# Patient Record
Sex: MALE | Race: BLACK OR AFRICAN AMERICAN | Employment: FULL TIME | URBAN - METROPOLITAN AREA
[De-identification: names, ages, dates, MRNs, and addresses within clinical notes are randomized per-mention and may not be internally consistent; named-entity substitution may affect disease eponyms.]

---

## 2018-02-23 ENCOUNTER — HOSPITAL ENCOUNTER (EMERGENCY)
Age: 28
Discharge: HOME | End: 2018-02-23
Attending: EMERGENCY MEDICINE
Payer: COMMERCIAL

## 2018-02-23 ENCOUNTER — APPOINTMENT (OUTPATIENT)
Dept: GENERAL RADIOLOGY | Age: 28
End: 2018-02-23
Attending: PHYSICIAN ASSISTANT
Payer: COMMERCIAL

## 2018-02-23 VITALS
OXYGEN SATURATION: 100 % | SYSTOLIC BLOOD PRESSURE: 117 MMHG | RESPIRATION RATE: 18 BRPM | DIASTOLIC BLOOD PRESSURE: 66 MMHG | HEART RATE: 65 BPM | TEMPERATURE: 97.1 F

## 2018-02-23 DIAGNOSIS — S82.54XA CLOSED NONDISPLACED FRACTURE OF MEDIAL MALLEOLUS OF RIGHT TIBIA, INITIAL ENCOUNTER: ICD-10-CM

## 2018-02-23 DIAGNOSIS — S82.454A CLOSED NONDISPLACED COMMINUTED FRACTURE OF SHAFT OF RIGHT FIBULA, INITIAL ENCOUNTER: Primary | ICD-10-CM

## 2018-02-23 PROCEDURE — 73590 X-RAY EXAM OF LOWER LEG: CPT

## 2018-02-23 PROCEDURE — 73610 X-RAY EXAM OF ANKLE: CPT

## 2018-02-23 PROCEDURE — 99283 EMERGENCY DEPT VISIT LOW MDM: CPT

## 2018-02-23 PROCEDURE — 74011250637 HC RX REV CODE- 250/637: Performed by: PHYSICIAN ASSISTANT

## 2018-02-23 PROCEDURE — 75810000053 HC SPLINT APPLICATION

## 2018-02-23 RX ORDER — HYDROCODONE BITARTRATE AND ACETAMINOPHEN 5; 325 MG/1; MG/1
1 TABLET ORAL
Qty: 20 TAB | Refills: 0 | Status: SHIPPED | OUTPATIENT
Start: 2018-02-23 | End: 2018-02-23

## 2018-02-23 RX ORDER — ACETAMINOPHEN AND CODEINE PHOSPHATE 300; 30 MG/1; MG/1
1 TABLET ORAL
Status: COMPLETED | OUTPATIENT
Start: 2018-02-23 | End: 2018-02-23

## 2018-02-23 RX ADMIN — ACETAMINOPHEN AND CODEINE PHOSPHATE 1 TABLET: 300; 30 TABLET ORAL at 15:01
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
## 2018-02-23 NOTE — ED NOTES
I have reviewed discharge instructions with the patient. The patient verbalized understanding. Splint placed to pts right leg by Andres Lilly who also provided pt with crutches with instructions.  Splint reviewed by MARBELLA Khanna prior to pt being discharged

## 2018-02-23 NOTE — DISCHARGE INSTRUCTIONS
Broken Lower Leg: Care Instructions  Your Care Instructions    Treatment for your broken leg will depend on how bad the break is. Your doctor may have put your lower leg in a splint or a cast to allow it to heal or keep it stable until you see another doctor. It may take weeks or months for your leg to heal. You can help it heal with some care at home. You heal best when you take good care of yourself. Eat a variety of healthy foods, and don't smoke. Follow-up care is a key part of your treatment and safety. Be sure to make and go to all appointments, and call your doctor if you are having problems. It's also a good idea to know your test results and keep a list of the medicines you take. How can you care for yourself at home? · Put ice or a cold pack on your lower leg for 10 to 20 minutes at a time. Try to do this every 1 to 2 hours for the next 3 days (when you are awake). Put a thin cloth between the ice and your cast or splint. Keep your cast or splint dry. · Follow the cast care instructions your doctor gives you. If you have a splint, do not take it off unless your doctor tells you to. · Be safe with medicines. Take pain medicines exactly as directed. ¨ If the doctor gave you a prescription medicine for pain, take it as prescribed. ¨ If you are not taking a prescription pain medicine, ask your doctor if you can take an over-the-counter medicine. · Do not put weight on your leg unless your doctor tells you to. Use crutches to walk. · Prop up your leg on pillows when you sit or lie down in the first few days after the injury. Keep your leg higher than the level of your heart. This will help reduce swelling. · Follow instructions for exercises to keep your leg strong. · Wiggle your toes often to reduce swelling and stiffness. When should you call for help? Call 911 anytime you think you may need emergency care.  For example, call if:  ? · You have chest pain, are short of breath, or you cough up blood.   ? · You are very sleepy and you have trouble waking up. ?Call your doctor now or seek immediate medical care if:  ? · You have new or worse nausea or vomiting. ? · You have new or worse pain. ? · Your foot is cool or pale or changes color. ? · You have tingling, weakness, or numbness in your toes. ? · Your cast or splint feels too tight. ? · You have signs of a blood clot in your leg (called a deep vein thrombosis), such as:  ¨ Pain in your calf, back of the knee, thigh, or groin. ¨ Redness or swelling in your leg. ? Watch closely for changes in your health, and be sure to contact your doctor if:  ? · You have a problem with your splint or cast.   ? · You do not get better as expected. Where can you learn more? Go to http://tony-iris.info/. Enter L198 in the search box to learn more about \"Broken Lower Leg: Care Instructions. \"  Current as of: March 21, 2017  Content Version: 11.4  © 6131-1929 [employer]. Care instructions adapted under license by [employer] (which disclaims liability or warranty for this information). If you have questions about a medical condition or this instruction, always ask your healthcare professional. [address] any warranty or liability for your use of this information. InSequent Activation    Thank you for requesting access to In[employer]. Please follow the instructions below to securely access and download your online medical record. InSequent allows you to send messages to your doctor, view your test results, renew your prescriptions, schedule appointments, and more. How Do I Sign Up? 1. In your internet browser, go to [email or web address]  2. Click on the First Time User? Click Here link in the Sign In box. You will be redirect to the New Member Sign Up page. 3. Enter your InSequent Access Code exactly as it appears below.  You will not need to use this code after youve completed the sign-up process. If you do not sign up before the expiration date, you must request a new code. Chatous Access Code: [email or web address]  Expires: 2018  3:13 PM (This is the date your Chatous access code will )    4. Enter the last four digits of your Social Security Number (xxxx) and Date of Birth (mm/dd/yyyy) as indicated and click Submit. You will be taken to the next sign-up page. 5. Create a Chatous ID. This will be your Chatous login ID and cannot be changed, so think of one that is secure and easy to remember. 6. Create a Chatous password. You can change your password at any time. 7. Enter your Password Reset Question and Answer. This can be used at a later time if you forget your password. 8. Enter your e-mail address. You will receive e-mail notification when new information is available in [address]. 9. Click Sign Up. You can now view and download portions of your medical record. 10. Click the Download Summary menu link to download a portable copy of your medical information. Additional Information    If you have questions, please visit the Frequently Asked Questions section of the Chatous website at [email or web address] Remember, Chatous is NOT to be used for urgent needs. For medical emergencies, dial 911.

## 2018-02-23 NOTE — LETTER
[address] [place] [address] [facility] EMERGENCY DEPT 
[address] [facility] [address] 
[phone] Work/School Note Date: 2/23/2018 To Whom It May concern: 
 
Willi Grimes was seen and treated today in the emergency room by the following provider(s): 
Attending Provider: Felicia Michaels MD 
Physician Assistant: Erin Doan. Willi Grimes may return to work on 2/28/18. Sincerely, 
 
 
 
 
Erin Doan

## 2018-02-24 ENCOUNTER — HOSPITAL ENCOUNTER (EMERGENCY)
Age: 28
Discharge: HOME | End: 2018-02-24
Attending: EMERGENCY MEDICINE
Payer: SELF-PAY

## 2018-02-24 VITALS
HEART RATE: 66 BPM | BODY MASS INDEX: 21.14 KG/M2 | SYSTOLIC BLOOD PRESSURE: 134 MMHG | DIASTOLIC BLOOD PRESSURE: 82 MMHG | TEMPERATURE: 98.3 F | OXYGEN SATURATION: 99 % | HEIGHT: 75 IN | RESPIRATION RATE: 16 BRPM | WEIGHT: 170 LBS

## 2018-02-24 DIAGNOSIS — M79.604 RIGHT LEG PAIN: ICD-10-CM

## 2018-02-24 DIAGNOSIS — S82.391D CLOSED FRACTURE OF POSTERIOR MALLEOLUS OF RIGHT TIBIA WITH ROUTINE HEALING, SUBSEQUENT ENCOUNTER: Primary | ICD-10-CM

## 2018-02-24 DIAGNOSIS — S82.454D CLOSED NONDISPLACED COMMINUTED FRACTURE OF SHAFT OF RIGHT FIBULA WITH ROUTINE HEALING, SUBSEQUENT ENCOUNTER: ICD-10-CM

## 2018-02-24 PROCEDURE — 74011250636 HC RX REV CODE- 250/636: Performed by: PHYSICIAN ASSISTANT

## 2018-02-24 PROCEDURE — 96372 THER/PROPH/DIAG INJ SC/IM: CPT

## 2018-02-24 PROCEDURE — 99283 EMERGENCY DEPT VISIT LOW MDM: CPT

## 2018-02-24 PROCEDURE — 74011250637 HC RX REV CODE- 250/637: Performed by: PHYSICIAN ASSISTANT

## 2018-02-24 RX ORDER — HYDROCODONE BITARTRATE AND ACETAMINOPHEN 5; 325 MG/1; MG/1
1 TABLET ORAL
COMMUNITY
End: 2018-02-28

## 2018-02-24 RX ORDER — ONDANSETRON 8 MG/1
8 TABLET, ORALLY DISINTEGRATING ORAL
Status: COMPLETED | OUTPATIENT
Start: 2018-02-24 | End: 2018-02-24

## 2018-02-24 RX ORDER — MORPHINE SULFATE 4 MG/ML
6 INJECTION INTRAVENOUS ONCE
Status: COMPLETED | OUTPATIENT
Start: 2018-02-24 | End: 2018-02-24

## 2018-02-24 RX ADMIN — ONDANSETRON 8 MG: 8 TABLET, ORALLY DISINTEGRATING ORAL at 06:44

## 2018-02-24 RX ADMIN — MORPHINE SULFATE 6 MG: 4 INJECTION INTRAVENOUS at 06:44
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
## 2018-02-24 NOTE — ED NOTES
Pt resting on stretcher with family at bedside. Pt reports 10/10 pain. Medication administered. Marleen bal to monitor pt and await discharge instructions.
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
## 2018-02-24 NOTE — ED NOTES
I have reviewed discharge instructions with patient. The patient verbalized understanding. Patient armband removed and shredded. No acute distress noted at this time, pt alert and oriented. Stable at time of discharge. Vital signs stable. [facility]. Pt instructed to fill prescription given to him at prior visit.

## 2018-02-27 ENCOUNTER — APPOINTMENT (OUTPATIENT)
Dept: GENERAL RADIOLOGY | Age: 28
End: 2018-02-27
Attending: ORTHOPAEDIC SURGERY
Payer: COMMERCIAL

## 2018-02-27 ENCOUNTER — ANESTHESIA (OUTPATIENT)
Dept: SURGERY | Age: 28
End: 2018-02-27
Payer: COMMERCIAL

## 2018-02-27 ENCOUNTER — HOSPITAL ENCOUNTER (OUTPATIENT)
Age: 28
Setting detail: OBSERVATION
Discharge: HOME | End: 2018-02-28
Attending: ORTHOPAEDIC SURGERY | Admitting: ORTHOPAEDIC SURGERY
Payer: COMMERCIAL

## 2018-02-27 ENCOUNTER — ANESTHESIA EVENT (OUTPATIENT)
Dept: SURGERY | Age: 28
End: 2018-02-27
Payer: COMMERCIAL

## 2018-02-27 ENCOUNTER — OFFICE VISIT (OUTPATIENT)
Dept: ORTHOPEDIC SURGERY | Age: 28
End: 2018-02-27

## 2018-02-27 VITALS
OXYGEN SATURATION: 98 % | TEMPERATURE: 98.4 F | HEIGHT: 75 IN | DIASTOLIC BLOOD PRESSURE: 83 MMHG | SYSTOLIC BLOOD PRESSURE: 134 MMHG | HEART RATE: 66 BPM

## 2018-02-27 DIAGNOSIS — S93.439A SYNDESMOTIC DISRUPTION OF ANKLE, INITIAL ENCOUNTER: ICD-10-CM

## 2018-02-27 DIAGNOSIS — S82.891A CLOSED FRACTURE OF RIGHT ANKLE, INITIAL ENCOUNTER: Primary | ICD-10-CM

## 2018-02-27 DIAGNOSIS — Z01.818 PRE-OPERATIVE EXAMINATION: ICD-10-CM

## 2018-02-27 DIAGNOSIS — S82.851A CLOSED TRIMALLEOLAR FRACTURE OF RIGHT ANKLE, INITIAL ENCOUNTER: ICD-10-CM

## 2018-02-27 PROBLEM — S82.861A: Status: ACTIVE | Noted: 2018-02-27

## 2018-02-27 PROBLEM — S82.899A ANKLE FRACTURE: Status: ACTIVE | Noted: 2018-02-27

## 2018-02-27 PROBLEM — S80.829A BLISTER OF LEG: Status: ACTIVE | Noted: 2018-02-27

## 2018-02-27 PROCEDURE — 73590 X-RAY EXAM OF LOWER LEG: CPT

## 2018-02-27 PROCEDURE — 74011250636 HC RX REV CODE- 250/636: Performed by: ANESTHESIOLOGY

## 2018-02-27 PROCEDURE — 77030011640 HC PAD GRND REM COVD -A: Performed by: ORTHOPAEDIC SURGERY

## 2018-02-27 PROCEDURE — 76210000016 HC OR PH I REC 1 TO 1.5 HR: Performed by: ORTHOPAEDIC SURGERY

## 2018-02-27 PROCEDURE — C1713 ANCHOR/SCREW BN/BN,TIS/BN: HCPCS | Performed by: ORTHOPAEDIC SURGERY

## 2018-02-27 PROCEDURE — 99218 HC RM OBSERVATION: CPT

## 2018-02-27 PROCEDURE — 76060000033 HC ANESTHESIA 1 TO 1.5 HR: Performed by: ORTHOPAEDIC SURGERY

## 2018-02-27 PROCEDURE — 74011250636 HC RX REV CODE- 250/636: Performed by: ORTHOPAEDIC SURGERY

## 2018-02-27 PROCEDURE — 77030018836 HC SOL IRR NACL ICUM -A: Performed by: ORTHOPAEDIC SURGERY

## 2018-02-27 PROCEDURE — 74011000250 HC RX REV CODE- 250

## 2018-02-27 PROCEDURE — 77030012510 HC MSK AIRWY LMA TELE -B: Performed by: ANESTHESIOLOGY

## 2018-02-27 PROCEDURE — 77030020782 HC GWN BAIR PAWS FLX 3M -B: Performed by: ORTHOPAEDIC SURGERY

## 2018-02-27 PROCEDURE — 77030016919 HC CLMP EXT FIX1 SYNT -F: Performed by: ORTHOPAEDIC SURGERY

## 2018-02-27 PROCEDURE — 74011250637 HC RX REV CODE- 250/637: Performed by: ORTHOPAEDIC SURGERY

## 2018-02-27 PROCEDURE — 74011000250 HC RX REV CODE- 250: Performed by: ANESTHESIOLOGY

## 2018-02-27 PROCEDURE — 74011250636 HC RX REV CODE- 250/636: Performed by: NURSE ANESTHETIST, CERTIFIED REGISTERED

## 2018-02-27 PROCEDURE — 76010000149 HC OR TIME 1 TO 1.5 HR: Performed by: ORTHOPAEDIC SURGERY

## 2018-02-27 PROCEDURE — 74011250636 HC RX REV CODE- 250/636

## 2018-02-27 DEVICE — SCREW EXT FIX L175MM DIA5MM THRD L60MM S STL SELF DRL SCHNZ: Type: IMPLANTABLE DEVICE | Site: TIBIA | Status: FUNCTIONAL

## 2018-02-27 DEVICE — IMPLANTABLE DEVICE: Type: IMPLANTABLE DEVICE | Site: TIBIA | Status: FUNCTIONAL

## 2018-02-27 DEVICE — CLAMP EXT FIX L PIN 6 POS MAG RESONANCE CONDITIONAL: Type: IMPLANTABLE DEVICE | Site: TIBIA | Status: FUNCTIONAL

## 2018-02-27 RX ORDER — HYDROCODONE BITARTRATE AND ACETAMINOPHEN 5; 325 MG/1; MG/1
1 TABLET ORAL ONCE
Status: DISCONTINUED | OUTPATIENT
Start: 2018-02-27 | End: 2018-02-27 | Stop reason: HOSPADM

## 2018-02-27 RX ORDER — DOCUSATE SODIUM 100 MG/1
100 CAPSULE, LIQUID FILLED ORAL DAILY
Status: DISCONTINUED | OUTPATIENT
Start: 2018-02-28 | End: 2018-02-28 | Stop reason: HOSPADM

## 2018-02-27 RX ORDER — PROMETHAZINE HYDROCHLORIDE 25 MG/1
25 TABLET ORAL
Qty: 30 TAB | Refills: 0 | Status: SHIPPED | OUTPATIENT
Start: 2018-02-27 | End: 2018-03-12 | Stop reason: SDUPTHER

## 2018-02-27 RX ORDER — HYDROMORPHONE HYDROCHLORIDE 1 MG/ML
0.5 INJECTION, SOLUTION INTRAMUSCULAR; INTRAVENOUS; SUBCUTANEOUS
Status: DISCONTINUED | OUTPATIENT
Start: 2018-02-27 | End: 2018-02-27 | Stop reason: HOSPADM

## 2018-02-27 RX ORDER — SODIUM CHLORIDE 0.9 % (FLUSH) 0.9 %
5-10 SYRINGE (ML) INJECTION EVERY 8 HOURS
Status: DISCONTINUED | OUTPATIENT
Start: 2018-02-27 | End: 2018-02-27 | Stop reason: HOSPADM

## 2018-02-27 RX ORDER — ONDANSETRON 2 MG/ML
INJECTION INTRAMUSCULAR; INTRAVENOUS AS NEEDED
Status: DISCONTINUED | OUTPATIENT
Start: 2018-02-27 | End: 2018-02-27 | Stop reason: HOSPADM

## 2018-02-27 RX ORDER — DIPHENHYDRAMINE HYDROCHLORIDE 50 MG/ML
25 INJECTION, SOLUTION INTRAMUSCULAR; INTRAVENOUS
Status: DISCONTINUED | OUTPATIENT
Start: 2018-02-27 | End: 2018-02-27 | Stop reason: HOSPADM

## 2018-02-27 RX ORDER — HYDROCODONE BITARTRATE AND ACETAMINOPHEN 10; 325 MG/1; MG/1
2 TABLET ORAL
Status: DISCONTINUED | OUTPATIENT
Start: 2018-02-27 | End: 2018-02-28 | Stop reason: HOSPADM

## 2018-02-27 RX ORDER — KETOROLAC TROMETHAMINE 30 MG/ML
INJECTION, SOLUTION INTRAMUSCULAR; INTRAVENOUS AS NEEDED
Status: DISCONTINUED | OUTPATIENT
Start: 2018-02-27 | End: 2018-02-27 | Stop reason: HOSPADM

## 2018-02-27 RX ORDER — FAMOTIDINE 20 MG/1
20 TABLET, FILM COATED ORAL 2 TIMES DAILY
Status: DISCONTINUED | OUTPATIENT
Start: 2018-02-27 | End: 2018-02-28 | Stop reason: HOSPADM

## 2018-02-27 RX ORDER — HYDROMORPHONE HYDROCHLORIDE 2 MG/ML
0.5 INJECTION, SOLUTION INTRAMUSCULAR; INTRAVENOUS; SUBCUTANEOUS
Status: DISCONTINUED | OUTPATIENT
Start: 2018-02-27 | End: 2018-02-27

## 2018-02-27 RX ORDER — HYDROCODONE BITARTRATE AND ACETAMINOPHEN 7.5; 325 MG/1; MG/1
TABLET ORAL
Qty: 50 TAB | Refills: 0 | Status: SHIPPED | OUTPATIENT
Start: 2018-02-27 | End: 2018-03-12 | Stop reason: SDUPTHER

## 2018-02-27 RX ORDER — LIDOCAINE HYDROCHLORIDE 20 MG/ML
INJECTION, SOLUTION EPIDURAL; INFILTRATION; INTRACAUDAL; PERINEURAL AS NEEDED
Status: DISCONTINUED | OUTPATIENT
Start: 2018-02-27 | End: 2018-02-27 | Stop reason: HOSPADM

## 2018-02-27 RX ORDER — FENTANYL CITRATE 50 UG/ML
INJECTION, SOLUTION INTRAMUSCULAR; INTRAVENOUS AS NEEDED
Status: DISCONTINUED | OUTPATIENT
Start: 2018-02-27 | End: 2018-02-27 | Stop reason: HOSPADM

## 2018-02-27 RX ORDER — FENTANYL CITRATE 50 UG/ML
50 INJECTION, SOLUTION INTRAMUSCULAR; INTRAVENOUS AS NEEDED
Status: DISCONTINUED | OUTPATIENT
Start: 2018-02-27 | End: 2018-02-27 | Stop reason: HOSPADM

## 2018-02-27 RX ORDER — DEXAMETHASONE SODIUM PHOSPHATE 4 MG/ML
INJECTION, SOLUTION INTRA-ARTICULAR; INTRALESIONAL; INTRAMUSCULAR; INTRAVENOUS; SOFT TISSUE AS NEEDED
Status: DISCONTINUED | OUTPATIENT
Start: 2018-02-27 | End: 2018-02-27 | Stop reason: HOSPADM

## 2018-02-27 RX ORDER — SODIUM CHLORIDE, SODIUM LACTATE, POTASSIUM CHLORIDE, CALCIUM CHLORIDE 600; 310; 30; 20 MG/100ML; MG/100ML; MG/100ML; MG/100ML
75 INJECTION, SOLUTION INTRAVENOUS CONTINUOUS
Status: DISCONTINUED | OUTPATIENT
Start: 2018-02-27 | End: 2018-02-27 | Stop reason: HOSPADM

## 2018-02-27 RX ORDER — CEFAZOLIN SODIUM 2 G/50ML
2 SOLUTION INTRAVENOUS ONCE
Status: COMPLETED | OUTPATIENT
Start: 2018-02-27 | End: 2018-02-27

## 2018-02-27 RX ORDER — PROPOFOL 10 MG/ML
INJECTION, EMULSION INTRAVENOUS AS NEEDED
Status: DISCONTINUED | OUTPATIENT
Start: 2018-02-27 | End: 2018-02-27 | Stop reason: HOSPADM

## 2018-02-27 RX ORDER — ASPIRIN 325 MG
325 TABLET ORAL DAILY
Qty: 30 TAB | Refills: 0 | Status: SHIPPED | OUTPATIENT
Start: 2018-02-27 | End: 2018-03-12 | Stop reason: SDUPTHER

## 2018-02-27 RX ORDER — NALOXONE HYDROCHLORIDE 0.4 MG/ML
0.4 INJECTION, SOLUTION INTRAMUSCULAR; INTRAVENOUS; SUBCUTANEOUS AS NEEDED
Status: DISCONTINUED | OUTPATIENT
Start: 2018-02-27 | End: 2018-02-28 | Stop reason: HOSPADM

## 2018-02-27 RX ORDER — SODIUM CHLORIDE 0.9 % (FLUSH) 0.9 %
5-10 SYRINGE (ML) INJECTION AS NEEDED
Status: DISCONTINUED | OUTPATIENT
Start: 2018-02-27 | End: 2018-02-28 | Stop reason: HOSPADM

## 2018-02-27 RX ORDER — SODIUM CHLORIDE 0.9 % (FLUSH) 0.9 %
5-10 SYRINGE (ML) INJECTION AS NEEDED
Status: DISCONTINUED | OUTPATIENT
Start: 2018-02-27 | End: 2018-02-27 | Stop reason: HOSPADM

## 2018-02-27 RX ORDER — POLYETHYLENE GLYCOL 3350 17 G/17G
17 POWDER, FOR SOLUTION ORAL DAILY
Qty: 10 PACKET | Refills: 1 | Status: SHIPPED | OUTPATIENT
Start: 2018-02-27 | End: 2018-03-12 | Stop reason: SDUPTHER

## 2018-02-27 RX ORDER — SODIUM CHLORIDE, SODIUM LACTATE, POTASSIUM CHLORIDE, CALCIUM CHLORIDE 600; 310; 30; 20 MG/100ML; MG/100ML; MG/100ML; MG/100ML
50 INJECTION, SOLUTION INTRAVENOUS CONTINUOUS
Status: DISCONTINUED | OUTPATIENT
Start: 2018-02-27 | End: 2018-02-27 | Stop reason: HOSPADM

## 2018-02-27 RX ORDER — MIDAZOLAM HYDROCHLORIDE 1 MG/ML
INJECTION, SOLUTION INTRAMUSCULAR; INTRAVENOUS AS NEEDED
Status: DISCONTINUED | OUTPATIENT
Start: 2018-02-27 | End: 2018-02-27 | Stop reason: HOSPADM

## 2018-02-27 RX ORDER — SODIUM CHLORIDE 0.9 % (FLUSH) 0.9 %
5-10 SYRINGE (ML) INJECTION EVERY 8 HOURS
Status: DISCONTINUED | OUTPATIENT
Start: 2018-02-27 | End: 2018-02-28 | Stop reason: HOSPADM

## 2018-02-27 RX ORDER — CEFAZOLIN SODIUM 2 G/50ML
2 SOLUTION INTRAVENOUS EVERY 8 HOURS
Status: DISCONTINUED | OUTPATIENT
Start: 2018-02-28 | End: 2018-02-28 | Stop reason: HOSPADM

## 2018-02-27 RX ADMIN — FAMOTIDINE 20 MG: 10 INJECTION, SOLUTION INTRAVENOUS at 17:59

## 2018-02-27 RX ADMIN — CEFAZOLIN SODIUM 2 G: 2 SOLUTION INTRAVENOUS at 18:19

## 2018-02-27 RX ADMIN — MIDAZOLAM HYDROCHLORIDE 2 MG: 1 INJECTION, SOLUTION INTRAMUSCULAR; INTRAVENOUS at 18:17

## 2018-02-27 RX ADMIN — FENTANYL CITRATE 50 MCG: 50 INJECTION INTRAMUSCULAR; INTRAVENOUS at 20:28

## 2018-02-27 RX ADMIN — DEXAMETHASONE SODIUM PHOSPHATE 4 MG: 4 INJECTION, SOLUTION INTRA-ARTICULAR; INTRALESIONAL; INTRAMUSCULAR; INTRAVENOUS; SOFT TISSUE at 18:33

## 2018-02-27 RX ADMIN — LIDOCAINE HYDROCHLORIDE 100 MG: 20 INJECTION, SOLUTION EPIDURAL; INFILTRATION; INTRACAUDAL; PERINEURAL at 18:24

## 2018-02-27 RX ADMIN — FAMOTIDINE 20 MG: 20 TABLET, FILM COATED ORAL at 21:21

## 2018-02-27 RX ADMIN — PROPOFOL 200 MG: 10 INJECTION, EMULSION INTRAVENOUS at 18:24

## 2018-02-27 RX ADMIN — ONDANSETRON 4 MG: 2 INJECTION INTRAMUSCULAR; INTRAVENOUS at 18:33

## 2018-02-27 RX ADMIN — KETOROLAC TROMETHAMINE 30 MG: 30 INJECTION, SOLUTION INTRAMUSCULAR; INTRAVENOUS at 19:28

## 2018-02-27 RX ADMIN — HYDROCODONE BITARTRATE AND ACETAMINOPHEN 2 TABLET: 10; 325 TABLET ORAL at 21:21

## 2018-02-27 RX ADMIN — FENTANYL CITRATE 100 MCG: 50 INJECTION, SOLUTION INTRAMUSCULAR; INTRAVENOUS at 18:49

## 2018-02-27 RX ADMIN — Medication 10 ML: at 21:21

## 2018-02-27 RX ADMIN — SODIUM CHLORIDE, SODIUM LACTATE, POTASSIUM CHLORIDE, AND CALCIUM CHLORIDE 50 ML/HR: 600; 310; 30; 20 INJECTION, SOLUTION INTRAVENOUS at 17:57

## 2018-02-27 NOTE — PATIENT INSTRUCTIONS
Dr. Shante Ramirez Pre-operative Instructions:      Patient: Heladio Rodriguez   :  1990     I understand I am to stop taking oral birth control pills, hormonal replacement therapy and all Aspirin, Aspirin containing medications, Non-Steroidal Anti-Inflammatory medications (such as Advil, Aleve, Motrin, Ibuprofen) and or Blood thinner medication such as Coumadin, Plavix, Heparin or others 5 days prior to surgery. I understand I am to STOP taking these Medications 5 days prior to surgery:  I am to get instructions from my prescribing physician. 1. __As listed above_______________________  2. _____________________________________  3. _____________________________________  4. _____________________________________    I understand that if I am taking daily medications for high blood pressure, I can take them the morning of surgery with a small sip of water. I will consult my prescribing physician or call JANE with specific questions. I also understand that:     I am to report important observations or changes that may occur prior to surgery. If I have any changes in my physical condition, such as a rash, a fever, sore throat, abscess, ulcers, nausea, vomiting, or diarrhea. I am to call the office and I am to consult my primary care physician to assess and treat the problem.  I am not to eat or drink anything after midnight the night before my surgery.  I am not to drink alcoholic beverages 24 hours prior to surgery.  I am not to do any illegal drugs prior to surgery.  I am not to smoke at least 24 hours prior to surgery.  I am able and will shower or bathe before surgery. I will use the Hibiclens solution on my surgical site only. The hibiclens directions are one packet a day starting two days before surgery.  I will remove any nail polish, make-up or jewelry prior to arriving for my surgery.       If I wear glasses, contact lenses or dentures they must be removed prior to going to the operating room.  All body piercing and artifical eye-lashes must be removed prior to surgery     I will not wear any aerosol sprays, perfumes or skin creams.  I am to make arrangements for a family member or friend to accompany me to surgery and take me home after my surgery as I will not be allowed to leave the hospital alone. A cab or bus will not be acceptable. Please make arrange for someone to stay with you for 24 hours after surgery.  Patient has expressed understanding of the diagnosis, treatment and planned surgery        Surgery: What to Expect at [address] [place] [address]  This care sheet gives you a general idea about how long it will take for you to recover from your surgery. But each person recovers at a different pace. How can you care for yourself at home? Activity  · Allow your body to heal. Don't move quickly or lift anything heavy until you are feeling better. · Rest when you feel tired. · Your doctor may give you specific instructions on when you can do your normal activities again, such as driving and going back to work. · Be active. Walking is a good choice. Diet  · You can eat your normal diet when you feel well. If your stomach is upset, try bland, low-fat foods like plain rice, broiled chicken, toast, and yogurt. · If your bowel movements are not regular right after surgery, try to avoid constipation and straining. Drink plenty of water. Your doctor may suggest fiber, a stool softener, or a mild laxative. Medicines  · Your doctor will tell you if and when you can restart your medicines. He or she will also give you instructions about taking any new medicines. · If you take blood thinners, such as warfarin (Coumadin), clopidogrel (Plavix), or aspirin, be sure to talk to your doctor. He or she will tell you if and when to start taking those medicines again. Make sure that you understand exactly what your doctor wants you to do. · Be safe with medicines. Read and follow all instructions on the label. ¨ If the doctor gave you a prescription medicine for pain, take it as prescribed. ¨ If you are not taking a prescription pain medicine, ask your doctor if you can take an over-the-counter medicine. Incision care  · You will have a dressing over the cut (incision). A dressing helps the incision heal and protects it. Your doctor will tell you how to take care of this. · If you have strips of tape on the cut the doctor made, leave the tape on for a week or until it falls off. · If you had stitches, your doctor will tell you when to come back to have them removed. · If you have skin adhesive on the cut, leave it on until it falls off. Skin adhesive is also called liquid stitches. · Change the bandage every day. · Wash the area daily with warm, soapy water, and pat it dry. Don't use hydrogen peroxide or alcohol. They can slow healing. · You may cover the area with a gauze bandage if it oozes fluid or rubs against clothing. · You may shower 24 to 48 hours after surgery. Pat the incision dry. Don't swim or take a bath for the first 2 weeks, or until your doctor tells you it is okay. Follow-up care is a key part of your treatment and safety. Be sure to make and go to all appointments, and call your doctor if you are having problems. It's also a good idea to know your test results and keep a list of the medicines you take. When should you call for help? Call 911 anytime you think you may need emergency care. For example, call if:  · You passed out (lost consciousness). · You have severe trouble breathing. · You have sudden chest pain and shortness of breath, or you cough up blood. Call your doctor now or seek immediate medical care if:  · You have pain that does not get better after you take pain medicine. · You have loose stitches, or your incision comes open. · You are bleeding through your dressing.  A small amount of blood is normal.  · You have signs of infection, such as:  ¨ Increased pain, swelling, warmth, or redness. ¨ Red streaks leading from the incision. ¨ Pus draining from the incision. ¨ A fever. · You have symptoms of a blood clot in your arm or leg (called a deep vein thrombosis). These may include:  ¨ Pain in your calf, back of the knee, thigh, or groin. ¨ Redness and swelling in the arm, leg, or groin. Watch closely for any changes in your health, and be sure to contact your doctor if:  · You do not have a bowel movement after taking a laxative. Where can you learn more? Go to http://tony-iris.info/  Enter U008 in the search box to learn more about \"Surgery: What to Expect at Home. \"  © 8454-8344 Healthwise, Incorporated. Care instructions adapted under license by [employer] (which disclaims liability or warranty for this information). This care instruction is for use with your licensed healthcare professional. If you have questions about a medical condition or this instruction, always ask your healthcare professional. [place] [address] any warranty or liability for your use of this information. Content Version: 27.7.806941; Current as of: November 20, 2015          Acute Pain After Surgery: Care Instructions  Your Care Instructions      It's common to have some pain after surgery. Pain doesn't mean that something is wrong or that the surgery didn't go well. But when the pain is severe, it's important to work with your doctor to manage it. It's also important to be aware of a few facts about pain and pain medicine. · You are the only person who knows what your pain feels like. So be sure to tell your doctor when you are in pain or when the pain changes. Then he or she will know how to adjust your medicines. · Pain is often easier to control right after it starts. So it may be better to take regular doses of pain medicine and not wait until the pain gets bad. · Medicine can help control pain. But this doesn't mean you'll have no pain. Medicine works to keep the pain at a level you can live with. With time, you will feel better. Follow-up care is a key part of your treatment and safety. Be sure to make and go to all appointments, and call your doctor if you are having problems. It's also a good idea to know your test results and keep a list of the medicines you take. How can you care for yourself at home? · Be safe with medicines. Read and follow all instructions on the label. ¨ If the doctor gave you a prescription medicine for pain, take it as prescribed. ¨ If you are not taking a prescription pain medicine, ask your doctor if you can take an over-the-counter medicine. · If you take an over-the-counter pain medicine, such as acetaminophen (Tylenol), ibuprofen (Advil, Motrin), or naproxen (Aleve), read and follow all instructions on the label. · Do not take two or more pain medicines at the same time unless the doctor told you to. · Do not drink alcohol while you are taking pain medicines. · Try to walk each day if your doctor recommends it. Start by walking a little more than you did the day before. Bit by bit, increase the amount you walk. Walking increases blood flow. It also helps prevent pneumonia and constipation. · To prevent constipation from opioid pain medicines:  ¨ Talk to your doctor about a laxative. ¨ Include fruits, vegetables, beans, and whole grains in your diet each day. These foods are high in fiber. ¨ Drink plenty of fluids, enough so that your urine is light yellow or clear like water. Drink water, fruit juice, or other drinks that do not contain caffeine or alcohol. If you have kidney, heart, or liver disease and have to limit fluids, talk with your doctor before you increase the amount of fluids you drink. ¨ Take a fiber supplement, such as Citrucel or Metamucil, every day if needed. Read and follow all instructions on the label.  If you take pain medicine for more than a few days, talk to your doctor before you take fiber. When should you call for help? Call your doctor now or seek immediate medical care if:  ? · Your pain gets worse. ? · Your pain is not controlled by medicine. ? Watch closely for changes in your health, and be sure to contact your doctor if you have any problems. Where can you learn more? Go to http://tony-iris.info/. Enter [phone] in the search box to learn more about \"Acute Pain After Surgery: Care Instructions. \"  Current as of: March 20, 2017  Content Version: 11.4  © 7402-1868 [employer]. Care instructions adapted under license by [employer] (which disclaims liability or warranty for this information). If you have questions about a medical condition or this instruction, always ask your healthcare professional. [address] any warranty or liability for your use of this information.

## 2018-02-27 NOTE — IP AVS SNAPSHOT
[address] [address] [facility] Patient: Lance Burgos MRN: [id number] :1990 About your hospitalization You were admitted on:  2018 You last received care in the:  [facility]  You were discharged on:  2018 Why you were hospitalized Your primary diagnosis was:  Not on File Your diagnoses also included:  Fracture Of Ankle, Trimalleolar, Closed, Right, Initial Encounter, Displaced [place]uve Fracture Of Right Lower Extremity, Blister Of Leg, Ankle Fracture Follow-up Information Follow up With Details Comments Contact Info None   None ([phone]) Patient stated that they have no PCP MARBELLA Sebastian On 3/6/2018 Appointment at 9:15 am  [place] [address] [address] [facility] [address] [facility] 
[phone] Your Scheduled Appointments 2018  9:15 AM EST  
POST OP with MARBELLA Sebastian  
[facility] ([facility] [address] [facility]  
[phone] Discharge Orders None A check yamilex indicates which time of day the medication should be taken. My Medications CHANGE how you take these medications Instructions Each Dose to Equal  
 Morning Noon Evening Bedtime HYDROcodone-acetaminophen 7.5-325 mg per tablet Commonly known as:  Douglas Grayson What changed:  Another medication with the same name was removed. Continue taking this medication, and follow the directions you see here. Your last dose was: Your next dose is: TAKE ONE TABLET BY MOUTH Q 4 - 6 HRS PRN PAIN **AFTER SURGERY**DO NOT TAKE BEFORE SURGERY  Indications: Pain CONTINUE taking these medications Instructions Each Dose to Equal  
 Morning Noon Evening Bedtime aspirin 325 mg tablet Commonly known as:  ASPIRIN Your last dose was: Your next dose is: Take 1 Tab by mouth daily. 325 mg  
    
   
   
   
  
 polyethylene glycol 17 gram packet Commonly known as:  Rosezena Margaret Your last dose was: Your next dose is: Take 1 Packet by mouth daily. 17 g  
    
   
   
   
  
 promethazine 25 mg tablet Commonly known as:  PHENERGAN Your last dose was: Your next dose is: Take 1 Tab by mouth every six (6) hours as needed for Nausea. 25 mg Discharge Instructions DISCHARGE SUMMARY from Nurse PATIENT INSTRUCTIONS: 
 
 
F-face looks uneven A-arms unable to move or move unevenly S-speech slurred or non-existent T-time-call 911 as soon as signs and symptoms begin-DO NOT go Back to bed or wait to see if you get better-TIME IS BRAIN. Warning Signs of HEART ATTACK Call 911 if you have these symptoms: 
? Chest discomfort. Most heart attacks involve discomfort in the center of the chest that lasts more than a few minutes, or that goes away and comes back. It can feel like uncomfortable pressure, squeezing, fullness, or pain. ? Discomfort in other areas of the upper body. Symptoms can include pain or discomfort in one or both arms, the back, neck, jaw, or stomach. ? Shortness of breath with or without chest discomfort. ? Other signs may include breaking out in a cold sweat, nausea, or lightheadedness. Don't wait more than five minutes to call [address]! Fast action can save your life.  Calling 911 is almost always the fastest way to get lifesaving treatment. Emergency Medical Services staff can begin treatment when they arrive  up to an hour sooner than if someone gets to the hospital by car. The discharge information has been reviewed with the patient. The patient verbalized understanding. Discharge medications reviewed with the patient and appropriate educational materials and side effects teaching were provided. ___________________________________________________________________________________________________________________________________ MyChart Activation Thank you for requesting access to [employer]. Please follow the instructions below to securely access and download your online medical record. [employer] allows you to send messages to your doctor, view your test results, renew your prescriptions, schedule appointments, and more. How Do I Sign Up? 1. In your internet browser, go to [email or web address] 
2. Click on the First Time User? Click Here link in the Sign In box. You will be redirect to the New Member Sign Up page. 3. Enter your [employer] Access Code exactly as it appears below. You will not need to use this code after youve completed the sign-up process. If you do not sign up before the expiration date, you must request a new code. [employer] Access Code: [email or web address] Expires: 2018  3:13 PM (This is the date your [employer] access code will ) 4. Enter the last four digits of your Social Security Number (xxxx) and Date of Birth (mm/dd/yyyy) as indicated and click Submit. You will be taken to the next sign-up page. 5. Create a [employer] ID. This will be your [employer] login ID and cannot be changed, so think of one that is secure and easy to remember. 6. Create a [employer] password. You can change your password at any time. 7. Enter your Password Reset Question and Answer. This can be used at a later time if you forget your password. 8. Enter your e-mail address. You will receive e-mail notification when new information is available in [address] 9. Click Sign Up. You can now view and download portions of your medical record. 10. Click the Download Summary menu link to download a portable copy of your medical information. Additional Information If you have questions, please visit the Frequently Asked Questions section of the [employer] website at [email or web address] Remember, [employer]BE is NOT to be used for urgent needs. For medical emergencies, dial 911. Patient armband removed and shredded HE[employer]BE Announcement We are excited to announce that we are making your provider's discharge notes available to you in [employer]BE. You will see these notes when they are completed and signed by the physician that discharged you from your recent hospital stay. If you have any questions or concerns about any information you see in [employer], please call the Health Information Department where you were seen or reach out to your Primary Care Provider for more information about your plan of care. Introducing [facility]! Jelani Sauer introduces [employer]BE patient portal. Now you can access parts of your medical record, email your doctor's office, and request medication refills online. 1. In your internet browser, go to [email or web address] 2. Click on the First Time User? Click Here link in the Sign In box. You will see the New Member Sign Up page. 3. Enter your HEALBE Access Code exactly as it appears below. You will not need to use this code after youve completed the sign-up process. If you do not sign up before the expiration date, you must request a new code. · [employer]ALBE Access Code: [email or web address] Expires: 5/24/2018  3:13 PM 
 
4. Enter the last four digits of your Social Security Number (xxxx) and Date of Birth (mm/dd/yyyy) as indicated and click Submit.  You will be taken to the next sign-up page. 5. Create a My Single Point ID. This will be your My [employer] login ID and cannot be changed, so think of one that is secure and easy to remember. 6. Create a [employer] password. You can change your password at any time. 7. Enter your Password Reset Question and Answer. This can be used at a later time if you forget your password. 8. Enter your e-mail address. You will receive e-mail notification when new information is available in [address] 9. Click Sign Up. You can now view and download portions of your medical record. 10. Click the Download Summary menu link to download a portable copy of your medical information. If you have questions, please visit the Frequently Asked Questions section of the [employer] website. Remember, My [employer] is NOT to be used for urgent needs. For medical emergencies, dial 911. Now available from your iPhone and Android! Unresulted Labs-Please follow up with your PCP about these lab tests Order Current Status [place] XR TECHNOLOGIST SERVICE In process Providers Seen During Your Hospitalization Provider Specialty Primary office phone Shawna Omega, [address] [facility] Orthopedic Surgery [phone] Your Primary Care Physician (PCP) Primary Care Physician Office Phone Office Fax NONE ** None ** ** None ** You are allergic to the following No active allergies Recent Documentation Height Weight BMI Smoking Status 1.905 m 79.4 kg 21.87 kg/m2 Never Smoker Emergency Contacts Name Discharge Info Relation Home Work Mobile [address] DISCHARGE CAREGIVER [3] Other Relative [6] [phone] Rolf Baldwin DISCHARGE CAREGIVER [3] Other Relative [6] [phone] Pattie Ferris  Other Relative [6] [phone] Magdalene Soriano  Other Relative [6] [phone] Patient Belongings The following personal items are in your possession at time of discharge: Dental Appliances: None         Home Medications: None   Jewelry: None  Clothing: Footwear, Shorts, Shirt, Socks, Undergarments    Other Valuables: Crutches, [employer], Dieudonne Loose Please provide this summary of care documentation to your next provider. Signatures-by signing, you are acknowledging that this After Visit Summary has been reviewed with you and you have received a copy. Patient Signature:  ____________________________________________________________ Date:  ____________________________________________________________  
  
[facility] Provider Signature:  ____________________________________________________________ Date:  ____________________________________________________________
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
## 2018-02-27 NOTE — ANESTHESIA PREPROCEDURE EVALUATION
Anesthetic History   No history of anesthetic complications            Review of Systems / Medical History  Patient summary reviewed and pertinent labs reviewed    Pulmonary  Within defined limits                 Neuro/Psych   Within defined limits           Cardiovascular    Hypertension: poorly controlled              Exercise tolerance: >4 METS     GI/Hepatic/Renal  Within defined limits              Endo/Other  Within defined limits           Other Findings   Comments: Documentation of current medication  Current medications obtained, documented and obtained? YES      Risk Factors for Postoperative nausea/vomiting:       History of postoperative nausea/vomiting? NO       Female? NO       Motion sickness? NO       Intended opioid administration for postoperative analgesia? YES      Smoking Abstinence:  Current Smoker? NO  Elective Surgery? YES  Seen preoperatively by anesthesiologist or proxy prior to day of surgery? YES  Pt abstained from smoking 24 hours prior to anesthesia?  YES    Preventive care/screening for High Blood Pressure:  Aged 18 years and older: YES  Screened for high blood pressure: YES  Patients with high blood pressure referred to primary care provider   for BP management: YES                 Physical Exam    Airway  Mallampati: I  TM Distance: > 6 cm  Neck ROM: normal range of motion   Mouth opening: Normal     Cardiovascular  Regular rate and rhythm,  S1 and S2 normal,  no murmur, click, rub, or gallop             Dental  No notable dental hx       Pulmonary  Breath sounds clear to auscultation               Abdominal  GI exam deferred       Other Findings            Anesthetic Plan    ASA: 2  Anesthesia type: general          Induction: Intravenous  Anesthetic plan and risks discussed with: Patient

## 2018-02-27 NOTE — MR AVS SNAPSHOT
[address] [place] [address], [address] [facility] 
[phone] Patient: Tina Berry MRN: [id number] :1990 Visit Information Date & Time Provider Department Dept. Phone Encounter #  
 2018  1:30 PM Teresa Gallardo, 27 [facility] [id number] 70 Upcoming Health Maintenance Date Due DTaP/Tdap/Td series (1 - Tdap) 10/10/2011 Influenza Age 5 to Adult 2017 Allergies as of 2018  Review Complete On: 2018 By: Teresa Gallardo MD  
 No Known Allergies Current Immunizations  Never Reviewed No immunizations on file. Not reviewed this visit You Were Diagnosed With   
  
 Codes Comments Closed fracture of right ankle, initial encounter    -  Primary ICD-10-CM: B03.594J ICD-9-CM: 824.8 Closed trimalleolar fracture of right ankle, initial encounter     ICD-10-CM: A94.596L ICD-9-CM: 824.6 Syndesmotic disruption of ankle, initial encounter     ICD-10-CM: J26.573X ICD-9-CM: 845.03 Vitals BP Pulse Temp Height(growth percentile) SpO2 Smoking Status 134/83 66 98.4 °F (36.9 °C) (Oral) 6' 3\" (1.905 m) 98% Never Smoker Your Updated Medication List  
  
   
This list is accurate as of 18  4:20 PM.  Always use your most recent med list.  
  
  
  
  
 Briana Punt 5-325 mg per tablet Generic drug:  HYDROcodone-acetaminophen Take 1 Tab by mouth every four (4) hours as needed for Pain. Indications: Pain We Performed the Following AMB POC XRAY, ANKLE; COMPLETE, 3+ VIE [27532 CPT(R)] AMB POC XRAY; TIBIA & FIBULA, TWO VIE [45975 CPT(R)] To-Do List   
 2018 Lab:  CBC WITH AUTOMATED DIFF   
  
 2018 Lab:  DRUG SCREEN UR - W/ CONFIRM   
  
 2018 ECG:  EKG, 12 LEAD, SUBSEQUENT   
  
 2018 Lab:  HCG URINE, QL   
  
 2018 Lab: METABOLIC PANEL, COMPREHENSIVE Around 2018 Lab:  PROTHROMBIN TIME + INR Around 2018 Imaging:  XR CHEST PA LAT Patient Instructions Dr. Migdalia Tapia Pre-operative Instructions: 
 
 
Patient: Lance Burgos :  1990 I understand I am to stop taking oral birth control pills, hormonal replacement therapy and all Aspirin, Aspirin containing medications, Non-Steroidal Anti-Inflammatory medications (such as Advil, Aleve, Motrin, Ibuprofen) and or Blood thinner medication such as Coumadin, Plavix, Heparin or others 5 days prior to surgery. I understand I am to STOP taking these Medications 5 days prior to surgery: 
I am to get instructions from my prescribing physician. 1. __As listed above_______________________ 2. _____________________________________ 3. _____________________________________ 4. _____________________________________ I understand that if I am taking daily medications for high blood pressure, I can take them the morning of surgery with a small sip of water. I will consult my prescribing physician or call JANE with specific questions. I also understand that: ? I am to report important observations or changes that may occur prior to surgery. If I have any changes in my physical condition, such as a rash, a fever, sore throat, abscess, ulcers, nausea, vomiting, or diarrhea. I am to call the office and I am to consult my primary care physician to assess and treat the problem. ? I am not to eat or drink anything after midnight the night before my surgery. ? I am not to drink alcoholic beverages 24 hours prior to surgery. ? I am not to do any illegal drugs prior to surgery. ? I am not to smoke at least 24 hours prior to surgery. ? I am able and will shower or bathe before surgery. I will use the Hibiclens solution on my surgical site only. The hibiclens directions are one packet a day starting two days before surgery. ? I will remove any nail polish, make-up or jewelry prior to arriving for my surgery. ? If I wear glasses, contact lenses or dentures they must be removed prior to going to the operating room. ? All body piercing and artifical eye-lashes must be removed prior to surgery ? I will not wear any aerosol sprays, perfumes or skin creams. ? I am to make arrangements for a family member or friend to accompany me to surgery and take me home after my surgery as I will not be allowed to leave the hospital alone. A cab or bus will not be acceptable. Please make arrange for someone to stay with you for 24 hours after surgery. ? Patient has expressed understanding of the diagnosis, treatment and planned surgery Surgery: What to Expect at [facility] Your Recovery This care sheet gives you a general idea about how long it will take for you to recover from your surgery. But each person recovers at a different pace. How can you care for yourself at home? Activity · Allow your body to heal. Don't move quickly or lift anything heavy until you are feeling better. · Rest when you feel tired. · Your doctor may give you specific instructions on when you can do your normal activities again, such as driving and going back to work. · Be active. Walking is a good choice. Diet · You can eat your normal diet when you feel well. If your stomach is upset, try bland, low-fat foods like plain rice, broiled chicken, toast, and yogurt. · If your bowel movements are not regular right after surgery, try to avoid constipation and straining. Drink plenty of water. Your doctor may suggest fiber, a stool softener, or a mild laxative. Medicines · Your doctor will tell you if and when you can restart your medicines. He or she will also give you instructions about taking any new medicines. · If you take blood thinners, such as warfarin (Coumadin), clopidogrel (Plavix), or aspirin, be sure to talk to your doctor. He or she will tell you if and when to start taking those medicines again. Make sure that you understand exactly what your doctor wants you to do. · Be safe with medicines. Read and follow all instructions on the label. ¨ If the doctor gave you a prescription medicine for pain, take it as prescribed. ¨ If you are not taking a prescription pain medicine, ask your doctor if you can take an over-the-counter medicine. Incision care · You will have a dressing over the cut (incision). A dressing helps the incision heal and protects it. Your doctor will tell you how to take care of this. · If you have strips of tape on the cut the doctor made, leave the tape on for a week or until it falls off. · If you had stitches, your doctor will tell you when to come back to have them removed. · If you have skin adhesive on the cut, leave it on until it falls off. Skin adhesive is also called liquid stitches. · Change the bandage every day. · Wash the area daily with warm, soapy water, and pat it dry. Don't use hydrogen peroxide or alcohol. They can slow healing. · You may cover the area with a gauze bandage if it oozes fluid or rubs against clothing. · You may shower 24 to 48 hours after surgery. Pat the incision dry. Don't swim or take a bath for the first 2 weeks, or until your doctor tells you it is okay. Follow-up care is a key part of your treatment and safety. Be sure to make and go to all appointments, and call your doctor if you are having problems. It's also a good idea to know your test results and keep a list of the medicines you take. When should you call for help? Call 911 anytime you think you may need emergency care. For example, call if: 
· You passed out (lost consciousness). · You have severe trouble breathing. · You have sudden chest pain and shortness of breath, or you cough up blood. Call your doctor now or seek immediate medical care if: 
· You have pain that does not get better after you take pain medicine. · You have loose stitches, or your incision comes open. · You are bleeding through your dressing. A small amount of blood is normal. 
· You have signs of infection, such as: 
¨ Increased pain, swelling, warmth, or redness. ¨ Red streaks leading from the incision. ¨ Pus draining from the incision. ¨ A fever. · You have symptoms of a blood clot in your arm or leg (called a deep vein thrombosis). These may include: 
¨ Pain in your calf, back of the knee, thigh, or groin. ¨ Redness and swelling in the arm, leg, or groin. Watch closely for any changes in your health, and be sure to contact your doctor if: 
· You do not have a bowel movement after taking a laxative. Where can you learn more? Go to http://tony-iris.info/ Enter E862 in the search box to learn more about \"Surgery: What to Expect at Home. \" 
© 8734-1047 Healthwise, [employer]. Care instructions adapted under license by [employer] (which disclaims liability or warranty for this information). This care instruction is for use with your licensed healthcare professional. If you have questions about a medical condition or this instruction, always ask your healthcare professional. [address] any warranty or liability for your use of this information. Content Version: 67.2.244280; Current as of: November 20, 2015 Acute Pain After Surgery: Care Instructions Your Care Instructions It's common to have some pain after surgery. Pain doesn't mean that something is wrong or that the surgery didn't go well. But when the pain is severe, it's important to work with your doctor to manage it. It's also important to be aware of a few facts about pain and pain medicine. · You are the only person who knows what your pain feels like. So be sure to tell your doctor when you are in pain or when the pain changes. Then he or she will know how to adjust your medicines. · Pain is often easier to control right after it starts. So it may be better to take regular doses of pain medicine and not wait until the pain gets bad. · Medicine can help control pain. But this doesn't mean you'll have no pain. Medicine works to keep the pain at a level you can live with. With time, you will feel better. Follow-up care is a key part of your treatment and safety. Be sure to make and go to all appointments, and call your doctor if you are having problems. It's also a good idea to know your test results and keep a list of the medicines you take. How can you care for yourself at home? · Be safe with medicines. Read and follow all instructions on the label. ¨ If the doctor gave you a prescription medicine for pain, take it as prescribed. ¨ If you are not taking a prescription pain medicine, ask your doctor if you can take an over-the-counter medicine. · If you take an over-the-counter pain medicine, such as acetaminophen (Tylenol), ibuprofen (Advil, Motrin), or naproxen (Aleve), read and follow all instructions on the label. · Do not take two or more pain medicines at the same time unless the doctor told you to. · Do not drink alcohol while you are taking pain medicines. · Try to walk each day if your doctor recommends it. Start by walking a little more than you did the day before. Bit by bit, increase the amount you walk. Walking increases blood flow. It also helps prevent pneumonia and constipation. · To prevent constipation from opioid pain medicines: ¨ Talk to your doctor about a laxative. ¨ Include fruits, vegetables, beans, and whole grains in your diet each day. These foods are high in fiber.  
¨ Drink plenty of fluids, enough so that your urine is light yellow or clear like water. Drink water, fruit juice, or other drinks that do not contain caffeine or alcohol. If you have kidney, heart, or liver disease and have to limit fluids, talk with your doctor before you increase the amount of fluids you drink. ¨ Take a fiber supplement, such as Citrucel or Metamucil, every day if needed. Read and follow all instructions on the label. If you take pain medicine for more than a few days, talk to your doctor before you take fiber. When should you call for help? Call your doctor now or seek immediate medical care if: 
? · Your pain gets worse. ? · Your pain is not controlled by medicine. ? Watch closely for changes in your health, and be sure to contact your doctor if you have any problems. Where can you learn more? Go to http://tony-iris.info/. Enter ([phone] 445-536 in the search box to learn more about \"Acute Pain After Surgery: Care Instructions. \" Current as of: March 20, 2017 Content Version: 11.4 © 2954-7578 [employer]. Care instructions adapted under license by [employer] (which disclaims liability or warranty for this information). If you have questions about a medical condition or this instruction, always ask your healthcare professional. [place] [address] any warranty or liability for your use of this information. Introducing [facility]! [facility] introduces [employer] patient portal. Now you can access parts of your medical record, email your doctor's office, and request medication refills online. 1. In your internet browser, go to [email or web address] 2. Click on the First Time User? Click Here link in the Sign In box. You will see the New Member Sign Up page. 3. Enter your [employer] Access Code exactly as it appears below. You will not need to use this code after youve completed the sign-up process.  If you do not sign up before the expiration date, you must request a new code. · [employer] Access Code: [email or web address] Expires: 5/24/2018  3:13 PM 
 
4. Enter the last four digits of your Social Security Number (xxxx) and Date of Birth (mm/dd/yyyy) as indicated and click Submit. You will be taken to the next sign-up page. 5. Create a [employer] ID. This will be your [employer] login ID and cannot be changed, so think of one that is secure and easy to remember. 6. Create a [employer] password. You can change your password at any time. 7. Enter your Password Reset Question and Answer. This can be used at a later time if you forget your password. 8. Enter your e-mail address. You will receive e-mail notification when new information is available in [address]. 9. Click Sign Up. You can now view and download portions of your medical record. 10. Click the Download Summary menu link to download a portable copy of your medical information. If you have questions, please visit the Frequently Asked Questions section of the [employer] website. Remember, [employer] is NOT to be used for urgent needs. For medical emergencies, dial 911. Now available from your iPhone and Android! Please provide this summary of care documentation to your next provider. Your primary care clinician is listed as NONE. If you have any questions after today's visit, please call [phone].

## 2018-02-27 NOTE — LETTER
NOTIFICATION RETURN TO WORK / SCHOOL 
 
2/27/2018 4:18 PM 
 
Mr. Brandy Branch [address] [place] [address] To Whom It May Concern: 
 
rBandy Branch is currently under the care of [address] [place], [place] [address]lydia Sinclaird. Please allow Mr. Marga Sandoval to remain out of work for 4 weeks. He will be reevaluated in two weeks. If there are questions or concerns please have the patient contact our office.  
 
 
 
Sincerely, 
 
 
Tiffany Patel MD
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
## 2018-02-28 ENCOUNTER — APPOINTMENT (OUTPATIENT)
Dept: CT IMAGING | Age: 28
End: 2018-02-28
Attending: ORTHOPAEDIC SURGERY
Payer: COMMERCIAL

## 2018-02-28 ENCOUNTER — HOME HEALTH ADMISSION (OUTPATIENT)
Dept: HOME HEALTH SERVICES | Facility: HOME HEALTH | Age: 28
End: 2018-02-28

## 2018-02-28 VITALS
OXYGEN SATURATION: 100 % | DIASTOLIC BLOOD PRESSURE: 80 MMHG | TEMPERATURE: 98.8 F | SYSTOLIC BLOOD PRESSURE: 138 MMHG | HEIGHT: 75 IN | BODY MASS INDEX: 21.76 KG/M2 | HEART RATE: 54 BPM | RESPIRATION RATE: 18 BRPM | WEIGHT: 175 LBS

## 2018-02-28 PROCEDURE — 73700 CT LOWER EXTREMITY W/O DYE: CPT

## 2018-02-28 PROCEDURE — 74011250636 HC RX REV CODE- 250/636: Performed by: ORTHOPAEDIC SURGERY

## 2018-02-28 PROCEDURE — 74011250637 HC RX REV CODE- 250/637: Performed by: ORTHOPAEDIC SURGERY

## 2018-02-28 PROCEDURE — 97161 PT EVAL LOW COMPLEX 20 MIN: CPT

## 2018-02-28 PROCEDURE — 99218 HC RM OBSERVATION: CPT

## 2018-02-28 PROCEDURE — 77030027138 HC INCENT SPIROMETER -A

## 2018-02-28 RX ADMIN — Medication 10 ML: at 14:00

## 2018-02-28 RX ADMIN — CEFAZOLIN SODIUM 2 G: 2 SOLUTION INTRAVENOUS at 10:09

## 2018-02-28 RX ADMIN — HYDROCODONE BITARTRATE AND ACETAMINOPHEN 2 TABLET: 10; 325 TABLET ORAL at 15:07

## 2018-02-28 RX ADMIN — CEFAZOLIN SODIUM 2 G: 2 SOLUTION INTRAVENOUS at 02:07

## 2018-02-28 RX ADMIN — DOCUSATE SODIUM 100 MG: 100 CAPSULE, LIQUID FILLED ORAL at 08:41

## 2018-02-28 RX ADMIN — Medication 10 ML: at 04:30

## 2018-02-28 RX ADMIN — FAMOTIDINE 20 MG: 20 TABLET, FILM COATED ORAL at 08:41

## 2018-02-28 RX ADMIN — HYDROCODONE BITARTRATE AND ACETAMINOPHEN 2 TABLET: 10; 325 TABLET ORAL at 02:09

## 2018-02-28 NOTE — PROGRESS NOTES
Care Management Interventions  PCP Verified by CM: No  Palliative Care Criteria Met (RRAT>21 & CHF Dx)?: No  Mode of Transport at Discharge: Other (see comment) (Mother POV)  Transition of Care Consult (CM Consult): [address]: Yes  Physical Therapy Consult: Yes  Current Support Network: Lives Alone, Own Home (Parents, friends, grandmother, and Finance)  Confirm Follow Up Transport: Family  Plan discussed with Pt/Family/Caregiver: Yes  Freedom of Choice Offered: Yes  Discharge Location  Discharge Placement: Home with home health     33 yo male with external fixation to right distal tib/fib and ankle. He will need HHC at home and understands that it is his choice, he did choose [facility]. He will be staying at his grandmothers, [facility] has that information. He asked about family leave paperwork and I told him his doctors office needed to fill it out and offered to fax it to them. He said it wasn't here and he would have his grandmother take care it.

## 2018-02-28 NOTE — DISCHARGE INSTRUCTIONS
DISCHARGE SUMMARY from Nurse    PATIENT INSTRUCTIONS:    After general anesthesia or intravenous sedation, for 24 hours or while taking prescription Narcotics:  · Limit your activities  · Do not drive and operate hazardous machinery  · Do not make important personal or business decisions  · Do  not drink alcoholic beverages  · If you have not urinated within 8 hours after discharge, please contact your surgeon on call. Report the following to your surgeon:  · Excessive pain, swelling, redness or odor of or around the surgical area  · Temperature over 100.5  · Nausea and vomiting lasting longer than 4 hours or if unable to take medications  · Any signs of decreased circulation or nerve impairment to extremity: change in color, persistent  numbness, tingling, coldness or increase pain  · Any questions    What to do at Home:  Recommended activity: Activity as tolerated. If you experience any of the following symptoms pain not relieved by pain medication, swelling, redness, drainage please follow up with Dr. Coni Watson. *  Please give a list of your current medications to your Primary Care Provider. *  Please update this list whenever your medications are discontinued, doses are      changed, or new medications (including over-the-counter products) are added. *  Please carry medication information at all times in case of emergency situations. These are general instructions for a healthy lifestyle:    No smoking/ No tobacco products/ Avoid exposure to second hand smoke  Surgeon General's Warning:  Quitting smoking now greatly reduces serious risk to your health.     Obesity, smoking, and sedentary lifestyle greatly increases your risk for illness    A healthy diet, regular physical exercise & weight monitoring are important for maintaining a healthy lifestyle    You may be retaining fluid if you have a history of heart failure or if you experience any of the following symptoms:  Weight gain of 3 pounds or more overnight or 5 pounds in a week, increased swelling in our hands or feet or shortness of breath while lying flat in bed. Please call your doctor as soon as you notice any of these symptoms; do not wait until your next office visit. Recognize signs and symptoms of STROKE:    F-face looks uneven    A-arms unable to move or move unevenly    S-speech slurred or non-existent    T-time-call 911 as soon as signs and symptoms begin-DO NOT go       Back to bed or wait to see if you get better-TIME IS BRAIN. Warning Signs of HEART ATTACK     Call 911 if you have these symptoms:   Chest discomfort. Most heart attacks involve discomfort in the center of the chest that lasts more than a few minutes, or that goes away and comes back. It can feel like uncomfortable pressure, squeezing, fullness, or pain.  Discomfort in other areas of the upper body. Symptoms can include pain or discomfort in one or both arms, the back, neck, jaw, or stomach.  Shortness of breath with or without chest discomfort.  Other signs may include breaking out in a cold sweat, nausea, or lightheadedness. Don't wait more than five minutes to call 911 - MINUTES MATTER! Fast action can save your life. Calling 911 is almost always the fastest way to get lifesaving treatment. Emergency Medical Services staff can begin treatment when they arrive -- up to an hour sooner than if someone gets to the hospital by car. The discharge information has been reviewed with the patient. The patient verbalized understanding. Discharge medications reviewed with the patient and appropriate educational materials and side effects teaching were provided. ___________________________________________________________________________________________________________________________________  MyChart Activation    Thank you for requesting access to [employer]. Please follow the instructions below to securely access and download your online medical record.  [employer] allows you to send messages to your doctor, view your test results, renew your prescriptions, schedule appointments, and more. How Do I Sign Up? 1. In your internet browser, go to [email or web address]  2. Click on the First Time User? Click Here link in the Sign In box. You will be redirect to the New Member Sign Up page. 3. Enter your "[employer]" Access Code exactly as it appears below. You will not need to use this code after youve completed the sign-up process. If you do not sign up before the expiration date, you must request a new code. "[employer] Access Code: [email or web address]  Expires: 2018  3:13 PM (This is the date your "[employer] access code will )    4. Enter the last four digits of your Social Security Number (xxxx) and Date of Birth (mm/dd/yyyy) as indicated and click Submit. You will be taken to the next sign-up page. 5. Create a "[employer]" ID. This will be your "[employer]" login ID and cannot be changed, so think of one that is secure and easy to remember. 6. Create a "[employer]" password. You can change your password at any time. 7. Enter your Password Reset Question and Answer. This can be used at a later time if you forget your password. 8. Enter your e-mail address. You will receive e-mail notification when new information is available in [address] 9. Click Sign Up. You can now view and download portions of your medical record. 10. Click the Download Summary menu link to download a portable copy of your medical information. Additional Information    If you have questions, please visit the Frequently Asked Questions section of the "[employer]" website at [email or web address] Remember, "[employer]" is NOT to be used for urgent needs. For medical emergencies, dial 911.     Patient armband removed and shredded

## 2018-02-28 NOTE — PROGRESS NOTES
made IV; Patient was engaged in conversation with family member; Geneva Escobar introduced himself and spoke to both patient and family member briefly and departed room.

## 2018-02-28 NOTE — PROGRESS NOTES
PT order received and chart was reviewed. Pt is currently off the floor. Will re-attempt evaluation later in the day.   Hazel Reynoso, PT

## 2018-02-28 NOTE — PROGRESS NOTES
TRANSFER - IN REPORT:    Verbal report received from Friday TATIANNA goodrich(name) on [facility]  being received from [employer]) for routine post - op      Report consisted of patients Situation, Background, Assessment and   Recommendations(SBAR). Information from the following report(s) SBAR, Kardex, OR Summary, Intake/Output and MAR was reviewed with the receiving nurse. Opportunity for questions and clarification was provided. Assessment completed upon patients arrival to unit and care assumed.

## 2018-02-28 NOTE — HOME CARE
Received HH referral, Discharge noted for today, [facility] will follow for SN for External Fixation Care /Pin Site Care , pt states he has crutches, pt states he will be staying with his aunt Lindsey Ralph) when he discharges ,address obtained ( [phone]) , spoke to pt's grandmother Marcie Richardson) who states that she is willing to be taught wound care, [facility] will follow. JOANA CEDENO.

## 2018-02-28 NOTE — ROUTINE PROCESS
TRANSFER - OUT REPORT:    Verbal report given to Dimitrios Leal on Guillaume Chema  being transferred to room 506 for routine progression of care       Report consisted of patients Situation, Background, Assessment and   Recommendations(SBAR). Information from the following report(s) SBAR, OR Summary, Intake/Output and MAR was reviewed with the receiving nurse. Opportunity for questions and clarification was provided.       Patient transported with:   O2 @ 3 liters  Registered Nurse  [facility]

## 2018-02-28 NOTE — PROGRESS NOTES
2130  Received pt in stable condition,   General: lying in bed in supine, not apparent distress  Neuro: AOx4, denies numbness, 0 tingling, able to wiggle toes to bilateral extremities  Cardio: pedal pulses palpable  Respiratory: denies shortness of breath  Skin: external fixator to R leg clean, dry and intact  GI: has not voided yet  : denies nausea and vomiting  Mus: NWB to RLE  Bed in low position, oriented to room and use of call bell.

## 2018-02-28 NOTE — BRIEF OP NOTE
BRIEF OPERATIVE NOTE    Date of Procedure: 2/27/2018   Preoperative Diagnosis: RIGHT POSTERIOR TIBIA FX, RIGHT COMMINUTED FIBULA FX, SYNDESMOTIC DISRUPTION        Postoperative Diagnosis: RIGHT POSTERIOR TIBIA FX, RIGHT COMMINUTED FIBULA FX, SYNDESMOTIC DISRUPTION  Procedure(s):  RIGHT TIBIA EXTERNAL FIXATOR/C-ARM WITH DRAPE  CLOSED REDUCTION RIGHT ANKLE FX  DRAINAGE/BETY LARGE MEDIAL FX BLISTER  Surgeon(s) and Role:     * Carmina Mcdonald MD - Primary         Assistant Staff: None      Surgical Staff:  Circ-1: Jf Vo RN  Circ-Relief: Maryan Oconnell RN  Radiology Technician: Alma Alston  Scrub Tech-1: Ector Das   Scrub Tech-Relief: Arti Caulk  Surg Asst-1: Safia Fisher SA   Surg Asst-Relief: Jeanine Moniquew  No case tracking events are documented in the log. Anesthesia: General   Estimated Blood Loss: 15 ml EBL  IV FLUIDS:  700 ml IVF  Tourniquet Time:  None used  Specimens: * No specimens in log *   Findings: RIGHT POSTERIOR TIBIA FX, RIGHT COMMINUTED FIBULA FX, SYNDESMOTIC DISRUPTION   Complications: none  Implants:   Implant Name Type Inv.  Item Serial No.  Lot No. LRB No. Used Action   SCR SLF DRL 5X60 THRD 175MM --  - [id number]  SCR SLF DRL 5X60 THRD 175MM --   SYNTHES [place] N/A Right 2 Implanted   PIN STNMN CNTL THRD [id number] --  - [id number]  PIN STNMN CNTL THRD [id number] --   SYNTHES [place] N/A Right 1 Implanted   CLAMP PIN LG 6 POS MR SAFE --  - [id number]  CLAMP PIN LG 6 POS MR SAFE --   SYNTHES [place] N/A Right 1 Implanted   straight outrigger post 11mm MR safe     N/A Right 2 Implanted   SCR SLF DRL 5X60 THRD 175MM --  - [id number]  SCR SLF DRL 5X60 THRD 175MM --   SYNTHES [place] NA Right 2 Implanted   PIN STNMN CNTL THRD [id number] --  - [id number]   PIN STNMN CNTL THRD [id number] --    SYNTHES [place] NA Right 1 Implanted

## 2018-02-28 NOTE — OP NOTES
Patient: Daniel Mi                MRN:@           N: [id number]   YOB: 1990         AGE: 32 y.o. SEX: male       Date of Procedure: 2/27/2018   Preoperative Diagnosis: RIGHT POSTERIOR TIBIA FX, RIGHT COMMINUTED FIBULA FX, SYNDESMOTIC DISRUPTION        Postoperative Diagnosis: RIGHT POSTERIOR TIBIA FX, RIGHT COMMINUTED FIBULA FX, SYNDESMOTIC DISRUPTION  Procedure(s):  RIGHT TIBIA EXTERNAL FIXATOR/C-ARM WITH DRAPE  CLOSED REDUCTION RIGHT ANKLE FX  DRAINAGE/BETY LARGE MEDIAL FX BLISTER  Surgeon(s) and Role:     * Shonna Reyes MD - Primary         Assistant Staff: None      Surgical Staff:  Circ-1: Bentley Walker RN  Circ-Relief: Ponce Bates RN  Radiology Technician: Kermit Bartlett  Scrub Tech-1: Kathryn Boudreaux   Scrub Tech-Relief: Rheta Sauger  Surg Asst-1: Tanika Naveen SA   Surg Asst-Relief: Mayte Vigil  No case tracking events are documented in the log. Anesthesia: General   Estimated Blood Loss: 15 ml EBL  IV FLUIDS:  700 ml IVF  Tourniquet Time:  None used  Specimens: * No specimens in log *   Findings: RIGHT POSTERIOR TIBIA FX, RIGHT COMMINUTED FIBULA FX, SYNDESMOTIC DISRUPTION   Complications: none  Implants:   Implant Name Type Inv.  Item Serial No.  Lot No. LRB No. Used Action   SCR SLF DRL 5X60 THRD 175MM --  - [id number]  SCR SLF DRL 5X60 THRD 175MM --   SYNTHES [place] N/A Right 2 Implanted   PIN STNMN CNTL THRD [id number] --  - [id number]  PIN STNMN CNTL THRD [id number] --   SYNTHES [place] N/A Right 1 Implanted   CLAMP PIN LG 6 POS MR SAFE --  - [id number]  CLAMP PIN LG 6 POS MR SAFE --   SYNTHES [place] N/A Right 1 Implanted   straight outrigger post 11mm MR safe     N/A Right 2 Implanted   SCR SLF DRL 5X60 THRD 175MM --  - [id number]  SCR SLF DRL 5X60 THRD 175MM --   SYNTHES [place] NA Right 2 Implanted   PIN STNMN CNTL THRD [id number] --  - [id number]   PIN STNMN CNTL THRD [id number] --    SYNTHES [place] NA Right 1 Implanted DESCRIPTION OF PROCEDURE:       After written consents were obtained, Muriel Samaniego  was taken to the operating room today 2/27/2018 . General anesthesia was conducted  After Muriel Samaniego  was carefully positioned on the operating room table in supine position. The entire right lower extremity from the tip of the toes going all the way up to the knee was fully first cleansed with chlorhexidine gluconate scrub, cleansing between her toes, cleansing her hindfoot, and the whole foot all the way up to beyond her knee and up the distal femur. Then, at this time,Li Hahn was formally prepped with chlorhexidine gluconate scrub,chlorhexidine gluconate yellow prep stick x 4. A formal time-out was conducted identifying correct patient, correct limb, correct location for surgery, confirmation that the patient did receive antibiotics, confirmation that my marked signature was visible. As such, everyone agreed to our standard formal time-out. All members of the surgical team agreed to this formal time-out. As such, I elected to proceed. I first made a small incision, about an 8 mm incision, on the medial portion of the calcaneus at the tuberosity of the calcaneus  posterior portion. I made an incision with a 15 blade, dissecting carefully down to bone. I then placed my centrally threaded Synthes  5.0 mm centrally threaded pin from the medial towards the lateral direction. As it approached the lateral direction I made a small incision laterally  and then I passed my pin through the skin. I then, under fluoroscopy,located 2 areas along the proximal tibia well away from my planned  surgical site so that these proposed half pins were well away from my proposed future surgical site. I identified under fluoroscopy the exact  location of these pins.  I made a less than 1 cm incision through skin using the appropriate Synthes guide and then carefully dissected  through skin and subcutaneous directly down to bone and then placed two 5.0 mm Synthes Schanz pin half pins into the tibia position #6 and  position #1. I then constructed a delta frame and tightened everything up proximally, reduced the fracture as best I could, it was near anatomic  alignment in the AP and lateral views. Then I tightened up everything distally. I then carefully lanced the large medial fracture blister (proximal region, so that any drainage was well away from my external fixator pins. Next, sterile dressings were placed after cleansing the 4 skin pin sites. These were cleansed with sterile saline. I placed a Biopatch onto the Schanz pin skin interface, followed by 4x4s, 4-inch Kerlix, and 2 Ace wraps. Buddy Lefort was then extubated and transferred to the recovery room in stable condition. No complications.     Molly Morales MD  2/27/2018  7:37 PM

## 2018-02-28 NOTE — PROGRESS NOTES
Problem: Falls - Risk of  Goal: *Absence of Falls  Document Josephine Fall Risk and appropriate interventions in the flowsheet.    Outcome: Progressing Towards Goal  Fall Risk Interventions:  Mobility Interventions: Patient to call before getting OOB         Medication Interventions: Patient to call before getting OOB, Teach patient to arise slowly    Elimination Interventions: Call light in reach

## 2018-02-28 NOTE — ANESTHESIA POSTPROCEDURE EVALUATION
Post-Anesthesia Evaluation and Assessment    Patient: Michelle Marie MRN: [id number]  SSN: [id number]    YOB: 1990  Age: 32 y.o. Sex: male       Cardiovascular Function/Vital Signs  Visit Vitals    /61    Pulse 60    Temp 36.7 °C (98 °F)    Resp 14    Ht 6' 3\" (1.905 m)    Wt 79.4 kg (175 lb)    SpO2 100%    BMI 21.87 kg/m2       Patient is status post general anesthesia for Procedure(s):  RIGHT TIBIA EXTERNAL FIXATOR/C-ARM WITH DRAPE. Nausea/Vomiting: None    Postoperative hydration reviewed and adequate. Pain:  Pain Scale 1: Visual (02/27/18 1942)  Pain Intensity 1: 0 (02/27/18 1942)   Managed    Neurological Status:   Neuro (WDL): Within Defined Limits (02/27/18 1942)   At baseline    Mental Status and Level of Consciousness: Alert and oriented     Pulmonary Status:   O2 Device: Nasal cannula;Oral airway (02/27/18 1945)   Adequate oxygenation and airway patent    Complications related to anesthesia: None    Post-anesthesia assessment completed.  No concerns    Signed By: Maximilian Ellis CRNA     February 27, 2018

## 2018-02-28 NOTE — DISCHARGE SUMMARY
DISCHARGE SUMMARY         Patient: Agnieszka Pinon               Sex: male          MRN: [id number]         YOB: 1990      Age:  32 y.o.          LOS: 0 days                    ADMIT DATE:     2/27/2018    DISCHARGE DATE:     2/28/2018     ADMISSION DIAGNOSIS:     distal tibia fracture  Ankle fracture    DISCHARGE ORTHOPAEDIC DIAGNOSIS:     distal tibia fracture  Ankle fracture    DISCHARGE CONDITION:     GOOD    Afebrile  Ambulating  Eating, Drinking, Voiding  Stable    DISCHARGE DESTINATION:     Home with home health      HPI:     Patient is a 32 y.o. male that sustained a RIGHT POSTERIOR TIBIA FX, RIGHT COMMINUTED FIBULA FX, SYNDESMOTIC DISRUPTION. Due to the current findings and affected activity of daily living surgical intervention is indicated. The alternatives, risks, complications as well as expected outcome were discussed, the patient understands and wishes to proceed with surgery. HOSPITAL COURSE:     Patient had:     RIGHT TIBIA EXTERNAL FIXATOR/C-ARM WITH DRAPE  CLOSED REDUCTION RIGHT ANKLE FX  DRAINAGE/BETY LARGE MEDIAL FX BLISTER    POST-OP COURSE:     The patient tolerated the procedure well and was followed by internal medicine for help with medical management. Patient was place on antibiotic both pre and post-op for prophylaxis against infection. Vitals signs closely monitored and stable at discharge. The patient remained afebrile. Patient is in no acute distress and denies any N/V/D/C, no CP/SOB, no UR/GI/ symptoms. The wound was CDI. Patient had negative calf tenderness or swelling, no evidence for DVT. Patient had PT/OT consult for evaluation and treatment. There were no transfusions with Blood products during this hospital stay.       Physical Exam on Discharge:     Vital signs at discharge:   Visit Vitals    /71 (BP 1 Location: Right arm, BP Patient Position: At rest)    Pulse (!) 50    Temp 98 °F (36.7 °C)    Resp 16    Ht 6' 3\" (1.905 m)    Wt 175 lb (79.4 kg)    SpO2 100%    BMI 21.87 kg/m2         Intake/Output Summary (Last 24 hours) at 02/28/18 1115  Last data filed at 02/28/18 0214   Gross per 24 hour   Intake              700 ml   Output              600 ml   Net              100 ml         speech normal in context and clarity  memory intact grossly  Alert, Oriented x 3 sitting up in bed, cast, cap refill < 2 seconds. CHEST/ABDOMEN: Observation reveals: No audible wheezing from mouth. No accessory use of chest muscles during breathing. Non tender abdomen    Examination of right lower extremity reveals external fixator in place    Incision/Dressings:   Dressing Clean, dry and  Intact. Extremities:        No significant edema or embolic phenomena to the foot/toes or hands         Sensory grossly intact              Motor intact         NV intact         Lower extremities are warm and appear well perfused          Neg calf tenderness nor evidence DVT        CONSULTS:     None    SIGNIFICANT DIAGNOSTIC STUDIES:       Labs:    Basic Metabolic Profile   No results for input(s): NA, POTASSIUM, CHLORIDE, CO2, BUN in the last 72 hours. No lab exists for component: CREAT, GLUCOSE, CALCIUM, MAGNESIUM, PHOSPHORUS     Hepatic Function   No results for input(s): ALBUMIN in the last 72 hours. No lab exists for component: TOTPR, BILID, BILIT, SGPTALT, SGOTAST, ALKPHOS, AMYLASE, LIPASE      CBC w/Diff    No results for input(s): WBC, RBC, HCT, MCV, MCH, MCHC, RDW, HCTEXT in the last 72 hours. No lab exists for component: HEMOGLOBIN, PLATELET, MPV No results for input(s): BANDS, LYMPHOCYTES, MONOS, EOS, BASOS, PROMYELOCYTE, BLAST, RDW in the last 72 hours. No lab exists for component: RELNEU, RELLYM, RELMONO, RELEOS, RELBAS, SEGS, MYELOCYTE, META      Coagulation   No results for input(s): INR, APTT in the last 72 hours.     No lab exists for component: PT, INREXT          CHRONIC MEDICAL DIAGNOSES:     Problem List as of 2/28/2018  Date Reviewed: 2/27/2018          Codes Class Noted - Resolved    Fracture of ankle, trimalleolar, closed, right, initial encounter ICD-10-CM: I00.273Z  ICD-9-CM: 824.6  2/27/2018 - Present        Displaced Maisonneuve fracture of right lower extremity ICD-10-CM: S82.861A  ICD-9-CM: 823.01  2/27/2018 - Present        Blister of leg ICD-10-CM: O33.622U  ICD-9-CM: 916.2  2/27/2018 - Present        Ankle fracture ICD-10-CM: S82.899A  ICD-9-CM: 824.8  2/27/2018 - Present                DISCHARGE MEDICATIONS:     Current Discharge Medication List      CONTINUE these medications which have NOT CHANGED    Details   promethazine (PHENERGAN) 25 mg tablet Take 1 Tab by mouth every six (6) hours as needed for Nausea. Qty: 30 Tab, Refills: 0      polyethylene glycol (MIRALAX) 17 gram packet Take 1 Packet by mouth daily. Qty: 10 Packet, Refills: 1      aspirin (ASPIRIN) 325 mg tablet Take 1 Tab by mouth daily. Qty: 30 Tab, Refills: 0      HYDROcodone-acetaminophen (NORCO) 7.5-325 mg per tablet TAKE ONE TABLET BY MOUTH Q 4 - 6 HRS PRN PAIN **AFTER SURGERY**DO NOT TAKE BEFORE SURGERY  Indications: Pain  Qty: 50 Tab, Refills: 0    Associated Diagnoses: Closed trimalleolar fracture of right ankle, initial encounter; Syndesmotic disruption of ankle, initial encounter; Closed fracture of right ankle, initial encounter      HYDROcodone-acetaminophen (NORCO) 5-325 mg per tablet Take 1 Tab by mouth every four (4) hours as needed for Pain. Indications: Pain             · It is important that you take the medication exactly as they are prescribed. · Keep your medication in the bottles provided by the pharmacist and keep a list of the medication names, dosages, and times to be taken in your wallet. · Do not take other medications without consulting your doctor.    · Avoid constipation associated with Pain medication use by taking OTC Colace, Metamucil, Miralax or Milk of Magnesia    DISCHARGE PLAN:       The patient will be discharged to Home with home health. NWB right lower extremity. DIET:  Resume previous diet     NUTRITION: OTC Nutritional supplements, multivitamins, calcium with Vitamin  D    ACTIVITY: No lifting, Driving, or Strenuous exercise and No heavy lifting, pushing, pulling. Weight Bearing/Range of Motion Restrictions: NWB right lower extremity    INCISION CARE: . PIN sites to be cleaned and dressings to be changed by home nurse or nursing home staff as OUTLINED BELOW. Keep all pets away from  any wound present in order to prevent infection. PIN CARE SITE CARE  - FREQUENCY: 1 PER DAY    1. Please wash your hands  2. Place sterile gloves on  3. Clean pin sites ( all skin pin sites)  4. Remove dressings  5. Remove Biopatch pads, clean pin sites with sterile saline toperoxide 50:50 mixture, use sterile Q tips to clean the sites  6. USE BIOPATCH ANTIBIOTIC PIN PATCHES (or sterile Telfa cut to fit) PLACED AT ALL PIN SKIN SITES, THEN  PLACE STERILE CUT 4 OR 2 X 2 GAUZE OVER ALL PIN SKIN INTERFACE SITES. 7./ ELEVATE RIGHT LEG ON PILLOWS  Do not lift leg by pulling on external fixator. PAIN CONTROL: Prescriptions written: Norco 7.5.325     PRECAUTIONS:  Weight Bearing/Range of Motion Restrictions: NWB. No lifting, twisting, squatting, deep bending. Elevate the right lower extremity on 1 pillow. Place the pillow horizontal so that no pressure is on the back of your heel. VTE PROPHYLAXIS: with Aspirin 325 mg tablet, take one tablet by mouth daily. ANTIBIOTICS: None at this time      ADDITIONAL INFORMATION:       Please call [phone] ([address]) if any: fever > 101.5 , shakes, chills, nausea, vomiting, falling, intractable pain, or for any questions you have regarding your care/medical condition. If you experience any calf pain or swelling, or are having any shortness of breath, chest pain, or extremity swelling: Please go to closest ER ASAP for assessment to rule out a leg clot.     Please contact your Primary Care Physician for all preoperative medication directions, including the above medications. FOLLOW UP CARE:     You are to call your primary care provider and set up an appointment to see them in 2 weeks. Follow-up in the office with Dr. Yana Roth. Carlie Pereira or Leodan Diaz PA-C in 7 days. Please call [phone] to confirm your appointment. Call with any questions or concerns. [facility] [phone] [facility]  [phone]

## 2018-02-28 NOTE — PROGRESS NOTES
Problem: Mobility Impaired (Adult and Pediatric)  Goal: *Acute Goals and Plan of Care (Insert Text)  Outcome: Resolved/Met Date Met: 02/28/18  physical Therapy EVALUATION & Discharge    Patient: Arturo Hall (42 y.o. male)  Date: 2/28/2018  Primary Diagnosis: distal tibia fracture  Ankle fracture  Procedure(s) (LRB):  RIGHT TIBIA EXTERNAL FIXATOR/C-ARM WITH DRAPE (Right) 1 Day Post-Op   Precautions: NWB Right LE       ASSESSMENT AND RECOMMENDATIONS:  Based on the objective data described below, the patient presents at mod I level with ambulation and transfers using crutches and is able to maintain NWB status well on right LE. Skilled physical therapy is not indicated at this time. Discharge Recommendations: None  Further Equipment Recommendations for Discharge: N/A- pt has crutches      G-CODES:     Mobility  Current  CI= 1-19%   Goal  CI= 1-19%  D/C  CI= 1-19%. The severity rating is based on the Level of Assistance required for Functional Mobility and ADLs. Eval Complexity: History: MEDIUM  Complexity : 1-2 comorbidities / personal factors will impact the outcome/ POC Exam:LOW Complexity : 1-2 Standardized tests and measures addressing body structure, function, activity limitation and / or participation in recreation  Presentation: LOW Complexity : Stable, uncomplicated  Clinical Decision Making:Low Complexity , Overall Complexity:LOW     SUBJECTIVE:   Patient stated I'm doing alright.     OBJECTIVE DATA SUMMARY:     Past Medical History:   Diagnosis Date    Hypertension     Seasonal allergic rhinitis    History reviewed. No pertinent surgical history.   Barriers to Learning/Limitations: None  Compensate with: visual, verbal, tactile, kinesthetic cues/model  Prior Level of Function/Home Situation: independent prior to accident and has been using crutches since then, has ramp to enter home  Home Situation  Home Environment: Private residence  [address] [place] [address] St: One story  Living Alone: Yes  Support Systems: Family member(s), Friends \ neighbors  Patient Expects to be Discharged to[de-identified] Private residence  Current DME Used/Available at Home: None  Critical Behavior:   calm and cooperative   Strength:    Strength: Within functional limits (R ankle not assessed)   Tone & Sensation:   Tone: Normal       Range Of Motion:  AROM: Within functional limits (right ankle not tested)      Functional Mobility:  Bed Mobility:  Supine to Sit: Independent  Sit to Supine: Independent     Transfers:  Sit to Stand: Modified independent  Stand to Sit: Modified independent     Balance:   Sitting: Intact  Standing: Intact; With support  Ambulation/Gait Training:  Distance (ft): 100 Feet (ft)  Assistive Device: Crutches  Ambulation - Level of Assistance: Modified independent  Right Side Weight Bearing: Non-weight bearing    Pain:  Pt reports 1/10 pain or discomfort prior to treatment.    Pt reports 4/10 pain or discomfort post treatment. Activity Tolerance:   good  Please refer to the flowsheet for vital signs taken during this treatment. After treatment:   []         Patient left in no apparent distress sitting up in chair  [x]         Patient left in no apparent distress in bed  [x]         Call bell left within reach  [x]         Nursing notified  []         Caregiver present  []         Bed alarm activated    COMMUNICATION/EDUCATION:   [x]         Fall prevention education was provided and the patient/caregiver indicated understanding. [x]         Patient/family have participated as able in goal setting and plan of care.-eval only  []         Patient/family agree to work toward stated goals and plan of care. []         Patient understands intent and goals of therapy, but is neutral about his/her participation. []         Patient is unable to participate in goal setting and plan of care.   Educated patient on transfer techniques and maintaining NWB status    Thank you for this referral.  Moreno Hallman, PT Time Calculation: 18 mins

## 2018-02-28 NOTE — PROGRESS NOTES
Problem: Falls - Risk of  Goal: *Absence of Falls  Document Josephine Fall Risk and appropriate interventions in the flowsheet.    Outcome: Progressing Towards Goal  Fall Risk Interventions:  Mobility Interventions: Patient to call before getting OOB    Mentation Interventions: Adequate sleep, hydration, pain control, Door open when patient unattended    Medication Interventions: Patient to call before getting OOB, Teach patient to arise slowly    Elimination Interventions: Call light in reach, Patient to call for help with toileting needs    History of Falls Interventions: Door open when patient unattended

## 2018-03-01 ENCOUNTER — TELEPHONE (OUTPATIENT)
Dept: ORTHOPEDIC SURGERY | Age: 28
End: 2018-03-01

## 2018-03-01 NOTE — TELEPHONE ENCOUNTER
If Biopatch not available, then use sterile Telfa cut to fit     PIN CARE SITE CARE  - FREQUENCY: 1 PER DAY     1. Please wash your hands  2. Place sterile gloves on  3. Clean pin sites ( all skin pin sites)  4. Remove dressings  5. Remove Biopatch pads, clean pin sites with sterile saline toperoxide 50:50 mixture, use sterile Q tips to clean the sites  6. USE BIOPATCH ANTIBIOTIC PIN PATCHES (or sterile Telfa cut to fit) PLACED AT ALL PIN SKIN SITES, THEN  PLACE STERILE CUT 4 OR 2 X 2 GAUZE OVER ALL PIN SKIN INTERFACE SITES.   7./ ELEVATE RIGHT LEG ON PILLOWS  Do not lift leg by pulling on external fixator.

## 2018-03-01 NOTE — ROUTINE PROCESS
Patient received discharge paperwork. Patient had opportunity to ask any questions. Patients IVs and armbands removed. Patient waiting on ride.

## 2018-03-01 NOTE — TELEPHONE ENCOUNTER
bsi nurse brandan states pt is self pay and the bio patches are too expensive. brandan is requesting for something more cost effective for the patient.      Enter new order in [facility] or can fax to [phone]    Please call her to discuss/confirm at [phone]

## 2018-03-02 ENCOUNTER — HOSPITAL ENCOUNTER (EMERGENCY)
Age: 28
Discharge: HOME | End: 2018-03-02
Attending: EMERGENCY MEDICINE
Payer: COMMERCIAL

## 2018-03-02 ENCOUNTER — HOME CARE VISIT (OUTPATIENT)
Dept: HOME HEALTH SERVICES | Facility: HOME HEALTH | Age: 28
End: 2018-03-02

## 2018-03-02 VITALS
HEART RATE: 87 BPM | RESPIRATION RATE: 20 BRPM | SYSTOLIC BLOOD PRESSURE: 171 MMHG | OXYGEN SATURATION: 98 % | DIASTOLIC BLOOD PRESSURE: 102 MMHG | TEMPERATURE: 99.1 F

## 2018-03-02 DIAGNOSIS — Z48.00 DRESSING CHANGE: Primary | ICD-10-CM

## 2018-03-02 DIAGNOSIS — S82.891S CLOSED FRACTURE OF MALLEOLUS OF RIGHT ANKLE, SEQUELA: ICD-10-CM

## 2018-03-02 PROCEDURE — 99281 EMR DPT VST MAYX REQ PHY/QHP: CPT

## 2018-03-02 PROCEDURE — 96372 THER/PROPH/DIAG INJ SC/IM: CPT

## 2018-03-02 PROCEDURE — 74011250636 HC RX REV CODE- 250/636: Performed by: PHYSICIAN ASSISTANT

## 2018-03-02 RX ORDER — KETOROLAC TROMETHAMINE 30 MG/ML
60 INJECTION, SOLUTION INTRAMUSCULAR; INTRAVENOUS ONCE
Status: COMPLETED | OUTPATIENT
Start: 2018-03-02 | End: 2018-03-02

## 2018-03-02 RX ADMIN — KETOROLAC TROMETHAMINE 60 MG: 30 INJECTION, SOLUTION INTRAMUSCULAR at 15:31

## 2018-03-02 NOTE — ED PROVIDER NOTES
EMERGENCY DEPARTMENT HISTORY AND PHYSICAL EXAM    1:42 PM      Date: 3/2/2018  Patient Name: Michelle Bassett    History of Presenting Illness     Chief Complaint   Patient presents with    Dressing Change         History Provided By: Patient    Chief Complaint: is here to get dressing changed on R leg post surgery on 27 th Feb, 2018. Apparently \" the home health nurse never showed up\"   Duration:  since 27 th feb 2018  Timing:  N/A  Location: R leg  Quality: Dull  Severity: 3 out of 10  Modifying Factors: none  Associated Symptoms: denies any other associated signs or symptoms      Additional History (Context): Michelle Bassett is a 32 y.o. male with tibia fibular fracture and underwent surgery on 27 th, Feb who presents with c/o as noted above  . PCP: None    Current Outpatient Prescriptions   Medication Sig Dispense Refill    promethazine (PHENERGAN) 25 mg tablet Take 1 Tab by mouth every six (6) hours as needed for Nausea. 30 Tab 0    polyethylene glycol (MIRALAX) 17 gram packet Take 1 Packet by mouth daily. 10 Packet 1    aspirin (ASPIRIN) 325 mg tablet Take 1 Tab by mouth daily. 30 Tab 0    HYDROcodone-acetaminophen (NORCO) 7.5-325 mg per tablet TAKE ONE TABLET BY MOUTH Q 4 - 6 HRS PRN PAIN **AFTER SURGERY**DO NOT TAKE BEFORE SURGERY  Indications: Pain 50 Tab 0       Past History     Past Medical History:  Past Medical History:   Diagnosis Date    Hypertension     Seasonal allergic rhinitis        Past Surgical History:  History reviewed. No pertinent surgical history. Family History:  Family History   Problem Relation Age of Onset    Hypertension Other     Hypertension Maternal Grandmother        Social History:  Social History   Substance Use Topics    Smoking status: Never Smoker    Smokeless tobacco: Never Used    Alcohol use No       Allergies:  No Known Allergies      Review of Systems       Review of Systems   Constitutional: Negative for fever.    Respiratory: Negative for shortness of breath. Cardiovascular: Negative for chest pain, palpitations and leg swelling. All other systems reviewed and are negative. Physical Exam     Visit Vitals    BP (!) 171/102 (BP 1 Location: Left arm, BP Patient Position: Sitting)    Pulse 87    Temp 99.1 °F (37.3 °C)    Resp 20    SpO2 98%         Physical Exam   Constitutional: He is oriented to person, place, and time. He appears well-developed and well-nourished. No distress. HENT:   Head: Normocephalic and atraumatic. Eyes: Conjunctivae and EOM are normal. Pupils are equal, round, and reactive to light. Neck: Normal range of motion. Cardiovascular: Normal rate, regular rhythm and normal heart sounds. Pulmonary/Chest: Effort normal and breath sounds normal. No respiratory distress. He has no wheezes. He has no rales. He exhibits no tenderness. Abdominal: Soft. Bowel sounds are normal. He exhibits no distension. There is no tenderness. There is no rebound and no guarding. Musculoskeletal:        Legs:  Neurological: He is alert and oriented to person, place, and time. Skin: Skin is warm. He is not diaphoretic. Psychiatric: He has a normal mood and affect. His behavior is normal. Judgment and thought content normal.         Diagnostic Study Results     Labs -  No results found for this or any previous visit (from the past 12 hour(s)). Radiologic Studies -   No orders to display         Medical Decision Making   I am the first provider for this patient. I reviewed the vital signs, available nursing notes, past medical history, past surgical history, family history and social history. Vital Signs-Reviewed the patient's vital signs. Provider Notes (Medical Decision Making):    1:43 PM requested to page Dr Donavon Cruz group. Orthopedic  2:23 PM request  to page again.    2:43 PM call back from Dr Adi Carvalho who recommend to change dressing with biopatch and not to change for 48 hours after than and recommend to follow up at there clinic. Diagnosis     Clinical Impression:   1. Dressing change    2. Closed fracture of malleolus of right ankle, sequela        Disposition: HOME    Follow-up Information     Follow up With Details Comments [facility]  To establish care with primary care provider [address] [place] [address]    [facility] EMERGENCY DEPT  As needed [address] [place]  [phone]    Ani Mott MD In 3 days  [address]  [phone]             Patient's Medications   Start Taking    No medications on file   Continue Taking    ASPIRIN (ASPIRIN) 325 MG TABLET    Take 1 Tab by mouth daily. HYDROCODONE-ACETAMINOPHEN (NORCO) 7.5-325 MG PER TABLET    TAKE ONE TABLET BY MOUTH Q 4 - 6 HRS PRN PAIN **AFTER SURGERY**DO NOT TAKE BEFORE SURGERY  Indications: Pain    POLYETHYLENE GLYCOL (MIRALAX) 17 GRAM PACKET    Take 1 Packet by mouth daily. PROMETHAZINE (PHENERGAN) 25 MG TABLET    Take 1 Tab by mouth every six (6) hours as needed for Nausea.    These Medications have changed    No medications on file   Stop Taking    No medications on file

## 2018-03-02 NOTE — ED TRIAGE NOTES
Pt states he has not had his bandage changed since leaving the hospital X 3 days ago.  States has home health but no one has been there

## 2018-03-02 NOTE — HOME CARE
Pt is ACTIVE with [facility], notified Sukhdev Marcano at [facility] central intake that pt discharged home from ER  and [facility], [facility] will follow. JOANA CEDENO.

## 2018-03-03 ENCOUNTER — HOME CARE VISIT (OUTPATIENT)
Dept: SCHEDULING | Facility: HOME HEALTH | Age: 28
End: 2018-03-03

## 2018-03-03 PROCEDURE — G0299 HHS/HOSPICE OF RN EA 15 MIN: HCPCS

## 2018-03-03 PROCEDURE — 400013 HH SOC

## 2018-03-04 ENCOUNTER — HOME CARE VISIT (OUTPATIENT)
Dept: SCHEDULING | Facility: HOME HEALTH | Age: 28
End: 2018-03-04

## 2018-03-04 VITALS
BODY MASS INDEX: 21.76 KG/M2 | TEMPERATURE: 98 F | SYSTOLIC BLOOD PRESSURE: 116 MMHG | OXYGEN SATURATION: 98 % | HEIGHT: 75 IN | WEIGHT: 175 LBS | RESPIRATION RATE: 16 BRPM | HEART RATE: 78 BPM | DIASTOLIC BLOOD PRESSURE: 76 MMHG

## 2018-03-04 PROCEDURE — G0299 HHS/HOSPICE OF RN EA 15 MIN: HCPCS

## 2018-03-05 ENCOUNTER — HOME CARE VISIT (OUTPATIENT)
Dept: HOME HEALTH SERVICES | Facility: HOME HEALTH | Age: 28
End: 2018-03-05

## 2018-03-05 ENCOUNTER — HOME CARE VISIT (OUTPATIENT)
Dept: SCHEDULING | Facility: HOME HEALTH | Age: 28
End: 2018-03-05

## 2018-03-05 VITALS
DIASTOLIC BLOOD PRESSURE: 70 MMHG | OXYGEN SATURATION: 98 % | HEART RATE: 74 BPM | SYSTOLIC BLOOD PRESSURE: 120 MMHG | RESPIRATION RATE: 18 BRPM | TEMPERATURE: 98.3 F

## 2018-03-05 VITALS
RESPIRATION RATE: 20 BRPM | SYSTOLIC BLOOD PRESSURE: 148 MMHG | DIASTOLIC BLOOD PRESSURE: 76 MMHG | HEART RATE: 75 BPM | TEMPERATURE: 97 F | OXYGEN SATURATION: 98 %

## 2018-03-05 PROCEDURE — A6216 NON-STERILE GAUZE<=16 SQ IN: HCPCS

## 2018-03-05 PROCEDURE — A6403 STERILE GAUZE>16 <= 48 SQ IN: HCPCS

## 2018-03-05 PROCEDURE — A6260 WOUND CLEANSER ANY TYPE/SIZE: HCPCS

## 2018-03-05 PROCEDURE — A6446 CONFORM BAND S W>=3" <5"/YD: HCPCS

## 2018-03-05 PROCEDURE — G0299 HHS/HOSPICE OF RN EA 15 MIN: HCPCS

## 2018-03-05 PROCEDURE — A4927 NON-STERILE GLOVES: HCPCS

## 2018-03-05 PROCEDURE — A6449 LT COMPRES BAND >=3" <5"/YD: HCPCS

## 2018-03-05 PROCEDURE — A4649 SURGICAL SUPPLIES: HCPCS

## 2018-03-05 PROCEDURE — A9270 NON-COVERED ITEM OR SERVICE: HCPCS

## 2018-03-05 PROCEDURE — A4550 SURGICAL TRAYS: HCPCS

## 2018-03-06 ENCOUNTER — OFFICE VISIT (OUTPATIENT)
Dept: ORTHOPEDIC SURGERY | Age: 28
End: 2018-03-06

## 2018-03-06 VITALS
HEIGHT: 75 IN | TEMPERATURE: 97.2 F | HEART RATE: 78 BPM | OXYGEN SATURATION: 100 % | DIASTOLIC BLOOD PRESSURE: 82 MMHG | SYSTOLIC BLOOD PRESSURE: 161 MMHG

## 2018-03-06 DIAGNOSIS — S82.851D CLOSED TRIMALLEOLAR FRACTURE OF RIGHT ANKLE WITH ROUTINE HEALING, SUBSEQUENT ENCOUNTER: Primary | ICD-10-CM

## 2018-03-06 DIAGNOSIS — S93.439A SYNDESMOTIC DISRUPTION OF ANKLE, INITIAL ENCOUNTER: ICD-10-CM

## 2018-03-06 DIAGNOSIS — S82.851A CLOSED TRIMALLEOLAR FRACTURE OF RIGHT ANKLE, INITIAL ENCOUNTER: ICD-10-CM

## 2018-03-06 DIAGNOSIS — S82.891A CLOSED FRACTURE OF RIGHT ANKLE, INITIAL ENCOUNTER: ICD-10-CM

## 2018-03-06 RX ORDER — HYDROCODONE BITARTRATE AND ACETAMINOPHEN 7.5; 325 MG/1; MG/1
TABLET ORAL
Qty: 50 TAB | Refills: 0 | OUTPATIENT
Start: 2018-03-06

## 2018-03-06 NOTE — PROGRESS NOTES
Daniel Mi  1990     HISTORY OF PRESENT ILLNESS  Daniel Mi is a 32 y.o. male who presents today for evaluation s/p application of ex fix on 2/27/18 right ankle. Patient is doing very well. He states no pain 0/10. Notes some drainage still. Home health has been coming the last few days for dressing changes. He is taking a 325mg aspirin for DVT prophylaxis. He states he has been non weight bearing. Patient denies any fever, chills, chest pain, shortness of breath or calf pain. There are no new illness or injuries to report since last seen in the office. No changes in medications, allergies, social or family history. PHYSICAL EXAM:   Visit Vitals    /82    Pulse 78    Temp 97.2 °F (36.2 °C)    Ht 6' 3\" (1.905 m)    SpO2 100%      The patient is a well-developed, well-nourished male in no acute distress. The patient is alert and oriented times three. The patient appears to be well groomed. Mood and affect are normal.  ORTHOPEDIC EXAM of right ankle: Inspection: decrease in swelling, fracture blisters with scan bloody drainage. No erythema or pus. Pin sites clean  ttp no calf ttp  Stability: Stable  Strength: n/a    IMAGING: 3 view xray images of right tibia/fibula read and reviewed by myself reveal ex fix in place. No change in fracture site   IMPRESSION:      ICD-10-CM ICD-9-CM    1. Closed trimalleolar fracture of right ankle with routine healing, subsequent encounter S82.851D V54.19 AMB POC XRAY; TIBIA & FIBULA, TWO VIE        PLAN:   1. Patient is doing well post operatively  2. Cont with non weight bearing   3. Cont with 1 325mg aspirin a day for DVT prophylaxis  4.  Pin sites cleaned and dressings applied per Dr. Jana Goodpasture protocol    RTC Monday with Kehinde Blancas PA-C    Patient seen and evaluated by Dr. Ilsa Holter today who agrees with treatment plan   Follow-up Disposition: Not on [address], PA-C  [facility] [phone] [facility]

## 2018-03-06 NOTE — TELEPHONE ENCOUNTER
PLEASE REVIEW ! IT APPEARS THE REQUEST HAS BEEN MADE TOO SOON. [facility] REPORTS THE LAST FILL DATE FOR NORCO AS 03/05/2018 FOR A 9 D/S.     Last Visit: 03/06/2018 with MARBELLA Carey Rued  Date of Surgery: 28/52/9389 application of delta frame external fixator to right lower extremity      Next Appointment: 03/12/2018 with MARBELLA Allen   Previous Refill Encounters: 02/27/2018 per MARBELLA Allen #[phone]     Requested Prescriptions     Pending Prescriptions Disp Refills    HYDROcodone-acetaminophen (NORCO) 7.5-325 mg per tablet 50 Tab 0     Sig: TAKE ONE TABLET BY MOUTH Q 4 - 6 HRS PRN PAIN FOR POST OP PAIN  Indications: Pain

## 2018-03-07 ENCOUNTER — HOME CARE VISIT (OUTPATIENT)
Dept: SCHEDULING | Facility: HOME HEALTH | Age: 28
End: 2018-03-07

## 2018-03-07 VITALS
HEART RATE: 68 BPM | DIASTOLIC BLOOD PRESSURE: 80 MMHG | SYSTOLIC BLOOD PRESSURE: 146 MMHG | TEMPERATURE: 96 F | OXYGEN SATURATION: 97 % | RESPIRATION RATE: 20 BRPM

## 2018-03-07 PROCEDURE — G0299 HHS/HOSPICE OF RN EA 15 MIN: HCPCS

## 2018-03-08 ENCOUNTER — HOME CARE VISIT (OUTPATIENT)
Dept: HOME HEALTH SERVICES | Facility: HOME HEALTH | Age: 28
End: 2018-03-08

## 2018-03-08 VITALS — HEART RATE: 72 BPM | SYSTOLIC BLOOD PRESSURE: 130 MMHG | DIASTOLIC BLOOD PRESSURE: 82 MMHG | OXYGEN SATURATION: 98 %

## 2018-03-08 PROCEDURE — G0299 HHS/HOSPICE OF RN EA 15 MIN: HCPCS

## 2018-03-09 ENCOUNTER — HOME CARE VISIT (OUTPATIENT)
Dept: SCHEDULING | Facility: HOME HEALTH | Age: 28
End: 2018-03-09

## 2018-03-09 PROCEDURE — G0299 HHS/HOSPICE OF RN EA 15 MIN: HCPCS

## 2018-03-10 ENCOUNTER — HOME CARE VISIT (OUTPATIENT)
Dept: SCHEDULING | Facility: HOME HEALTH | Age: 28
End: 2018-03-10

## 2018-03-10 VITALS
DIASTOLIC BLOOD PRESSURE: 78 MMHG | OXYGEN SATURATION: 99 % | SYSTOLIC BLOOD PRESSURE: 110 MMHG | HEART RATE: 78 BPM | RESPIRATION RATE: 18 BRPM | TEMPERATURE: 98.6 F

## 2018-03-10 PROCEDURE — G0299 HHS/HOSPICE OF RN EA 15 MIN: HCPCS

## 2018-03-11 ENCOUNTER — HOME CARE VISIT (OUTPATIENT)
Dept: SCHEDULING | Facility: HOME HEALTH | Age: 28
End: 2018-03-11

## 2018-03-11 PROCEDURE — G0299 HHS/HOSPICE OF RN EA 15 MIN: HCPCS

## 2018-03-12 ENCOUNTER — OFFICE VISIT (OUTPATIENT)
Dept: ORTHOPEDIC SURGERY | Age: 28
End: 2018-03-12

## 2018-03-12 ENCOUNTER — HOSPITAL ENCOUNTER (OUTPATIENT)
Dept: GENERAL RADIOLOGY | Age: 28
Discharge: HOME | End: 2018-03-12
Payer: COMMERCIAL

## 2018-03-12 ENCOUNTER — HOSPITAL ENCOUNTER (OUTPATIENT)
Dept: PREADMISSION TESTING | Age: 28
Discharge: HOME | End: 2018-03-12
Payer: COMMERCIAL

## 2018-03-12 ENCOUNTER — HOME CARE VISIT (OUTPATIENT)
Dept: HOME HEALTH SERVICES | Facility: HOME HEALTH | Age: 28
End: 2018-03-12

## 2018-03-12 VITALS
OXYGEN SATURATION: 98 % | SYSTOLIC BLOOD PRESSURE: 146 MMHG | DIASTOLIC BLOOD PRESSURE: 80 MMHG | TEMPERATURE: 97 F | RESPIRATION RATE: 20 BRPM | HEART RATE: 78 BPM

## 2018-03-12 VITALS
OXYGEN SATURATION: 97 % | SYSTOLIC BLOOD PRESSURE: 139 MMHG | HEIGHT: 75 IN | TEMPERATURE: 97.7 F | HEART RATE: 65 BPM | DIASTOLIC BLOOD PRESSURE: 86 MMHG

## 2018-03-12 DIAGNOSIS — S82.891A CLOSED FRACTURE OF RIGHT ANKLE, INITIAL ENCOUNTER: ICD-10-CM

## 2018-03-12 DIAGNOSIS — S80.829A BLISTER OF LEG: ICD-10-CM

## 2018-03-12 DIAGNOSIS — S82.851A CLOSED TRIMALLEOLAR FRACTURE OF RIGHT ANKLE, INITIAL ENCOUNTER: ICD-10-CM

## 2018-03-12 DIAGNOSIS — Z01.818 PRE-OPERATIVE EXAMINATION: ICD-10-CM

## 2018-03-12 DIAGNOSIS — S82.851D CLOSED TRIMALLEOLAR FRACTURE OF RIGHT ANKLE WITH ROUTINE HEALING, SUBSEQUENT ENCOUNTER: ICD-10-CM

## 2018-03-12 DIAGNOSIS — S93.439A SYNDESMOTIC DISRUPTION OF ANKLE, INITIAL ENCOUNTER: ICD-10-CM

## 2018-03-12 DIAGNOSIS — M25.571 RIGHT ANKLE PAIN, UNSPECIFIED CHRONICITY: Primary | ICD-10-CM

## 2018-03-12 LAB
ALBUMIN SERPL-MCNC: 4.1 G/DL (ref 3.4–5)
ALBUMIN/GLOB SERPL: 1.1 {RATIO} (ref 0.8–1.7)
ALP SERPL-CCNC: 80 U/L (ref 45–117)
ALT SERPL-CCNC: 21 U/L (ref 16–61)
AMPHET UR QL SCN: NEGATIVE
ANION GAP SERPL CALC-SCNC: 6 MMOL/L (ref 3–18)
AST SERPL-CCNC: 17 U/L (ref 15–37)
ATRIAL RATE: 62 BPM
BARBITURATES UR QL SCN: NEGATIVE
BASOPHILS # BLD: 0 K/UL (ref 0–0.06)
BASOPHILS NFR BLD: 0 % (ref 0–2)
BENZODIAZ UR QL: NEGATIVE
BILIRUB SERPL-MCNC: 0.4 MG/DL (ref 0.2–1)
BUN SERPL-MCNC: 11 MG/DL (ref 7–18)
BUN/CREAT SERPL: 10 (ref 12–20)
CALCIUM SERPL-MCNC: 9.8 MG/DL (ref 8.5–10.1)
CALCULATED P AXIS, ECG09: 66 DEGREES
CALCULATED R AXIS, ECG10: 25 DEGREES
CALCULATED T AXIS, ECG11: 45 DEGREES
CANNABINOIDS UR QL SCN: NEGATIVE
CHLORIDE SERPL-SCNC: 103 MMOL/L (ref 100–108)
CO2 SERPL-SCNC: 31 MMOL/L (ref 21–32)
COCAINE UR QL SCN: NEGATIVE
CREAT SERPL-MCNC: 1.07 MG/DL (ref 0.6–1.3)
DIAGNOSIS, 93000: NORMAL
DIFFERENTIAL METHOD BLD: NORMAL
EOSINOPHIL # BLD: 0.1 K/UL (ref 0–0.4)
EOSINOPHIL NFR BLD: 1 % (ref 0–5)
ERYTHROCYTE [DISTWIDTH] IN BLOOD BY AUTOMATED COUNT: 13.5 % (ref 11.6–14.5)
GLOBULIN SER CALC-MCNC: 3.9 G/DL (ref 2–4)
GLUCOSE SERPL-MCNC: 83 MG/DL (ref 74–99)
HCT VFR BLD AUTO: 40.9 % (ref 36–48)
HDSCOM,HDSCOM: NORMAL
HGB BLD-MCNC: 13.3 G/DL (ref 13–16)
LYMPHOCYTES # BLD: 1.9 K/UL (ref 0.9–3.6)
LYMPHOCYTES NFR BLD: 28 % (ref 21–52)
MCH RBC QN AUTO: 27.5 PG (ref 24–34)
MCHC RBC AUTO-ENTMCNC: 32.5 G/DL (ref 31–37)
MCV RBC AUTO: 84.7 FL (ref 74–97)
METHADONE UR QL: NEGATIVE
MONOCYTES # BLD: 0.4 K/UL (ref 0.05–1.2)
MONOCYTES NFR BLD: 6 % (ref 3–10)
NEUTS SEG # BLD: 4.5 K/UL (ref 1.8–8)
NEUTS SEG NFR BLD: 65 % (ref 40–73)
OPIATES UR QL: NEGATIVE
P-R INTERVAL, ECG05: 160 MS
PCP UR QL: NEGATIVE
PLATELET # BLD AUTO: 353 K/UL (ref 135–420)
PMV BLD AUTO: 9.6 FL (ref 9.2–11.8)
POTASSIUM SERPL-SCNC: 4.6 MMOL/L (ref 3.5–5.5)
PROT SERPL-MCNC: 8 G/DL (ref 6.4–8.2)
Q-T INTERVAL, ECG07: 404 MS
QRS DURATION, ECG06: 92 MS
QTC CALCULATION (BEZET), ECG08: 410 MS
RBC # BLD AUTO: 4.83 M/UL (ref 4.7–5.5)
SODIUM SERPL-SCNC: 140 MMOL/L (ref 136–145)
VENTRICULAR RATE, ECG03: 62 BPM
WBC # BLD AUTO: 6.9 K/UL (ref 4.6–13.2)

## 2018-03-12 PROCEDURE — 85025 COMPLETE CBC W/AUTO DIFF WBC: CPT | Performed by: ORTHOPAEDIC SURGERY

## 2018-03-12 PROCEDURE — 71046 X-RAY EXAM CHEST 2 VIEWS: CPT

## 2018-03-12 PROCEDURE — 80053 COMPREHEN METABOLIC PANEL: CPT | Performed by: ORTHOPAEDIC SURGERY

## 2018-03-12 PROCEDURE — 93005 ELECTROCARDIOGRAM TRACING: CPT

## 2018-03-12 PROCEDURE — 36415 COLL VENOUS BLD VENIPUNCTURE: CPT | Performed by: ORTHOPAEDIC SURGERY

## 2018-03-12 PROCEDURE — 80307 DRUG TEST PRSMV CHEM ANLYZR: CPT | Performed by: ORTHOPAEDIC SURGERY

## 2018-03-12 RX ORDER — PROMETHAZINE HYDROCHLORIDE 25 MG/1
25 TABLET ORAL
Qty: 30 TAB | Refills: 0 | Status: SHIPPED | OUTPATIENT
Start: 2018-03-12

## 2018-03-12 RX ORDER — POLYETHYLENE GLYCOL 3350 17 G/17G
17 POWDER, FOR SOLUTION ORAL DAILY
Qty: 10 PACKET | Refills: 1 | Status: SHIPPED | OUTPATIENT
Start: 2018-03-12

## 2018-03-12 RX ORDER — ASPIRIN 325 MG
325 TABLET ORAL DAILY
Qty: 30 TAB | Refills: 0 | Status: SHIPPED | OUTPATIENT
Start: 2018-03-12

## 2018-03-12 RX ORDER — HYDROCODONE BITARTRATE AND ACETAMINOPHEN 7.5; 325 MG/1; MG/1
TABLET ORAL
Qty: 50 TAB | Refills: 0 | Status: SHIPPED | OUTPATIENT
Start: 2018-03-12 | End: 2018-04-02 | Stop reason: CLARIF

## 2018-03-12 NOTE — PATIENT INSTRUCTIONS
Dr. Renée Paul Pre-operative Instructions:      Patient: Ange Aguilar   :  1990     I understand I am to stop taking oral birth control pills, hormonal replacement therapy and all Aspirin, Aspirin containing medications, Non-Steroidal Anti-Inflammatory medications (such as Advil, Aleve, Motrin, Ibuprofen) and or Blood thinner medication such as Coumadin, Plavix, Heparin or others 5 days prior to surgery. I understand I am to STOP taking these Medications 5 days prior to surgery:  I am to get instructions from my prescribing physician. 1. __As listed above_______________________  2. _____________________________________  3. _____________________________________  4. _____________________________________    I understand that if I am taking daily medications for high blood pressure, I can take them the morning of surgery with a small sip of water. I will consult my prescribing physician or call JANE with specific questions. I also understand that:     I am to report important observations or changes that may occur prior to surgery. If I have any changes in my physical condition, such as a rash, a fever, sore throat, abscess, ulcers, nausea, vomiting, or diarrhea. I am to call the office and I am to consult my primary care physician to assess and treat the problem.  I am not to eat or drink anything after midnight the night before my surgery.  I am not to drink alcoholic beverages 24 hours prior to surgery.  I am not to do any illegal drugs prior to surgery.  I am not to smoke at least 24 hours prior to surgery.  I am able and will shower or bathe before surgery. I will use the Hibiclens solution on my surgical site only. The hibiclens directions are one packet a day starting two days before surgery.  I will remove any nail polish, make-up or jewelry prior to arriving for my surgery.       If I wear glasses, contact lenses or dentures they must be removed prior to going to the operating room.  All body piercing and artifical eye-lashes must be removed prior to surgery     I will not wear any aerosol sprays, perfumes or skin creams.  I am to make arrangements for a family member or friend to accompany me to surgery and take me home after my surgery as I will not be allowed to leave the hospital alone. A cab or bus will not be acceptable. Please make arrange for someone to stay with you for 24 hours after surgery.  Patient has expressed understanding of the diagnosis, treatment and planned surgery        Surgery: What to Expect at [address] [place] [address]  This care sheet gives you a general idea about how long it will take for you to recover from your surgery. But each person recovers at a different pace. How can you care for yourself at home? Activity  · Allow your body to heal. Don't move quickly or lift anything heavy until you are feeling better. · Rest when you feel tired. · Your doctor may give you specific instructions on when you can do your normal activities again, such as driving and going back to work. · Be active. Walking is a good choice. Diet  · You can eat your normal diet when you feel well. If your stomach is upset, try bland, low-fat foods like plain rice, broiled chicken, toast, and yogurt. · If your bowel movements are not regular right after surgery, try to avoid constipation and straining. Drink plenty of water. Your doctor may suggest fiber, a stool softener, or a mild laxative. Medicines  · Your doctor will tell you if and when you can restart your medicines. He or she will also give you instructions about taking any new medicines. · If you take blood thinners, such as warfarin (Coumadin), clopidogrel (Plavix), or aspirin, be sure to talk to your doctor. He or she will tell you if and when to start taking those medicines again. Make sure that you understand exactly what your doctor wants you to do. · Be safe with medicines. Read and follow all instructions on the label. ¨ If the doctor gave you a prescription medicine for pain, take it as prescribed. ¨ If you are not taking a prescription pain medicine, ask your doctor if you can take an over-the-counter medicine. Incision care  · You will have a dressing over the cut (incision). A dressing helps the incision heal and protects it. Your doctor will tell you how to take care of this. · If you have strips of tape on the cut the doctor made, leave the tape on for a week or until it falls off. · If you had stitches, your doctor will tell you when to come back to have them removed. · If you have skin adhesive on the cut, leave it on until it falls off. Skin adhesive is also called liquid stitches. · Change the bandage every day. · Wash the area daily with warm, soapy water, and pat it dry. Don't use hydrogen peroxide or alcohol. They can slow healing. · You may cover the area with a gauze bandage if it oozes fluid or rubs against clothing. · You may shower 24 to 48 hours after surgery. Pat the incision dry. Don't swim or take a bath for the first 2 weeks, or until your doctor tells you it is okay. Follow-up care is a key part of your treatment and safety. Be sure to make and go to all appointments, and call your doctor if you are having problems. It's also a good idea to know your test results and keep a list of the medicines you take. When should you call for help? Call 911 anytime you think you may need emergency care. For example, call if:  · You passed out (lost consciousness). · You have severe trouble breathing. · You have sudden chest pain and shortness of breath, or you cough up blood. Call your doctor now or seek immediate medical care if:  · You have pain that does not get better after you take pain medicine. · You have loose stitches, or your incision comes open. · You are bleeding through your dressing.  A small amount of blood is normal.  · You have signs of infection, such as:  ¨ Increased pain, swelling, warmth, or redness. ¨ Red streaks leading from the incision. ¨ Pus draining from the incision. ¨ A fever. · You have symptoms of a blood clot in your arm or leg (called a deep vein thrombosis). These may include:  ¨ Pain in your calf, back of the knee, thigh, or groin. ¨ Redness and swelling in the arm, leg, or groin. Watch closely for any changes in your health, and be sure to contact your doctor if:  · You do not have a bowel movement after taking a laxative. Where can you learn more? Go to http://tony-iris.info/  Enter V401 in the search box to learn more about \"Surgery: What to Expect at Home. \"  © 8811-8291 [employer]. Care instructions adapted under license by [employer] (which disclaims liability or warranty for this information). This care instruction is for use with your licensed healthcare professional. If you have questions about a medical condition or this instruction, always ask your healthcare professional. [place] [address] any warranty or liability for your use of this information. Content Version: 78.0.380296; Current as of: November 20, 2015          Acute Pain After Surgery: Care Instructions  Your Care Instructions      It's common to have some pain after surgery. Pain doesn't mean that something is wrong or that the surgery didn't go well. But when the pain is severe, it's important to work with your doctor to manage it. It's also important to be aware of a few facts about pain and pain medicine. · You are the only person who knows what your pain feels like. So be sure to tell your doctor when you are in pain or when the pain changes. Then he or she will know how to adjust your medicines. · Pain is often easier to control right after it starts. So it may be better to take regular doses of pain medicine and not wait until the pain gets bad. · Medicine can help control pain. But this doesn't mean you'll have no pain. Medicine works to keep the pain at a level you can live with. With time, you will feel better. Follow-up care is a key part of your treatment and safety. Be sure to make and go to all appointments, and call your doctor if you are having problems. It's also a good idea to know your test results and keep a list of the medicines you take. How can you care for yourself at home? · Be safe with medicines. Read and follow all instructions on the label. ¨ If the doctor gave you a prescription medicine for pain, take it as prescribed. ¨ If you are not taking a prescription pain medicine, ask your doctor if you can take an over-the-counter medicine. · If you take an over-the-counter pain medicine, such as acetaminophen (Tylenol), ibuprofen (Advil, Motrin), or naproxen (Aleve), read and follow all instructions on the label. · Do not take two or more pain medicines at the same time unless the doctor told you to. · Do not drink alcohol while you are taking pain medicines. · Try to walk each day if your doctor recommends it. Start by walking a little more than you did the day before. Bit by bit, increase the amount you walk. Walking increases blood flow. It also helps prevent pneumonia and constipation. · To prevent constipation from opioid pain medicines:  ¨ Talk to your doctor about a laxative. ¨ Include fruits, vegetables, beans, and whole grains in your diet each day. These foods are high in fiber. ¨ Drink plenty of fluids, enough so that your urine is light yellow or clear like water. Drink water, fruit juice, or other drinks that do not contain caffeine or alcohol. If you have kidney, heart, or liver disease and have to limit fluids, talk with your doctor before you increase the amount of fluids you drink. ¨ Take a fiber supplement, such as Citrucel or Metamucil, every day if needed. Read and follow all instructions on the label.  If you take pain medicine for more than a few days, talk to your doctor before you take fiber. When should you call for help? Call your doctor now or seek immediate medical care if:  ? · Your pain gets worse. ? · Your pain is not controlled by medicine. ? Watch closely for changes in your health, and be sure to contact your doctor if you have any problems. Where can you learn more? Go to http://tony-iris.info/. Enter [phone] in the search box to learn more about \"Acute Pain After Surgery: Care Instructions. \"  Current as of: March 20, 2017  Content Version: 11.4  © 3434-2286 [employer]. Care instructions adapted under license by [employer] (which disclaims liability or warranty for this information). If you have questions about a medical condition or this instruction, always ask your healthcare professional. [address] any warranty or liability for your use of this information.
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
## 2018-03-12 NOTE — MR AVS SNAPSHOT
[address], [address] 200 [facility] 
[phone] Patient: Guillaume Bear MRN: [id number] :1990 Visit Information Date & Time Provider Department Dept. Phone Encounter #  
 3/12/2018  9:15 AM Nader Kaminski [address] [facility]  Follow-up Instructions Return in about 1 week (around 3/19/2018) for post surgical evaluation. Your Appointments 3/21/2018  1:15 PM  
POST OP with Nader Kaminski PA-C  
[address] ([facility]) Appt Note: Removal Ext fix Right ankle & ORIF rt ankle [address] [facility], [facility] 100 200 [facility]  
[phone] [address] [place] [address] Upcoming Health Maintenance Date Due DTaP/Tdap/Td series (1 - Tdap) 10/10/2011 Influenza Age 5 to Adult 2017 Allergies as of 3/12/2018  Review Complete On: 3/12/2018 By: Nader Kaminski PA-C No Known Allergies Current Immunizations  Never Reviewed No immunizations on file. Not reviewed this visit You Were Diagnosed With   
  
 Codes Comments Right ankle pain, unspecified chronicity    -  Primary ICD-10-CM: M25.571 ICD-9-CM: 719.47 Closed trimalleolar fracture of right ankle with routine healing, subsequent encounter     ICD-10-CM: S82.851D ICD-9-CM: V54.19 Pre-operative examination     ICD-10-CM: J86.286 ICD-9-CM: V72.84 Blister of leg     ICD-10-CM: C14.312F 
ICD-9-CM: 916.2 Vitals BP Pulse Temp Height(growth percentile) SpO2 Smoking Status 139/86 65 97.7 °F (36.5 °C) 6' 3\" (1.905 m) 97% Never Smoker Your Updated Medication List  
  
   
This list is accurate as of 3/12/18  9:29 AM.  Always use your most recent med list.  
  
  
  
  
 aspirin 325 mg tablet Commonly known as:  ASPIRIN Take 1 Tab by mouth daily. HYDROcodone-acetaminophen 7.5-325 mg per tablet Commonly known as:  NORCO  
TAKE ONE TABLET BY MOUTH Q 4 - 6 HRS PRN PAIN **AFTER SURGERY**DO NOT TAKE BEFORE SURGERY  Indications: Pain  
  
 ibuprofen 200 mg tablet Commonly known as:  MOTRIN Take 200 mg by mouth every six (6) hours as needed for Pain.  
  
 polyethylene glycol 17 gram packet Commonly known as:  Manuelita Rosa Take 1 Packet by mouth daily. promethazine 25 mg tablet Commonly known as:  PHENERGAN Take 1 Tab by mouth every six (6) hours as needed for Nausea. We Performed the Following AMB POC XRAY, ANKLE; COMPLETE, 3+ VIE [42435 CPT(R)] AMB POC XRAY; TIBIA & FIBULA, TWO VIE [73669 CPT(R)] Follow-up Instructions Return in about 1 week (around 3/19/2018) for post surgical evaluation. To-Do List   
 2018 To Be Determined Appointment with Fatimah Horton RN at [address] [place] [address] [facility]  
  
 2018 To Be Determined Appointment with Fatimah Horton RN at [address] [place] [address] [facility]  
  
 2018 To Be Determined Appointment with Fatimah Horton RN at [address] [place] [address] Patient Instructions Dr. Corinne Alarcon Pre-operative Instructions: 
 
 
Patient: Tina Berry :  1990 I understand I am to stop taking oral birth control pills, hormonal replacement therapy and all Aspirin, Aspirin containing medications, Non-Steroidal Anti-Inflammatory medications (such as Advil, Aleve, Motrin, Ibuprofen) and or Blood thinner medication such as Coumadin, Plavix, Heparin or others 5 days prior to surgery. I understand I am to STOP taking these Medications 5 days prior to surgery: 
I am to get instructions from my prescribing physician. 1. __As listed above_______________________ 2. _____________________________________ 3. _____________________________________ 4. _____________________________________ I understand that if I am taking daily medications for high blood pressure, I can take them the morning of surgery with a small sip of water. I will consult my prescribing physician or call JANE with specific questions. I also understand that: ? I am to report important observations or changes that may occur prior to surgery. If I have any changes in my physical condition, such as a rash, a fever, sore throat, abscess, ulcers, nausea, vomiting, or diarrhea. I am to call the office and I am to consult my primary care physician to assess and treat the problem. ? I am not to eat or drink anything after midnight the night before my surgery. ? I am not to drink alcoholic beverages 24 hours prior to surgery. ? I am not to do any illegal drugs prior to surgery. ? I am not to smoke at least 24 hours prior to surgery. ? I am able and will shower or bathe before surgery. I will use the Hibiclens solution on my surgical site only. The hibiclens directions are one packet a day starting two days before surgery. ? I will remove any nail polish, make-up or jewelry prior to arriving for my surgery. ? If I wear glasses, contact lenses or dentures they must be removed prior to going to the operating room. ? All body piercing and artifical eye-lashes must be removed prior to surgery ? I will not wear any aerosol sprays, perfumes or skin creams. ? I am to make arrangements for a family member or friend to accompany me to surgery and take me home after my surgery as I will not be allowed to leave the hospital alone. A cab or bus will not be acceptable. Please make arrange for someone to stay with you for 24 hours after surgery. ? Patient has expressed understanding of the diagnosis, treatment and planned surgery Surgery: What to Expect at [facility] Your Recovery This care sheet gives you a general idea about how long it will take for you to recover from your surgery. But each person recovers at a different pace. How can you care for yourself at home? Activity · Allow your body to heal. Don't move quickly or lift anything heavy until you are feeling better. · Rest when you feel tired. · Your doctor may give you specific instructions on when you can do your normal activities again, such as driving and going back to work. · Be active. Walking is a good choice. Diet · You can eat your normal diet when you feel well. If your stomach is upset, try bland, low-fat foods like plain rice, broiled chicken, toast, and yogurt. · If your bowel movements are not regular right after surgery, try to avoid constipation and straining. Drink plenty of water. Your doctor may suggest fiber, a stool softener, or a mild laxative. Medicines · Your doctor will tell you if and when you can restart your medicines. He or she will also give you instructions about taking any new medicines. · If you take blood thinners, such as warfarin (Coumadin), clopidogrel (Plavix), or aspirin, be sure to talk to your doctor. He or she will tell you if and when to start taking those medicines again. Make sure that you understand exactly what your doctor wants you to do. · Be safe with medicines. Read and follow all instructions on the label. ¨ If the doctor gave you a prescription medicine for pain, take it as prescribed. ¨ If you are not taking a prescription pain medicine, ask your doctor if you can take an over-the-counter medicine. Incision care · You will have a dressing over the cut (incision). A dressing helps the incision heal and protects it. Your doctor will tell you how to take care of this. · If you have strips of tape on the cut the doctor made, leave the tape on for a week or until it falls off. · If you had stitches, your doctor will tell you when to come back to have them removed. · If you have skin adhesive on the cut, leave it on until it falls off. Skin adhesive is also called liquid stitches. · Change the bandage every day. · Wash the area daily with warm, soapy water, and pat it dry. Don't use hydrogen peroxide or alcohol. They can slow healing. · You may cover the area with a gauze bandage if it oozes fluid or rubs against clothing. · You may shower 24 to 48 hours after surgery. Pat the incision dry. Don't swim or take a bath for the first 2 weeks, or until your doctor tells you it is okay. Follow-up care is a key part of your treatment and safety. Be sure to make and go to all appointments, and call your doctor if you are having problems. It's also a good idea to know your test results and keep a list of the medicines you take. When should you call for help? Call 911 anytime you think you may need emergency care. For example, call if: 
· You passed out (lost consciousness). · You have severe trouble breathing. · You have sudden chest pain and shortness of breath, or you cough up blood. Call your doctor now or seek immediate medical care if: 
· You have pain that does not get better after you take pain medicine. · You have loose stitches, or your incision comes open. · You are bleeding through your dressing. A small amount of blood is normal. 
· You have signs of infection, such as: 
¨ Increased pain, swelling, warmth, or redness. ¨ Red streaks leading from the incision. ¨ Pus draining from the incision. ¨ A fever. · You have symptoms of a blood clot in your arm or leg (called a deep vein thrombosis). These may include: 
¨ Pain in your calf, back of the knee, thigh, or groin. ¨ Redness and swelling in the arm, leg, or groin. Watch closely for any changes in your health, and be sure to contact your doctor if: 
· You do not have a bowel movement after taking a laxative. Where can you learn more? Go to http://tony-iris.info/ Enter U206 in the search box to learn more about \"Surgery: What to Expect at Home. \" 
 © 5506-7903 [employer]. Care instructions adapted under license by [employer] (which disclaims liability or warranty for this information). This care instruction is for use with your licensed healthcare professional. If you have questions about a medical condition or this instruction, always ask your healthcare professional. [address] any warranty or liability for your use of this information. Content Version: 51.0.469341; Current as of: November 20, 2015 Acute Pain After Surgery: Care Instructions Your Care Instructions It's common to have some pain after surgery. Pain doesn't mean that something is wrong or that the surgery didn't go well. But when the pain is severe, it's important to work with your doctor to manage it. It's also important to be aware of a few facts about pain and pain medicine. · You are the only person who knows what your pain feels like. So be sure to tell your doctor when you are in pain or when the pain changes. Then he or she will know how to adjust your medicines. · Pain is often easier to control right after it starts. So it may be better to take regular doses of pain medicine and not wait until the pain gets bad. · Medicine can help control pain. But this doesn't mean you'll have no pain. Medicine works to keep the pain at a level you can live with. With time, you will feel better. Follow-up care is a key part of your treatment and safety. Be sure to make and go to all appointments, and call your doctor if you are having problems. It's also a good idea to know your test results and keep a list of the medicines you take. How can you care for yourself at home? · Be safe with medicines. Read and follow all instructions on the label. ¨ If the doctor gave you a prescription medicine for pain, take it as prescribed.  
¨ If you are not taking a prescription pain medicine, ask your doctor if you can take an over-the-counter medicine. · If you take an over-the-counter pain medicine, such as acetaminophen (Tylenol), ibuprofen (Advil, Motrin), or naproxen (Aleve), read and follow all instructions on the label. · Do not take two or more pain medicines at the same time unless the doctor told you to. · Do not drink alcohol while you are taking pain medicines. · Try to walk each day if your doctor recommends it. Start by walking a little more than you did the day before. Bit by bit, increase the amount you walk. Walking increases blood flow. It also helps prevent pneumonia and constipation. · To prevent constipation from opioid pain medicines: ¨ Talk to your doctor about a laxative. ¨ Include fruits, vegetables, beans, and whole grains in your diet each day. These foods are high in fiber. ¨ Drink plenty of fluids, enough so that your urine is light yellow or clear like water. Drink water, fruit juice, or other drinks that do not contain caffeine or alcohol. If you have kidney, heart, or liver disease and have to limit fluids, talk with your doctor before you increase the amount of fluids you drink. ¨ Take a fiber supplement, such as Citrucel or Metamucil, every day if needed. Read and follow all instructions on the label. If you take pain medicine for more than a few days, talk to your doctor before you take fiber. When should you call for help? Call your doctor now or seek immediate medical care if: 
? · Your pain gets worse. ? · Your pain is not controlled by medicine. ? Watch closely for changes in your health, and be sure to contact your doctor if you have any problems. Where can you learn more? Go to http://tony-iris.info/. Enter ([phone] [phone] in the search box to learn more about \"Acute Pain After Surgery: Care Instructions. \" Current as of: March 20, 2017 Content Version: 11.4 © 3856-0985 [employer].  Care instructions adapted under license by [address] (which disclaims liability or warranty for this information). If you have questions about a medical condition or this instruction, always ask your healthcare professional. [address] any warranty or liability for your use of this information. Introducing \[facility] [place] [facility]\"" & HEALTH SERVICES! [facility] introduces [employer] patient portal. Now you can access parts of your medical record, email your doctor's office, and request medication refills online. 1. In your internet browser, go to https://[employer]. [employer] 2. Click on the First Time User? Click Here link in the Sign In box. You will see the New Member Sign Up page. 3. Enter your [employer] Access Code exactly as it appears below. You will not need to use this code after youve completed the sign-up process. If you do not sign up before the expiration date, you must request a new code. · [employer] Access Code: [email or web address] Expires: 5/24/2018  4:13 PM 
 
4. Enter the last four digits of your Social Security Number (xxxx) and Date of Birth (mm/dd/yyyy) as indicated and click Submit. You will be taken to the next sign-up page. 5. Create a [employer] ID. This will be your [employer] login ID and cannot be changed, so think of one that is secure and easy to remember. 6. Create a [employer] password. You can change your password at any time. 7. Enter your Password Reset Question and Answer. This can be used at a later time if you forget your password. 8. Enter your e-mail address. You will receive e-mail notification when new information is available in [address]. 9. Click Sign Up. You can now view and download portions of your medical record. 10. Click the Download Summary menu link to download a portable copy of your medical information. If you have questions, please visit the Frequently Asked Questions section of the [employer] website.  Remember, [employer] is NOT to be used for urgent needs. For medical emergencies, dial 911. Now available from your iPhone and Android! Please provide this summary of care documentation to your next provider. Your primary care clinician is listed as NONE. If you have any questions after today's visit, please call [phone].
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
## 2018-03-12 NOTE — PROCEDURES
X-rays of the right ankle and tib/fib reveal external fixation of the tibia and calcaneus. Medial space widening as well as fibula and posterior tibia fractures noted.

## 2018-03-13 ENCOUNTER — ANESTHESIA EVENT (OUTPATIENT)
Dept: SURGERY | Age: 28
End: 2018-03-13
Payer: COMMERCIAL

## 2018-03-13 ENCOUNTER — HOME CARE VISIT (OUTPATIENT)
Dept: SCHEDULING | Facility: HOME HEALTH | Age: 28
End: 2018-03-13

## 2018-03-13 ENCOUNTER — TELEPHONE (OUTPATIENT)
Dept: ORTHOPEDIC SURGERY | Age: 28
End: 2018-03-13

## 2018-03-13 VITALS
RESPIRATION RATE: 18 BRPM | HEART RATE: 76 BPM | TEMPERATURE: 98.2 F | SYSTOLIC BLOOD PRESSURE: 120 MMHG | OXYGEN SATURATION: 95 % | DIASTOLIC BLOOD PRESSURE: 70 MMHG

## 2018-03-13 VITALS
HEART RATE: 78 BPM | RESPIRATION RATE: 20 BRPM | DIASTOLIC BLOOD PRESSURE: 84 MMHG | SYSTOLIC BLOOD PRESSURE: 126 MMHG | OXYGEN SATURATION: 98 %

## 2018-03-13 PROCEDURE — G0299 HHS/HOSPICE OF RN EA 15 MIN: HCPCS

## 2018-03-13 NOTE — LETTER
NOTIFICATION RETURN TO WORK / SCHOOL 
 
3/13/2018 11:56 AM 
 
Mr. Tina Berry [address] [place] [id number] To Whom It May Concern: 
 
Tina Berry is currently under the care of [address] [place], [place] [address]. He will be out of work X 3-4 months after surgery. If there are questions or concerns please have the patient contact our office.  
 
 
 
Sincerely, 
 
 
Teresa Gallardo MD

## 2018-03-13 NOTE — TELEPHONE ENCOUNTER
Patient seen yesterday by Everette Patricia. Patient needs note stating how long patient will be out from 02/23/2018 till she is released to return to work. Patient needs the letter today to take to her job tomorrow. Her job told her she has 48 hrs from today to send in note. Please call patient [phone] to  at [facility] office.

## 2018-03-14 ENCOUNTER — ANESTHESIA (OUTPATIENT)
Dept: SURGERY | Age: 28
End: 2018-03-14
Payer: COMMERCIAL

## 2018-03-14 ENCOUNTER — HOME CARE VISIT (OUTPATIENT)
Dept: HOME HEALTH SERVICES | Facility: HOME HEALTH | Age: 28
End: 2018-03-14

## 2018-03-14 ENCOUNTER — HOSPITAL ENCOUNTER (OUTPATIENT)
Age: 28
Setting detail: OBSERVATION
Discharge: HOME | End: 2018-03-15
Attending: ORTHOPAEDIC SURGERY | Admitting: ORTHOPAEDIC SURGERY
Payer: COMMERCIAL

## 2018-03-14 ENCOUNTER — APPOINTMENT (OUTPATIENT)
Dept: GENERAL RADIOLOGY | Age: 28
End: 2018-03-14
Attending: ORTHOPAEDIC SURGERY
Payer: COMMERCIAL

## 2018-03-14 DIAGNOSIS — S82.851A FRACTURE OF ANKLE, TRIMALLEOLAR, CLOSED, RIGHT, INITIAL ENCOUNTER: Primary | ICD-10-CM

## 2018-03-14 PROBLEM — S82.863A MAISONNEUVE FRACTURE: Status: ACTIVE | Noted: 2018-03-14

## 2018-03-14 PROBLEM — S82.209A FRACTURE TIBIA/FIBULA: Status: ACTIVE | Noted: 2018-03-14

## 2018-03-14 PROBLEM — S82.409A FRACTURE TIBIA/FIBULA: Status: ACTIVE | Noted: 2018-03-14

## 2018-03-14 PROCEDURE — 76010000134 HC OR TIME 3.5 TO 4 HR: Performed by: ORTHOPAEDIC SURGERY

## 2018-03-14 PROCEDURE — C1713 ANCHOR/SCREW BN/BN,TIS/BN: HCPCS | Performed by: ORTHOPAEDIC SURGERY

## 2018-03-14 PROCEDURE — 77030020782 HC GWN BAIR PAWS FLX 3M -B: Performed by: ORTHOPAEDIC SURGERY

## 2018-03-14 PROCEDURE — 74011250636 HC RX REV CODE- 250/636

## 2018-03-14 PROCEDURE — 77030032490 HC SLV COMPR SCD KNE COVD -B: Performed by: ORTHOPAEDIC SURGERY

## 2018-03-14 PROCEDURE — 74011250636 HC RX REV CODE- 250/636: Performed by: ORTHOPAEDIC SURGERY

## 2018-03-14 PROCEDURE — 77030000032 HC CUF TRNQT ZIMM -B: Performed by: ORTHOPAEDIC SURGERY

## 2018-03-14 PROCEDURE — 99218 HC RM OBSERVATION: CPT

## 2018-03-14 PROCEDURE — 77030000031 HC BIT DRL QC SYNT -C: Performed by: ORTHOPAEDIC SURGERY

## 2018-03-14 PROCEDURE — 77030003862 HC BIT DRL SYNT -B: Performed by: ORTHOPAEDIC SURGERY

## 2018-03-14 PROCEDURE — 77030018836 HC SOL IRR NACL ICUM -A: Performed by: ORTHOPAEDIC SURGERY

## 2018-03-14 PROCEDURE — 76060000038 HC ANESTHESIA 3.5 TO 4 HR: Performed by: ORTHOPAEDIC SURGERY

## 2018-03-14 PROCEDURE — 77030008462 HC STPLR SKN PROX J&J -A: Performed by: ORTHOPAEDIC SURGERY

## 2018-03-14 PROCEDURE — 76210000017 HC OR PH I REC 1.5 TO 2 HR: Performed by: ORTHOPAEDIC SURGERY

## 2018-03-14 PROCEDURE — 77030008847 HC WRE K SYNT -A: Performed by: ORTHOPAEDIC SURGERY

## 2018-03-14 PROCEDURE — 77030002933 HC SUT MCRYL J&J -A: Performed by: ORTHOPAEDIC SURGERY

## 2018-03-14 PROCEDURE — 74011250637 HC RX REV CODE- 250/637: Performed by: NURSE ANESTHETIST, CERTIFIED REGISTERED

## 2018-03-14 PROCEDURE — 74011250637 HC RX REV CODE- 250/637: Performed by: ORTHOPAEDIC SURGERY

## 2018-03-14 PROCEDURE — 77030028224 HC PDNG CST BSNM -A: Performed by: ORTHOPAEDIC SURGERY

## 2018-03-14 PROCEDURE — 74011250636 HC RX REV CODE- 250/636: Performed by: NURSE ANESTHETIST, CERTIFIED REGISTERED

## 2018-03-14 PROCEDURE — 77030002916 HC SUT ETHLN J&J -A: Performed by: ORTHOPAEDIC SURGERY

## 2018-03-14 PROCEDURE — 77030019605: Performed by: ORTHOPAEDIC SURGERY

## 2018-03-14 PROCEDURE — 77030009834 HC MSK O2 TRACH VYRM -A

## 2018-03-14 PROCEDURE — 77030031139 HC SUT VCRL2 J&J -A: Performed by: ORTHOPAEDIC SURGERY

## 2018-03-14 PROCEDURE — 73610 X-RAY EXAM OF ANKLE: CPT

## 2018-03-14 PROCEDURE — 77030027138 HC INCENT SPIROMETER -A

## 2018-03-14 PROCEDURE — 77030013079 HC BLNKT BAIR HGGR 3M -A: Performed by: ANESTHESIOLOGY

## 2018-03-14 PROCEDURE — 77030011640 HC PAD GRND REM COVD -A: Performed by: ORTHOPAEDIC SURGERY

## 2018-03-14 PROCEDURE — 74011000250 HC RX REV CODE- 250

## 2018-03-14 DEVICE — SCREW BNE L48MM DIA2.7MM ANK S STL ST VAR ANG LOK FULL THRD: Type: IMPLANTABLE DEVICE | Site: ANKLE | Status: FUNCTIONAL

## 2018-03-14 DEVICE — SCREW BNE L34MM DIA2.7MM CORT S STL ST T8 STARDRV RECESS: Type: IMPLANTABLE DEVICE | Site: ANKLE | Status: FUNCTIONAL

## 2018-03-14 DEVICE — SCREW BNE L48MM DIA2.7MM MTPHSEAL S STL ST FULL THRD T8: Type: IMPLANTABLE DEVICE | Site: ANKLE | Status: FUNCTIONAL

## 2018-03-14 DEVICE — SCREW BNE L42MM DIA2.7MM CORT S STL ST T8 STARDRV RECESS: Type: IMPLANTABLE DEVICE | Site: ANKLE | Status: FUNCTIONAL

## 2018-03-14 DEVICE — SCREW BNE L55MM DIA3.5MM CORT S STL ST NONCANNULATED LOK: Type: IMPLANTABLE DEVICE | Site: ANKLE | Status: FUNCTIONAL

## 2018-03-14 DEVICE — PLATE BNE L72MM 4 H ST DST POSTEROLATERAL TIB S STL T SHP: Type: IMPLANTABLE DEVICE | Site: ANKLE | Status: FUNCTIONAL

## 2018-03-14 DEVICE — SCREW BNE L45MM DIA3.5MM CORT S STL ST NONCANNULATED LOK: Type: IMPLANTABLE DEVICE | Site: ANKLE | Status: FUNCTIONAL

## 2018-03-14 DEVICE — SCREW BNE L46MM DIA2.7MM ANK S STL ST VAR ANG LOK FULL THRD: Type: IMPLANTABLE DEVICE | Site: ANKLE | Status: FUNCTIONAL

## 2018-03-14 RX ORDER — SODIUM CHLORIDE 0.9 % (FLUSH) 0.9 %
5-10 SYRINGE (ML) INJECTION AS NEEDED
Status: DISCONTINUED | OUTPATIENT
Start: 2018-03-14 | End: 2018-03-14 | Stop reason: HOSPADM

## 2018-03-14 RX ORDER — CEFAZOLIN SODIUM 2 G/50ML
2 SOLUTION INTRAVENOUS EVERY 8 HOURS
Status: DISCONTINUED | OUTPATIENT
Start: 2018-03-14 | End: 2018-03-15 | Stop reason: HOSPADM

## 2018-03-14 RX ORDER — SODIUM CHLORIDE, SODIUM LACTATE, POTASSIUM CHLORIDE, CALCIUM CHLORIDE 600; 310; 30; 20 MG/100ML; MG/100ML; MG/100ML; MG/100ML
100 INJECTION, SOLUTION INTRAVENOUS CONTINUOUS
Status: DISCONTINUED | OUTPATIENT
Start: 2018-03-14 | End: 2018-03-14 | Stop reason: HOSPADM

## 2018-03-14 RX ORDER — CEFAZOLIN SODIUM IN 0.9 % NACL 2 G/100 ML
PLASTIC BAG, INJECTION (ML) INTRAVENOUS AS NEEDED
Status: DISCONTINUED | OUTPATIENT
Start: 2018-03-14 | End: 2018-03-14 | Stop reason: HOSPADM

## 2018-03-14 RX ORDER — LIDOCAINE HYDROCHLORIDE 10 MG/ML
0.1 INJECTION, SOLUTION EPIDURAL; INFILTRATION; INTRACAUDAL; PERINEURAL AS NEEDED
Status: DISCONTINUED | OUTPATIENT
Start: 2018-03-14 | End: 2018-03-14 | Stop reason: HOSPADM

## 2018-03-14 RX ORDER — DEXTROSE 50 % IN WATER (D50W) INTRAVENOUS SYRINGE
25-50 AS NEEDED
Status: DISCONTINUED | OUTPATIENT
Start: 2018-03-14 | End: 2018-03-14 | Stop reason: HOSPADM

## 2018-03-14 RX ORDER — SODIUM CHLORIDE 0.9 % (FLUSH) 0.9 %
5-10 SYRINGE (ML) INJECTION AS NEEDED
Status: DISCONTINUED | OUTPATIENT
Start: 2018-03-14 | End: 2018-03-15 | Stop reason: HOSPADM

## 2018-03-14 RX ORDER — MAGNESIUM SULFATE 100 %
4 CRYSTALS MISCELLANEOUS AS NEEDED
Status: DISCONTINUED | OUTPATIENT
Start: 2018-03-14 | End: 2018-03-14 | Stop reason: HOSPADM

## 2018-03-14 RX ORDER — FENTANYL CITRATE 50 UG/ML
50 INJECTION, SOLUTION INTRAMUSCULAR; INTRAVENOUS AS NEEDED
Status: DISCONTINUED | OUTPATIENT
Start: 2018-03-14 | End: 2018-03-14 | Stop reason: HOSPADM

## 2018-03-14 RX ORDER — FAMOTIDINE 20 MG/1
20 TABLET, FILM COATED ORAL ONCE
Status: COMPLETED | OUTPATIENT
Start: 2018-03-14 | End: 2018-03-14

## 2018-03-14 RX ORDER — ONDANSETRON 2 MG/ML
INJECTION INTRAMUSCULAR; INTRAVENOUS AS NEEDED
Status: DISCONTINUED | OUTPATIENT
Start: 2018-03-14 | End: 2018-03-14 | Stop reason: HOSPADM

## 2018-03-14 RX ORDER — HYDROCODONE BITARTRATE AND ACETAMINOPHEN 7.5; 325 MG/1; MG/1
2 TABLET ORAL
Status: DISCONTINUED | OUTPATIENT
Start: 2018-03-14 | End: 2018-03-15 | Stop reason: HOSPADM

## 2018-03-14 RX ORDER — CEFAZOLIN SODIUM 2 G/50ML
2 SOLUTION INTRAVENOUS ONCE
Status: DISCONTINUED | OUTPATIENT
Start: 2018-03-14 | End: 2018-03-14 | Stop reason: HOSPADM

## 2018-03-14 RX ORDER — SODIUM CHLORIDE, SODIUM LACTATE, POTASSIUM CHLORIDE, CALCIUM CHLORIDE 600; 310; 30; 20 MG/100ML; MG/100ML; MG/100ML; MG/100ML
INJECTION, SOLUTION INTRAVENOUS
Status: DISCONTINUED | OUTPATIENT
Start: 2018-03-14 | End: 2018-03-14 | Stop reason: HOSPADM

## 2018-03-14 RX ORDER — SODIUM CHLORIDE, SODIUM LACTATE, POTASSIUM CHLORIDE, CALCIUM CHLORIDE 600; 310; 30; 20 MG/100ML; MG/100ML; MG/100ML; MG/100ML
25 INJECTION, SOLUTION INTRAVENOUS CONTINUOUS
Status: DISCONTINUED | OUTPATIENT
Start: 2018-03-14 | End: 2018-03-14 | Stop reason: HOSPADM

## 2018-03-14 RX ORDER — ROCURONIUM BROMIDE 10 MG/ML
INJECTION, SOLUTION INTRAVENOUS AS NEEDED
Status: DISCONTINUED | OUTPATIENT
Start: 2018-03-14 | End: 2018-03-14 | Stop reason: HOSPADM

## 2018-03-14 RX ORDER — MORPHINE SULFATE 2 MG/ML
4 INJECTION, SOLUTION INTRAMUSCULAR; INTRAVENOUS
Status: DISCONTINUED | OUTPATIENT
Start: 2018-03-14 | End: 2018-03-14 | Stop reason: HOSPADM

## 2018-03-14 RX ORDER — MIDAZOLAM HYDROCHLORIDE 1 MG/ML
INJECTION, SOLUTION INTRAMUSCULAR; INTRAVENOUS AS NEEDED
Status: DISCONTINUED | OUTPATIENT
Start: 2018-03-14 | End: 2018-03-14 | Stop reason: HOSPADM

## 2018-03-14 RX ORDER — FENTANYL CITRATE 50 UG/ML
INJECTION, SOLUTION INTRAMUSCULAR; INTRAVENOUS AS NEEDED
Status: DISCONTINUED | OUTPATIENT
Start: 2018-03-14 | End: 2018-03-14 | Stop reason: HOSPADM

## 2018-03-14 RX ORDER — NEOSTIGMINE METHYLSULFATE 5 MG/5 ML
SYRINGE (ML) INTRAVENOUS AS NEEDED
Status: DISCONTINUED | OUTPATIENT
Start: 2018-03-14 | End: 2018-03-14 | Stop reason: HOSPADM

## 2018-03-14 RX ORDER — PROPOFOL 10 MG/ML
INJECTION, EMULSION INTRAVENOUS AS NEEDED
Status: DISCONTINUED | OUTPATIENT
Start: 2018-03-14 | End: 2018-03-14 | Stop reason: HOSPADM

## 2018-03-14 RX ORDER — SODIUM CHLORIDE 0.9 % (FLUSH) 0.9 %
5-10 SYRINGE (ML) INJECTION EVERY 8 HOURS
Status: DISCONTINUED | OUTPATIENT
Start: 2018-03-14 | End: 2018-03-15 | Stop reason: HOSPADM

## 2018-03-14 RX ORDER — HYDROMORPHONE HYDROCHLORIDE 2 MG/ML
INJECTION, SOLUTION INTRAMUSCULAR; INTRAVENOUS; SUBCUTANEOUS AS NEEDED
Status: DISCONTINUED | OUTPATIENT
Start: 2018-03-14 | End: 2018-03-14 | Stop reason: HOSPADM

## 2018-03-14 RX ORDER — NALBUPHINE HYDROCHLORIDE 10 MG/ML
5 INJECTION, SOLUTION INTRAMUSCULAR; INTRAVENOUS; SUBCUTANEOUS
Status: DISCONTINUED | OUTPATIENT
Start: 2018-03-14 | End: 2018-03-14 | Stop reason: HOSPADM

## 2018-03-14 RX ORDER — SODIUM CHLORIDE, SODIUM LACTATE, POTASSIUM CHLORIDE, CALCIUM CHLORIDE 600; 310; 30; 20 MG/100ML; MG/100ML; MG/100ML; MG/100ML
125 INJECTION, SOLUTION INTRAVENOUS CONTINUOUS
Status: DISCONTINUED | OUTPATIENT
Start: 2018-03-14 | End: 2018-03-14 | Stop reason: HOSPADM

## 2018-03-14 RX ORDER — VANCOMYCIN HYDROCHLORIDE 1 G/20ML
INJECTION, POWDER, LYOPHILIZED, FOR SOLUTION INTRAVENOUS AS NEEDED
Status: DISCONTINUED | OUTPATIENT
Start: 2018-03-14 | End: 2018-03-14 | Stop reason: HOSPADM

## 2018-03-14 RX ORDER — SODIUM CHLORIDE 0.9 % (FLUSH) 0.9 %
5-10 SYRINGE (ML) INJECTION EVERY 8 HOURS
Status: DISCONTINUED | OUTPATIENT
Start: 2018-03-14 | End: 2018-03-14 | Stop reason: HOSPADM

## 2018-03-14 RX ORDER — NALOXONE HYDROCHLORIDE 0.4 MG/ML
0.4 INJECTION, SOLUTION INTRAMUSCULAR; INTRAVENOUS; SUBCUTANEOUS AS NEEDED
Status: DISCONTINUED | OUTPATIENT
Start: 2018-03-14 | End: 2018-03-15 | Stop reason: HOSPADM

## 2018-03-14 RX ORDER — LIDOCAINE HYDROCHLORIDE 20 MG/ML
INJECTION, SOLUTION EPIDURAL; INFILTRATION; INTRACAUDAL; PERINEURAL AS NEEDED
Status: DISCONTINUED | OUTPATIENT
Start: 2018-03-14 | End: 2018-03-14 | Stop reason: HOSPADM

## 2018-03-14 RX ORDER — GLYCOPYRROLATE 0.2 MG/ML
INJECTION INTRAMUSCULAR; INTRAVENOUS AS NEEDED
Status: DISCONTINUED | OUTPATIENT
Start: 2018-03-14 | End: 2018-03-14 | Stop reason: HOSPADM

## 2018-03-14 RX ORDER — ASPIRIN 325 MG
325 TABLET ORAL DAILY
Status: DISCONTINUED | OUTPATIENT
Start: 2018-03-15 | End: 2018-03-15 | Stop reason: HOSPADM

## 2018-03-14 RX ADMIN — Medication 3.97 MG: at 17:11

## 2018-03-14 RX ADMIN — SODIUM CHLORIDE, SODIUM LACTATE, POTASSIUM CHLORIDE, CALCIUM CHLORIDE: 600; 310; 30; 20 INJECTION, SOLUTION INTRAVENOUS at 13:22

## 2018-03-14 RX ADMIN — Medication 2 G: at 13:29

## 2018-03-14 RX ADMIN — HYDROMORPHONE HYDROCHLORIDE 1 MG: 2 INJECTION, SOLUTION INTRAMUSCULAR; INTRAVENOUS; SUBCUTANEOUS at 14:42

## 2018-03-14 RX ADMIN — LIDOCAINE HYDROCHLORIDE 100 MG: 20 INJECTION, SOLUTION EPIDURAL; INFILTRATION; INTRACAUDAL; PERINEURAL at 13:29

## 2018-03-14 RX ADMIN — PROPOFOL 200 MG: 10 INJECTION, EMULSION INTRAVENOUS at 13:29

## 2018-03-14 RX ADMIN — HYDROMORPHONE HYDROCHLORIDE 1 MG: 2 INJECTION, SOLUTION INTRAMUSCULAR; INTRAVENOUS; SUBCUTANEOUS at 15:25

## 2018-03-14 RX ADMIN — MIDAZOLAM HYDROCHLORIDE 2 MG: 1 INJECTION, SOLUTION INTRAMUSCULAR; INTRAVENOUS at 13:22

## 2018-03-14 RX ADMIN — FAMOTIDINE 20 MG: 20 TABLET, FILM COATED ORAL at 12:55

## 2018-03-14 RX ADMIN — CEFAZOLIN SODIUM 2 G: 2 SOLUTION INTRAVENOUS at 21:24

## 2018-03-14 RX ADMIN — SODIUM CHLORIDE, SODIUM LACTATE, POTASSIUM CHLORIDE, AND CALCIUM CHLORIDE 25 ML/HR: 600; 310; 30; 20 INJECTION, SOLUTION INTRAVENOUS at 12:55

## 2018-03-14 RX ADMIN — SODIUM CHLORIDE, SODIUM LACTATE, POTASSIUM CHLORIDE, CALCIUM CHLORIDE: 600; 310; 30; 20 INJECTION, SOLUTION INTRAVENOUS at 15:15

## 2018-03-14 RX ADMIN — FENTANYL CITRATE 150 MCG: 50 INJECTION, SOLUTION INTRAMUSCULAR; INTRAVENOUS at 13:27

## 2018-03-14 RX ADMIN — GLYCOPYRROLATE 0.4 MG: 0.2 INJECTION INTRAMUSCULAR; INTRAVENOUS at 17:11

## 2018-03-14 RX ADMIN — ROCURONIUM BROMIDE 50 MG: 10 INJECTION, SOLUTION INTRAVENOUS at 13:29

## 2018-03-14 RX ADMIN — HYDROCODONE BITARTRATE AND ACETAMINOPHEN 2 TABLET: 7.5; 325 TABLET ORAL at 21:24

## 2018-03-14 RX ADMIN — Medication 10 ML: at 21:24

## 2018-03-14 RX ADMIN — ONDANSETRON 4 MG: 2 INJECTION INTRAMUSCULAR; INTRAVENOUS at 17:14

## 2018-03-14 NOTE — INTERVAL H&P NOTE
H&P Update:  Suzie [place] was seen and examined. History and physical has been reviewed. The patient has been examined.  There have been no significant clinical changes since the completion of the originally dated History and Physical.    Signed By: Juanis Kebede MD     March 14, 2018 1:15 PM

## 2018-03-14 NOTE — IP AVS SNAPSHOT
Nico Melton 
 
 
 [address] Patient: Anika Silverio MRN: [id number] :1990 About your hospitalization You were admitted on:  2018 You last received care in the:  [facility]  You were discharged on:  March 15, 2018 Why you were hospitalized Your primary diagnosis was:  Not on File Your diagnoses also included:  Fracture Tibia/Fibula, Maisonneuve Fracture Follow-up Information Follow up With Details Comments Contact Info None   None ([phone]) Patient stated that they have no PCP Derian Blanc PA-C On 3/21/2018 Appointment at 1:15 pm [address] [facility] [address] [facility] 
[phone] Your Scheduled Appointments 2018 To Be Determined ROUTINE with Bre Dunbar RN  
[facility] CARE SCHEDULING/INTAKE ([facility]) [address] [place] [address] CARE SCHEDULING/INTAKE ( HOME HEALTH/ HOSPICE) 2018  1:15 PM EDT  
POST OP with Derian Blanc PA-C  
[address] ([facility]) [facility] [address], [address] [facility]  
[phone] Discharge Orders None A check yamilex indicates which time of day the medication should be taken. My Medications CHANGE how you take these medications Instructions Each Dose to Equal  
 Morning Noon Evening Bedtime * HYDROcodone-acetaminophen 7.5-325 mg per tablet Commonly known as:  Clemetine Maria De Jesus What changed:  Another medication with the same name was added. Make sure you understand how and when to take each. Your last dose was: Your next dose is: TAKE ONE TABLET BY MOUTH Q 4 - 6 HRS PRN PAIN **AFTER SURGERY**DO NOT TAKE BEFORE SURGERY  Indications: Pain * HYDROcodone-acetaminophen 7.5-325 mg per tablet Commonly known as:  Wm Carney What changed: You were already taking a medication with the same name, and this prescription was added. Make sure you understand how and when to take each. Your last dose was: Your next dose is: TAKE ONE TO TWO TABLETS BY MOUTH Q 4 - 6 HRS PRN PAIN **AFTER SURGERY**DO NOT TAKE BEFORE SURGERY  Indications: Pain * Notice: This list has 2 medication(s) that are the same as other medications prescribed for you. Read the directions carefully, and ask your doctor or other care provider to review them with you. CONTINUE taking these medications Instructions Each Dose to Equal  
 Morning Noon Evening Bedtime  
 aspirin 325 mg tablet Commonly known as:  ASPIRIN Your last dose was: Your next dose is: Take 1 Tab by mouth daily. 325 mg  
    
   
   
   
  
 polyethylene glycol 17 gram packet Commonly known as:  Madelyn Ronny Your last dose was: Your next dose is: Take 1 Packet by mouth daily. 17 g  
    
   
   
   
  
 promethazine 25 mg tablet Commonly known as:  PHENERGAN Your last dose was: Your next dose is: Take 1 Tab by mouth every six (6) hours as needed for Nausea. 25 mg  
    
   
   
   
  
  
STOP taking these medications   
 ibuprofen 200 mg tablet Commonly known as:  MOTRIN Where to Get Your Medications Information on where to get these meds will be given to you by the nurse or doctor. ! Ask your nurse or doctor about these medications HYDROcodone-acetaminophen 7.5-325 mg per tablet Discharge Instructions DISCHARGE MEDICATIONS:  
  
  
     
Current Discharge Medication List  
   
     
CONTINUE these medications which have NOT CHANGED  
  Details HYDROcodone-acetaminophen (NORCO) 7.5-325 mg per tablet TAKE ONE TABLET BY MOUTH Q 4 - 6 HRS PRN PAIN **AFTER SURGERY**DO NOT TAKE BEFORE SURGERY  Indications: Pain 
Qty: 50 Tab, Refills: 0  
  Associated Diagnoses: Closed trimalleolar fracture of right ankle, initial encounter; Syndesmotic disruption of ankle, initial encounter; Closed fracture of right ankle, initial encounter  
   
polyethylene glycol (MIRALAX) 17 gram packet Take 1 Packet by mouth daily. Qty: 10 Packet, Refills: 1  
   
promethazine (PHENERGAN) 25 mg tablet Take 1 Tab by mouth every six (6) hours as needed for Nausea. Qty: 30 Tab, Refills: 0  
   
aspirin (ASPIRIN) 325 mg tablet Take 1 Tab by mouth daily. Qty: 30 Tab, Refills: 0  
   
   
    
STOP taking these medications  
   
  ibuprofen (MOTRIN) 200 mg tablet Comments:  
Reason for Stopping:   
     
   
  
  
· It is important that you take the medication exactly as they are prescribed. · Keep your medication in the bottles provided by the pharmacist and keep a list of the medication names, dosages, and times to be taken in your wallet. · Do not take other medications without consulting your doctor. · Avoid constipation associated with Pain medication use by taking OTC Colace, Metamucil, Miralax or Milk of Magnesia 
  
DISCHARGE PLAN:  
  
  
The patient will be discharged to Home. NWB right lower extremity. 
  
DIET:  Resume previous diet  
  
NUTRITION: OTC Nutritional supplements, multivitamins, calcium with Vitamin  D 
  
ACTIVITY: No lifting, Driving, or Strenuous exercise and No heavy lifting, pushing, pulling. Weight Bearing/Range of Motion Restrictions: NWB right lower extremity 
  
INCISION CARE: Keep wound clean and dry, reinforce dressing PRN and Ice to area for comfort. Keep the current dressings on and in place. There is no need to change these current dressings. Dressings to please be changed by home nurse or nursing home staff as instructed.   Keep all pets away from  any wound present in order to prevent infection. 
  
 PAIN CONTROL: Prescriptions written: Norco 7.5/325  
  
PRECAUTIONS:  Weight Bearing/Range of Motion Restrictions: NWB. No lifting, twisting, squatting, deep bending. Elevate the right lower extremity on 1 pillow. Place the pillow horizontal so that no pressure is on the back of your heel.  
  
VTE PROPHYLAXIS: with Aspirin 325 mg tab.  
  
ANTIBIOTICS: None at this time 
  
ADDITIONAL INFORMATION:  
  
  
Please call [phone] ([facility] ANSWERING SERVICE) if any: fever > 101.5 , shakes, chills, nausea, vomiting, falling, intractable pain, or for any questions you have regarding your care/medical condition.  
  
If you experience any calf pain or swelling, or are having any shortness of breath, chest pain, or extremity swelling: Please go to closest ER ASAP for assessment to rule out a leg clot. 
  
Please contact your Primary Care Physician for all preoperative medication directions, including the above medications. 
  
  
FOLLOW UP CARE:  
  
You are to call your primary care provider and set up an appointment to see them in 2 weeks.  
  
Follow-up in the office with Dr. Kee Neely. Killian Ferreira or Elise Herrera PA-C in 7 days. Please call [phone] to confirm your appointment. 
  
  
Call with any questions or concerns.  
  
  
[facility]  [email or web address] Announcement We are excited to announce that we are making your provider's discharge notes available to you in [email or web address]. You will see these notes when they are completed and signed by the physician that discharged you from your recent hospital stay. If you have any questions or concerns about any information you see in [email or web address], please call the Health Information Department where you were seen or reach out to your Primary Care Provider for more information about your plan of care. Unresulted Labs-Please follow up with your PCP about these lab tests Order Current Status [place] [facility] TECHNOLOGIST SERVICE In process Providers Seen During Your Hospitalization Provider Specialty Primary office phone Alverda Lanes, [facility] [phone] Immunizations Administered for This Admission Name Date Influenza Vaccine (Quad) PF  Deferred () Your Primary Care Physician (PCP) Primary Care Physician Office Phone Office Fax NONE ** None ** ** None ** You are allergic to the following No active allergies Recent Documentation Height Weight BMI Smoking Status 1.905 m 79.4 kg 21.87 kg/m2 Never Smoker Emergency Contacts Name Discharge Info Relation Home Work Mobile [address] [facility] DISCHARGE CAREGIVER [3] Other Relative [6] [phone] Rolf Baldwin DISCHARGE CAREGIVER [3] Other Relative [6] [phone] Mifflin,Magdalene DISCHARGE CAREGIVER [3] Other Relative [6] [phone] Patient Belongings The following personal items are in your possession at time of discharge: 
  Dental Appliances: None  Visual Aid: None      Home Medications: None   Jewelry: None  Clothing: Shorts, Shirt, Undergarments, Socks, Footwear, Jacket/Coat (one sock and one shoe )    Other Valuables:  (all items to mother ) Please provide this summary of care documentation to your next provider. Signatures-by signing, you are acknowledging that this After Visit Summary has been reviewed with you and you have received a copy. Patient Signature:  ____________________________________________________________ Date:  ____________________________________________________________  
  
Scott Thompson Provider Signature:  ____________________________________________________________ Date:  ____________________________________________________________

## 2018-03-14 NOTE — PERIOP NOTES
Assumed care of patient after report from [address] Patient having pain rated 5. States this is tolerable. Doesn't really want any pain med at present. Has history of HTN. BP elevated. Respirations shallow and patient has to be reminded to take deep breaths.

## 2018-03-14 NOTE — IP AVS SNAPSHOT
[address] Patient: Davis Nuñez MRN: [id number] :1990 A check yamilex indicates which time of day the medication should be taken. My Medications CHANGE how you take these medications Instructions Each Dose to Equal  
 Morning Noon Evening Bedtime * HYDROcodone-acetaminophen 7.5-325 mg per tablet Commonly known as:  [place] What changed:  Another medication with the same name was added. Make sure you understand how and when to take each. Your last dose was: Your next dose is: TAKE ONE TABLET BY MOUTH Q 4 - 6 HRS PRN PAIN **AFTER SURGERY**DO NOT TAKE BEFORE SURGERY  Indications: Pain  
     
   
   
   
  
 * HYDROcodone-acetaminophen 7.5-325 mg per tablet Commonly known as:  [place] Bo[place] What changed: You were already taking a medication with the same name, and this prescription was added. Make sure you understand how and when to take each. Your last dose was: Your next dose is: TAKE ONE TO TWO TABLETS BY MOUTH Q 4 - 6 HRS PRN PAIN **AFTER SURGERY**DO NOT TAKE BEFORE SURGERY  Indications: Pain * Notice: This list has 2 medication(s) that are the same as other medications prescribed for you. Read the directions carefully, and ask your doctor or other care provider to review them with you. CONTINUE taking these medications Instructions Each Dose to Equal  
 Morning Noon Evening Bedtime  
 aspirin 325 mg tablet Commonly known as:  ASPIRIN Your last dose was: Your next dose is: Take 1 Tab by mouth daily. 325 mg  
    
   
   
   
  
 polyethylene glycol 17 gram packet Commonly known as:  Eddie De Pazows Your last dose was: Your next dose is: Take 1 Packet by mouth daily. 17 g  
    
   
   
   
  
 promethazine 25 mg tablet Commonly known as:  PHENERGAN Your last dose was: Your next dose is: Take 1 Tab by mouth every six (6) hours as needed for Nausea. 25 mg  
    
   
   
   
  
  
STOP taking these medications   
 ibuprofen 200 mg tablet Commonly known as:  MOTRIN Where to Get Your Medications Information on where to get these meds will be given to you by the nurse or doctor. ! Ask your nurse or doctor about these medications HYDROcodone-acetaminophen 7.5-325 mg per tablet

## 2018-03-14 NOTE — ANESTHESIA PREPROCEDURE EVALUATION
Anesthetic History   No history of anesthetic complications            Review of Systems / Medical History  Patient summary reviewed and pertinent labs reviewed    Pulmonary  Within defined limits                 Neuro/Psych   Within defined limits           Cardiovascular    Hypertension                   GI/Hepatic/Renal  Within defined limits              Endo/Other  Within defined limits           Other Findings   Comments: Documentation of current medication  Current medications obtained, documented and obtained? YES      Risk Factors for Postoperative nausea/vomiting:       History of postoperative nausea/vomiting? NO       Female? NO       Motion sickness? NO       Intended opioid administration for postoperative analgesia? yes      Smoking Abstinence:  Current Smoker? NO  Elective Surgery? YES  Seen preoperatively by anesthesiologist or proxy prior to day of surgery? YES  Pt abstained from smoking 24 hours prior to anesthesia?  N/A    Preventive care/screening for High Blood Pressure:  Aged 18 years and older: YES  Screened for high blood pressure: YES  Patients with high blood pressure referred to primary care provider   for BP management: YES                 Physical Exam    Airway  Mallampati: II  TM Distance: 4 - 6 cm    Mouth opening: Normal     Cardiovascular    Rhythm: regular  Rate: normal         Dental    Dentition: Poor dentition     Pulmonary  Breath sounds clear to auscultation               Abdominal  GI exam deferred       Other Findings            Anesthetic Plan    ASA: 2  Anesthesia type: general          Induction: Intravenous  Anesthetic plan and risks discussed with: Patient

## 2018-03-14 NOTE — ANESTHESIA POSTPROCEDURE EVALUATION
Post-Anesthesia Evaluation and Assessment    Patient: Argentina Shaffer MRN: [id number]  SSN: [id number]    YOB: 1990  Age: 32 y.o. Sex: male     VS from flow sheet    Cardiovascular Function/Vital Signs  Visit Vitals    /88    Pulse 88    Temp 36.1 °C (96.9 °F)    Resp 16    Ht 6' 3\" (1.905 m)    Wt 79.4 kg (175 lb)    SpO2 97%    BMI 21.87 kg/m2       Patient is status post general anesthesia for Procedure(s):  REMOVAL OF EXTERNAL FIXATOR/OPEN REDUCTION INTERNAL FIXATION RIGHT ANKLE/C-ARM. Nausea/Vomiting: None    Postoperative hydration reviewed and adequate. Pain:  Pain Scale 1: Numeric (0 - 10) (03/14/18 1900)  Pain Intensity 1: 5 (03/14/18 1900)   Managed    Neurological Status:   Neuro (WDL): Within Defined Limits (03/14/18 1245)   At baseline    Mental Status and Level of Consciousness: Arousable    Pulmonary Status:   O2 Device: Nasal cannula (03/14/18 1900)   Adequate oxygenation and airway patent    Complications related to anesthesia: None    Post-anesthesia assessment completed.  No concerns    Signed By: Daniele Snyder MD     March 14, 2018

## 2018-03-14 NOTE — BRIEF OP NOTE
BRIEF OPERATIVE NOTE    Date of Procedure: 3/14/2018   Preoperative Diagnosis: S82.851A RIGHT TRIMALLEOLAR FX, SYNDESMOTIC DISRUPTION, FIBULA FX COMMINUTED  Postoperative Diagnosis:  AS ABOVE    Procedure(s):  1. REMOVAL OF EXTERNAL FIXATOR/OPEN REDUCTION INTERNAL FIXATION RIGHT ANKLE  2. SYNDESMOTIC STABILIZATION  3. CLOSED TREATMENT ANKLE FRACTURE  C-ARM/SYNTHES/NERVE BLOCK  Surgeon(s) and Role:     * Jessie Gordon MD - Primary         Assistant Staff: None    Surgical Staff:  Circ-1: Maria Eugenia Rubalcava RN  Scrub Tech-1: Nick Herrmann  Surg Asst-1: Teresa Richmond SA  No case tracking events are documented in the log. Anesthesia: General   Estimated Blood Loss: 25 ml EBL  IV FLUIDS:  1500 ml IVF  Tourniquet Time:  120 minutes at  300  Mm HG   Specimens: * No specimens in log *   Findings: Trimalleolar Equivalent ankle injury with Syndesmotic Disruption   Complications: none  Implants:     Implant Name Type Inv.  Item Serial No.  Lot No. LRB No. Used Action   PLATE T TIB DSTL 4H 7.0YQ STRL --  - [id number]  PLATE T TIB DSTL 4H 2.6NU STRL --   SYNTHES [place] NA N/A 1 Implanted   SCR BNE CRTX ST T8 2.7X42MM SS --  - [id number]  SCR BNE CRTX ST T8 2.7X42MM SS --   SYNTHES [place] NA N/A 1 Implanted   SCR ST METAPHYSEAL 2.7X48MM -- W/T8 STRDRV RECESS - [id number]  SCR ST METAPHYSEAL 2.7X48MM -- W/T8 STRDRV RECESS  SYNTHES [place] NA N/A 1 Implanted   SCR VA LCK STRDRV T8 2.7X48MM --  - [id number]  SCR VA LCK STRDRV T8 2.7X48MM --   SYNTHES [place] NA N/A 1 Implanted   SCR VA LCK STRDRV T8 2.7X46MM --  - [id number]  SCR VA LCK STRDRV T8 2.7X46MM --   SYNTHES [place] NA N/A 1 Implanted   SCR BNE CRTX ST T8 2.7X34MM SS --  - [id number]  SCR BNE CRTX ST T8 2.7X34MM SS --   SYNTHES [place] NA N/A 1 Implanted   SCR BNE CRTX ST HEX 3.5X45MM -- SS - [id number]  SCR BNE CRTX ST HEX 3.5X45MM -- SS  SYNTHES [place] NA N/A 1 Implanted   SCR BNE CRTX ST HEX 3.5X55MM -- SS - [id number]   SCR BNE CRTX ST HEX 3.5X55MM -- SS   SYNTHES [place] NA N/A 1 Implanted
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
## 2018-03-15 VITALS
SYSTOLIC BLOOD PRESSURE: 128 MMHG | HEIGHT: 75 IN | HEART RATE: 64 BPM | OXYGEN SATURATION: 98 % | WEIGHT: 175 LBS | TEMPERATURE: 98.2 F | DIASTOLIC BLOOD PRESSURE: 78 MMHG | BODY MASS INDEX: 21.76 KG/M2 | RESPIRATION RATE: 18 BRPM

## 2018-03-15 PROCEDURE — 99218 HC RM OBSERVATION: CPT

## 2018-03-15 PROCEDURE — 97165 OT EVAL LOW COMPLEX 30 MIN: CPT

## 2018-03-15 PROCEDURE — 97161 PT EVAL LOW COMPLEX 20 MIN: CPT

## 2018-03-15 PROCEDURE — 74011250637 HC RX REV CODE- 250/637: Performed by: ORTHOPAEDIC SURGERY

## 2018-03-15 PROCEDURE — 74011250636 HC RX REV CODE- 250/636: Performed by: ORTHOPAEDIC SURGERY

## 2018-03-15 RX ORDER — HYDROCODONE BITARTRATE AND ACETAMINOPHEN 7.5; 325 MG/1; MG/1
TABLET ORAL
Qty: 50 TAB | Refills: 0 | Status: SHIPPED | OUTPATIENT
Start: 2018-03-15 | End: 2018-03-26 | Stop reason: SDUPTHER

## 2018-03-15 RX ADMIN — Medication 10 ML: at 06:14

## 2018-03-15 RX ADMIN — ASPIRIN 325 MG ORAL TABLET 325 MG: 325 PILL ORAL at 08:38

## 2018-03-15 RX ADMIN — HYDROCODONE BITARTRATE AND ACETAMINOPHEN 2 TABLET: 7.5; 325 TABLET ORAL at 08:38

## 2018-03-15 RX ADMIN — CEFAZOLIN SODIUM 2 G: 2 SOLUTION INTRAVENOUS at 06:14

## 2018-03-15 NOTE — PROGRESS NOTES
Bedside and Verbal shift change report given to Kathleen Miranda RN (oncoming nurse) by Anthony Oh RN (offgoing nurse). Report included the following information SBAR, Kardex, MAR and Recent Results.     SITUATION:    Code Status: Full Code   Reason for Admission: S82.851A RIGHT TRIMALLEOLAR FX   Fracture tibia/fibula   Maisonneuve fracture    [facility] day: 0   Problem List:       Hospital Problems  Date Reviewed: 3/14/2018          Codes Class Noted POA    Fracture tibia/fibula ICD-10-CM: S82.209A, S82.409A  ICD-9-CM: 823.82  3/14/2018 Unknown        Maisonneuve fracture ICD-10-CM: I37.371Y  ICD-9-CM: 823.01  3/14/2018 Unknown              BACKGROUND:    Past Medical History:   Past Medical History:   Diagnosis Date    Hypertension     pt denies this    Seasonal allergic rhinitis          Patient taking anticoagulants yes     ASSESSMENT:    Changes in Assessment Throughout Shift: none     Patient has Central Line: no Reasons if yes:      Patient has Montes Cath: no Reasons if yes:          Last Vitals:     Vitals:    03/14/18 1900 03/14/18 2107 03/14/18 2340 03/15/18 0507   BP: 158/88 149/84 151/85 130/71   Pulse: 88 100 95 85   Resp: 16 16 16 16   Temp:  98.1 °F (36.7 °C) 97.8 °F (36.6 °C) 97.2 °F (36.2 °C)   SpO2: 97% 97% 97% 95%   Weight:       Height:            IV and DRAINS (will only show if present)   Peripheral IV 03/14/18 Left Hand-Site Assessment: Clean, dry, & intact     WOUND (if present)   Wound Type:  surgical   Dressing present  yes   Wound Concerns/Notes:  none     PAIN    Pain Assessment    Pain Intensity 1: 2 (03/15/18 0507)    Pain Location 1: Ankle    Pain Intervention(s) 1: Ice, Repositioned    Patient Stated Pain Goal: 5  o Interventions for Pain:  medication  o Intervention effective: yes  o Time of last intervention: see mar   o Reassessment Completed: yes      Last 3 Weights:  Last 3 Recorded Weights in this Encounter    03/09/18 1616   Weight: 79.4 kg (175 lb)     Weight change:      INTAKE/OUPUT    Current Shift: 03/14 1901 - 03/15 0700  In: 340 [P.O.:340]  Out: 2175 [Urine:2175]    Last three shifts: 03/13 0701 - 03/14 1900  In: 1500 [I.V.:1500]  Out: -      LAB RESULTS     Recent Labs      03/12/18   1132   WBC  6.9   HGB  13.3   HCT  40.9   PLT  353        Recent Labs      03/12/18   1132   NA  140   K  4.6   GLU  83   BUN  11   CREA  1.07   CA  9.8       RECOMMENDATIONS AND DISCHARGE PLANNING     1. Pending tests/procedures/ Plan of Care or Other Needs: pain management, PT/OT     2. Discharge plan for patient and Needs/Barriers:     3. Estimated Discharge Date: 3/16/18 Posted on Whiteboard in [facility] Room: yes      4. The patient's care plan was reviewed with the oncoming nurse. \"HEALS\" SAFETY CHECK      Fall Risk    Total Score: 3    Safety Measures: Safety Measures: Bed/Chair-Wheels locked, Bed in low position, Call light within reach, Fall prevention (comment), Gripper socks    A safety check occurred in the patient's room between off going nurse and oncoming nurse listed above. The safety check included the below items  Area Items   H  High Alert Medications - Verify all high alert medication drips (heparin, PCA, etc.)   E  Equipment - Suction is set up for ALL patients (with kellyker)  - Red plugs utilized for all equipment (IV pumps, etc.)  - WOWs wiped down at end of shift.  - Room stocked with oxygen, suction, and other unit-specific supplies   A  Alarms - Bed alarm is set for fall risk patients  - Ensure chair alarm is in place and activated if patient is up in a chair   L  Lines - Check IV for any infiltration  - Montes bag is empty if patient has a Montes   - Tubing and IV bags are labeled   S  Safety   - Room is clean, patient is clean, and equipment is clean. - Hallways are clear from equipment besides carts.    - Fall bracelet on for fall risk patients  - Ensure room is clear and free of clutter  - Suction is set up for ALL patients (with martina)  - Hallways are clear from equipment besides carts.    - Isolation precautions followed, supplies available outside room, sign posted     Kain Bartlett RN

## 2018-03-15 NOTE — PROGRESS NOTES
Problem: Falls - Risk of  Goal: *Absence of Falls  Document Josephine Fall Risk and appropriate interventions in the flowsheet.   Outcome: Progressing Towards Goal  Fall Risk Interventions:  Mobility Interventions: Patient to call before getting OOB         Medication Interventions: Patient to call before getting OOB, Teach patient to arise slowly    Elimination Interventions: Call light in reach, Patient to call for help with toileting needs    History of Falls Interventions: Door open when patient unattended
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
## 2018-03-15 NOTE — PROGRESS NOTES
Patient alert and oriented. Vitals WNL. Neuro intact. Patient able to wiggle toes and has sensation. Cast is dry, clean and intact. Ice applied.

## 2018-03-15 NOTE — PROGRESS NOTES
PT evaluation completed. Patient cleared for discharge home with crutches at this time. Full note to follow.     Thank you, Lori Jarrell PT, DPT

## 2018-03-15 NOTE — PROGRESS NOTES
2107  Received pt in stable condition,   General: lying in bed in supine, not apparent distress  Neuro: AOx4, denies numbness, 0 tingling, able to wiggle toes to bilateral lower extremities  Cardio:  denies chest pain  Respiratory: denies shortness of breath  Skin: cast to RLE clean, dry and intact  : denies nausea and vomiting; eating  Mus: Up ad lake  Bed in low position, oriented to room and use of call bell. Will monitor. 2238  Alert, NAD, stable  GI: voiding, clear, yellow urine, no odor    0507  AOx4, NAD, stable, no changes in condition.

## 2018-03-15 NOTE — PROGRESS NOTES
Rounded on patient post surgery   Activity: Reinforced importance of getting OOB for all meals, going to bathroom to help prevent blood clots using crutches per PT education. VTE prophylaxis: Instructed patient to use their SCD when not up and walking. To use while in bed and in the chair. Educated re: ankle pumps to assist with circulation when in hospital and at home. Medications: Reviewed pain medications patient is taking and the importance of keeping pain under control to help with getting OOB and therapy. Reminded the patient to always eat a snack with their pain medication to help to prevent nausea. Encouraged patient to monitor for constipation and to take a stool softner/laxative while recovering and on pain medication. Incentive Spirometry: Reinforced use of incentive spirometer with return demonstration by patient. Wound Care: Ace wrap dry and intact. Foot elevated on a pillow. Patient instructed not to take dressing off at home. Patient given CHG wash to use in hospital and at home. Stressed importance of using a clean towel and washcloth daily. Reminded to put on clean clothes and night clothes daily. Ice Protocol: Ice pack in place per protocol. Patient Safety: Call light in reach. Patient  reminded to call for help toget OOB or when leaving bathroom for safety. Phone and other items also within reach per patient's request.     Diet: Educated patient on the importance of eating three well balanced meals a day with protein to promote bone/muscle healing. Reminded patient to drink lots of fluids to protect kidneys from all the medications being taken currently with recovery. Patient given educational material to remind them to continue doing everything at home to prevent complications and have a successful recovery. Patient  verbalized understand of all information and education discussed. Patient  given the opportunity for asking questions.

## 2018-03-15 NOTE — DISCHARGE INSTRUCTIONS
DISCHARGE MEDICATIONS:               Current Discharge Medication List             CONTINUE these medications which have NOT CHANGED     Details   HYDROcodone-acetaminophen (NORCO) 7.5-325 mg per tablet TAKE ONE TABLET BY MOUTH Q 4 - 6 HRS PRN PAIN **AFTER SURGERY**DO NOT TAKE BEFORE SURGERY  Indications: Pain  Qty: 50 Tab, Refills: 0     Associated Diagnoses: Closed trimalleolar fracture of right ankle, initial encounter; Syndesmotic disruption of ankle, initial encounter; Closed fracture of right ankle, initial encounter       polyethylene glycol (MIRALAX) 17 gram packet Take 1 Packet by mouth daily. Qty: 10 Packet, Refills: 1       promethazine (PHENERGAN) 25 mg tablet Take 1 Tab by mouth every six (6) hours as needed for Nausea. Qty: 30 Tab, Refills: 0       aspirin (ASPIRIN) 325 mg tablet Take 1 Tab by mouth daily. Qty: 30 Tab, Refills: 0                STOP taking these medications         ibuprofen (MOTRIN) 200 mg tablet Comments:   Reason for Stopping:                    · It is important that you take the medication exactly as they are prescribed. · Keep your medication in the bottles provided by the pharmacist and keep a list of the medication names, dosages, and times to be taken in your wallet. · Do not take other medications without consulting your doctor. · Avoid constipation associated with Pain medication use by taking OTC Colace, Metamucil, Miralax or Milk of Magnesia     DISCHARGE PLAN:         The patient will be discharged to Home. NWB right lower extremity.     DIET:  Resume previous diet      NUTRITION: OTC Nutritional supplements, multivitamins, calcium with Vitamin  D     ACTIVITY: No lifting, Driving, or Strenuous exercise and No heavy lifting, pushing, pulling. Weight Bearing/Range of Motion Restrictions: NWB right lower extremity     INCISION CARE: Keep wound clean and dry, reinforce dressing PRN and Ice to area for comfort. Keep the current dressings on and in place.  There is no need to change these current dressings. Dressings to please be changed by home nurse or nursing home staff as instructed. Keep all pets away from  any wound present in order to prevent infection.     PAIN CONTROL: Prescriptions written: Norco 7.5/325      PRECAUTIONS:  Weight Bearing/Range of Motion Restrictions: NWB. No lifting, twisting, squatting, deep bending. Elevate the right lower extremity on 1 pillow. Place the pillow horizontal so that no pressure is on the back of your heel.      VTE PROPHYLAXIS: with Aspirin 325 mg tab.      ANTIBIOTICS: None at this time     ADDITIONAL INFORMATION:         Please call [phone] ([facility] ANSWERING SERVICE) if any: fever > 101.5 , shakes, chills, nausea, vomiting, falling, intractable pain, or for any questions you have regarding your care/medical condition.      If you experience any calf pain or swelling, or are having any shortness of breath, chest pain, or extremity swelling: Please go to closest ER ASAP for assessment to rule out a leg clot.     Please contact your Primary Care Physician for all preoperative medication directions, including the above medications.        FOLLOW UP CARE:      You are to call your primary care provider and set up an appointment to see them in 2 weeks.      Follow-up in the office with  [facility]. Lara Dinh or Madiha Bennett PA-C in 7 days. Please call [phone] to confirm your appointment.        Call with any questions or concerns.         [facility]  [phone]

## 2018-03-15 NOTE — PROGRESS NOTES
TRANSFER - IN REPORT:    Verbal report received from [address], RN(name) on Michelle Marie  being received from [employer]) for routine post - op      Report consisted of patients Situation, Background, Assessment and   Recommendations(SBAR). Information from the following report(s) SBAR, Kardex, OR Summary and MAR was reviewed with the receiving nurse. Opportunity for questions and clarification was provided. Assessment completed upon patients arrival to unit and care assumed.

## 2018-03-15 NOTE — PROGRESS NOTES
Problem: Mobility Impaired (Adult and Pediatric)  Goal: *Acute Goals and Plan of Care (Insert Text)  Acute goals not established. Patient reports/demonstrates independent functional mobility, acute skilled PT services not indicated at this time. physical Therapy EVALUATION and Discharge    Patient: Richard Cuba (80 y.o. male)  Date: 3/15/2018  Primary Diagnosis: S82.851A RIGHT TRIMALLEOLAR FX  Fracture tibia/fibula  Maisonneuve fracture  Procedure(s) (LRB):  REMOVAL OF EXTERNAL FIXATOR/OPEN REDUCTION INTERNAL FIXATION RIGHT ANKLE/C-ARM (Right) 1 Day Post-Op   Precautions: Fall, NWB (RLE)    ASSESSMENT AND RECOMMENDATIONS:  Based on the objective data described below, the patient presents with independent functional mobility s/p R ankle ORIF. Patient was NWB on RLE for past month with external fixator, reports being independent with use of axillary crutches. He transferred to standing independently using crutches, ambulated ~70 ft in hallway at good pace without loss of balance, maintained NWB status without need for cuing or assistance. Patient returned to bed, was left with RLE elevated on pillow, call bell and crutches within reach, and needs met. Patient cleared for discharge home from functional mobility standpoint. Skilled physical therapy is not indicated at this time. Discharge Recommendations: None  Further Equipment Recommendations for Discharge: N/A - patient has crutches     SUBJECTIVE:   Patient stated I can do everything myself.     OBJECTIVE DATA SUMMARY:     Past Medical History:   Diagnosis Date    Hypertension     pt denies this    Seasonal allergic rhinitis      Past Surgical History:   Procedure Laterality Date    HX ORTHOPAEDIC  02/27/2018    External Fixation right ankle     Barriers to Learning/Limitations: None  Compensate with: N/A  Prior Level of Function/Home Situation: Patient lives with moreno in single story home with ramp to enter.  He has been using crutches with NWB RLE for past month, has been modified independent with all functional mobility. Home Situation  Home Environment: Private residence  # Steps to Enter: 0 (ramp)  Wheelchair Ramp: Yes  One/Two Story Residence: One story  Living Alone: No  Support Systems: Spouse/Significant Other/Partner  Patient Expects to be Discharged to[de-identified] Private residence  Current DME Used/Available at Home: Crutches  Tub or Shower Type:  (patient sponge bathes)  Critical Behavior:  Neurologic State: Alert  Psychosocial  Patient Behaviors: Calm; Cooperative  Strength:    Strength: Within functional limits (BLE)  Tone & Sensation:   Tone: Normal (BLE)  Sensation: Intact (BLE)   Range Of Motion:  AROM: Within functional limits (BLE with R short leg cast)  Functional Mobility:  Bed Mobility:  Supine to Sit: Independent  Sit to Supine: Independent  Scooting: Independent  Transfers:  Sit to Stand: Modified independent  Stand to Sit: Modified independent  Balance:   Sitting: Intact  Standing: Intact; With support (crutches)  Ambulation/Gait Training:  Distance (ft): 70 Feet (ft)  Assistive Device: Crutches  Ambulation - Level of Assistance: Modified independent  Pain:  Pre session: 1-2/10 R ankle  Post session: 0/10  Activity Tolerance:   Good  Please refer to the flowsheet for vital signs taken during this treatment. After treatment:   [] Patient left in no apparent distress sitting up in chair  [] Patient left sitting on EOB  [x] Patient left in no apparent distress in bed  [] Patient declined to be OOB at this time due to  [x] Call bell left within reach  [x] Nursing notified([place])  [] Caregiver present  [] Bed alarm activated  [] SCDs in place  COMMUNICATION/EDUCATION:   [x]         Fall prevention education was provided and the patient/caregiver indicated understanding. [x]         Patient/family have participated as able in goal setting and plan of care. [x]         Patient/family agree to work toward stated goals and plan of care.   [] Patient understands intent and goals of therapy, but is neutral about his/her participation. []         Patient is unable to participate in goal setting and plan of care. Thank you for this referral.  Rebekah Donna   Time Calculation: 10 mins    Mobility  Current  CI= 1-19%  D/C  CI= 1-19%. The severity rating is based on the Level of Assistance required for Functional Mobility and ADLs.     Eval Complexity: History: MEDIUM  Complexity : 1-2 comorbidities / personal factors will impact the outcome/ POC Exam:MEDIUM Complexity : 3 Standardized tests and measures addressing body structure, function, activity limitation and / or participation in recreation  Presentation: LOW Complexity : Stable, uncomplicated  Clinical Decision Making:Low Complexity   Overall Complexity:MEDIUM

## 2018-03-15 NOTE — PROGRESS NOTES
PROGRESS NOTE           POD # 1    ASSESSMENT:     S/p:     PROCEDURES PERFORMED:   1. Removal of temporizing trauma external Synthes fixator system to the right lower extremity. 2.  Open reduction internal fixation, right posterior tibia fracture with a Synthes posterior tibial plate. 3.  Stabilization of the syndesmosis with 2 Synthes 3.5 mm screws. 4.  Closed treatment of the fibular fracture. PLAN:       1)  Pain management - per pain protocol   2)  Antibiotics:  Continue ABX as currently ordered - Ancef 2 grams IV Q 8 times 3 doses  3)  Incentive Spirometry  4)  Acute blood loss anemia/Post -op anemia: none  5)  DVT prophylaxis:   mg tab. foot /ankle pumps, [place], encourage mobilization  6)  WB STATUS: NWB RLE   8) Incision Care: Routine Incision Care and Dressing Changes as necessary per Dr. Misael Smith protocol  9) D/C planning:  Intervention : Home    Plan to DC today  with Below instructions:    1) Follow up with Dr. Lorenzo Pedersen. Ian Morrison in 7 days. 2) No weight bearing to the right lower extremity   3) Elevate the right lower extremity on 1 pillow. Place the pillow horizontal so that no pressure is on the back of your heel. 4) Keep your dressings clean and dry to the right  lower extremity and leave your current dressings on and in place. 5) Call [phone] to confirm your appointment. 6) Start taking your pain medications when you get home   8) Please call [phone] ([address]) if any: fever, shakes, chills, intractable pain, or for any questions you have regarding your care/medical condition. 9) If you experience any calf pain or swelling, or are having any shortness of breath, chest pain, or extremity swelling: Please go to closest ER ASAP for assessment to rule out a blood clot. SUBJECTIVE:       Patient seen and evaluated. Doing well. No complaints resting comfortable, NAD.   Denies: CP, SOB, ABD PAIN, Calf pain    OBJECTIVE:     Visit Vitals    /75    Pulse 78    Temp 98.7 °F (37.1 °C)    Resp 18    Ht 6' 3\" (1.905 m)    Wt 175 lb (79.4 kg)    SpO2 98%    BMI 21.87 kg/m2       speech normal in context and clarity  memory intact grossly  Alert, Oriented x 3 in bed,  in place, cap refill < 2 seconds. CHEST/ABDOMEN: Observation reveals: No audible wheezing from mouth. No accessory use of chest muscles during breathing. Non tender abdomen    Examination of RLE reveals wound covered. Incision/Dressings:  Dressing Clean, dry and  Intact. Posterior splint with the ankle in neutral. Polar care noted. Extremities:        No significant edema or embolic phenomena to the foot/toes or hands         Sensory grossly intact              Motor intact         NV intact         Lower extremities are warm and appear well perfused          Neg calf tenderness nor evidence DVT    Labs:    CBC  Lab Results   Component Value Date/Time    WBC 6.9 03/12/2018 11:32 AM    RBC 4.83 03/12/2018 11:32 AM    HCT 40.9 03/12/2018 11:32 AM    MCV 84.7 03/12/2018 11:32 AM    MCH 27.5 03/12/2018 11:32 AM    MCHC 32.5 03/12/2018 11:32 AM    RDW 13.5 03/12/2018 11:32 AM         Coagulation  No results found for: INR, APTT         Basic Metabolic Profile  Lab Results   Component Value Date     03/12/2018    CO2 31 03/12/2018    BUN 11 03/12/2018       No results found for this or any previous visit (from the past 24 hour(s)).   Temp Readings from Last 3 Encounters:   03/15/18 98.7 °F (37.1 °C)   03/12/18 97.7 °F (36.5 °C)   03/13/18 98.2 °F (36.8 °C) (Temporal)         All Micro Results     None              Yeison Kwon PA-C

## 2018-03-15 NOTE — PROGRESS NOTES
Problem: Self Care Deficits Care Plan (Adult)  Goal: *Acute Goals and Plan of Care (Insert Text)  Outcome: Resolved/Met Date Met: 03/15/18  Occupational Therapy EVALUATION/discharge    Patient: Michelle Marie (89 y.o. male)  Date: 3/15/2018  Primary Diagnosis: S82.851A RIGHT TRIMALLEOLAR FX  Fracture tibia/fibula  Maisonneuve fracture  Procedure(s) (LRB):  REMOVAL OF EXTERNAL FIXATOR/OPEN REDUCTION INTERNAL FIXATION RIGHT ANKLE/C-ARM (Right) 1 Day Post-Op   Precautions:  Fall, NWB (RLE)    ASSESSMENT AND RECOMMENDATIONS:  Based on the objective data described below, the patient is modified independent with functional mobility and self care tasks wit crutches and adherence to NWB status. Patient has WFL AROM and strength of BUEs. Patient is able to simulate LE self care tasks sitting on EOB modified independently. Patient was modified independent with simulated toilet transfer with crutches. Patient left comfortable in bed. Skilled acute care occupational therapy is not indicated at this time. Discharge Recommendations: None  Further Equipment Recommendations for Discharge: N/A      Barriers to Learning/Limitations: None    COMPLEXITY     Eval Complexity: History: LOW Complexity : Brief history review ; Examination: LOW Complexity : 1-3 performance deficits relating to physical, cognitive , or psychosocial skils that result in activity limitations and / or participation restrictions ; Decision Making:LOW Complexity : No comorbidities that affect functional and no verbal or physical assistance needed to complete eval tasks  Assessment: Low Complexity      G-CODES:     Self Care  Current  CI= 1-19%   Goal  CI= 1-19%   D/C  CI= 1-19%. The severity rating is based on the Level of Assistance required for Functional Mobility and ADLs. SUBJECTIVE:   Patient stated Not really in any pain.     OBJECTIVE DATA SUMMARY:     Past Medical History:   Diagnosis Date    Hypertension     pt denies this    Seasonal allergic rhinitis      Past Surgical History:   Procedure Laterality Date    HX ORTHOPAEDIC  02/27/2018    External Fixation right ankle     Prior Level of Function/Home Situation: Patient reported he was modified independent in basic self care tasks and functional mobility PTA, with crutches  Home Situation  Home Environment: Private residence  # Steps to Enter: 0 (ramp)  One/Two Story Residence: One story  Living Alone: No  Support Systems: Spouse/Significant Other/Partner  Patient Expects to be Discharged to[de-identified] Private residence  Current DME Used/Available at Home: Crutches  Tub or Shower Type:  (patient sponge bathes at sink)  [x]     Right hand dominant   []     Left hand dominant  Cognitive/Behavioral Status:  Neurologic State: Alert  Orientation Level: Oriented X4  Cognition: Follows commands    Skin: No skin changes noted    Edema: No edema noted    Vision/Perceptual:       Acuity: Within Defined Limits      Coordination:  Coordination: Within functional limits (BUEs)       Balance:  Sitting: Intact  Standing: Intact; With support (crutches )    Strength:  Strength: Within functional limits (BUEs)      Tone & Sensation:  Tone: Normal (BUEs)  Sensation: Intact (BUEs)     Range of Motion:  AROM: Within functional limits (BUEs)     Functional Mobility and Transfers for ADLs:  Bed Mobility:  Supine to Sit: Independent  Sit to Supine: Independent  Scooting: Independent  Transfers:  Sit to Stand: Modified independent    Toilet Transfer : Modified independent (simulated with crutches)      ADL Assessment:(clinical judgement)  Feeding: Independent    Oral Facial Hygiene/Grooming: Modified Independent    Bathing: Modified independent    Upper Body Dressing: Independent    Lower Body Dressing: Modified independent    Toileting: Modified independent      Pain:  Pt reports 1/10 pain or discomfort prior to treatment.    Pt reports 1/10 pain or discomfort post treatment.      Activity Tolerance:   Good    Please refer to the flowsheet for vital signs taken during this treatment. After treatment:   []  Patient left in no apparent distress sitting up in chair  [x]  Patient left in no apparent distress in bed  [x]  Call bell left within reach  [x]  Nursing notified  []  Caregiver present  []  Bed alarm activated    COMMUNICATION/EDUCATION:   Communication/Collaboration:  []      Home safety education was provided and the patient/caregiver indicated understanding. [x]      Patient/family have participated as able and agree with findings and recommendations. []      Patient is unable to participate in plan of care at this time.     Celena Reese, OTR/L  Time Calculation: 10 mins

## 2018-03-15 NOTE — DISCHARGE SUMMARY
DISCHARGE SUMMARY         Patient: Jason Angulo               Sex: male          MRN: [id number]         YOB: 1990      Age:  32 y.o.          LOS: 0 days                    ADMIT DATE:     3/14/2018    DISCHARGE DATE:     3/15/2018     ADMISSION DIAGNOSIS:     S82.851A RIGHT TRIMALLEOLAR FX  Fracture tibia/fibula  Maisonneuve fracture    DISCHARGE ORTHOPAEDIC DIAGNOSIS:     S82.851A RIGHT TRIMALLEOLAR FX  Fracture tibia/fibula  Maisonneuve fracture    DISCHARGE CONDITION:     GOOD    Afebrile  Ambulating  Eating, Drinking, Voiding  Stable    DISCHARGE DESTINATION:     Home      HPI:     Patient is a 32 y.o. male that sustained a Maisonneuve right ankle injury. He has a comminuted fibula fracture with overall lengthening still fairly well preserved. He has syndesmotic disruption. He has a medial malleolar deltoid or medial malleolar soft tissue malalignment, as well as a posterior tibia fracture. He was placed in an external fixator on 2/27/18 as he had a very large fracture blister on the medial side of his soft tissues making his soft tissues nonamenable for any incisional surgical intervention at that time. He subsequently required removal of the external fixator and an open reduction internal fixation right ankle. Due to the current findings and affected activity of daily living surgical intervention is indicated. The alternatives, risks, complications as well as expected outcome were discussed, the patient understands and wishes to proceed with surgery. HOSPITAL COURSE:     Patient had removal of the external fixator and an open reduction internal fixation right ankle. POST-OP COURSE:     The patient tolerated the procedure well. Patient was place on antibiotic both pre and post-op for prophylaxis against infection as well as ASA post-op for prophylaxis against DVT. Vitals signs closely monitored and stable at discharge. The patient remained afebrile.   Patient is in no acute distress and denies any N/V/D/C, no CP/SOB, no UR/GI/ symptoms. The wound was CDI. Patient had negative calf tenderness or swelling, no evidence for DVT. Patient had PT/OT consult for evaluation and treatment. T    Physical Exam on Discharge:     Vital signs at discharge:   Visit Vitals    /75    Pulse 78    Temp 98.7 °F (37.1 °C)    Resp 18    Ht 6' 3\" (1.905 m)    Wt 175 lb (79.4 kg)    SpO2 98%    BMI 21.87 kg/m2         Intake/Output Summary (Last 24 hours) at 03/15/18 0948  Last data filed at 03/15/18 0837   Gross per 24 hour   Intake             1840 ml   Output             3100 ml   Net            -1260 ml       speech normal in context and clarity  memory intact grossly  Alert, Oriented x 3 sitting up in bed,  cap refill < 2 seconds. CHEST/ABDOMEN: Observation reveals: No audible wheezing from mouth. No accessory use of chest muscles during breathing. Non tender abdomen    Examination of RLE reveals wound covered. Slight TTP    Incision/Dressings:  Dressing Clean, dry and  Intact. Multi Podus splint with the ankle in neutral.    Extremities:        No significant edema or embolic phenomena to the foot/toes or hands         Sensory grossly intact              Motor intact         NV intact         Lower extremities are warm and appear well perfused          Neg calf tenderness nor evidence DVT      CONSULTS:     None    SIGNIFICANT DIAGNOSTIC STUDIES:       Labs:    Basic Metabolic Profile   Recent Labs      03/12/18   1132   NA  140   CO2  31   BUN  11        Hepatic Function   No results for input(s): ALBUMIN in the last 72 hours.     No lab exists for component: TOTPR, BILID, BILIT, SGPTALT, SGOTAST, ALKPHOS, AMYLASE, LIPASE      CBC w/Diff    Recent Labs      03/12/18   1132   WBC  6.9   RBC  4.83   HCT  40.9   MCV  84.7   MCH  27.5   MCHC  32.5   RDW  13.5    Recent Labs      03/12/18   1132   MONOS  6   EOS  1   BASOS  0   RDW  13.5         Coagulation   No results for input(s): INR, APTT in the last 72 hours. No lab exists for component: PT, INREXT, INREXT          CHRONIC MEDICAL DIAGNOSES:     Problem List as of 3/15/2018  Date Reviewed: 3/14/2018          Codes Class Noted - Resolved    Fracture tibia/fibula ICD-10-CM: S82.209A, S82.409A  ICD-9-CM: 823.82  3/14/2018 - Present        Maisonneuve fracture ICD-10-CM: X53.843Q  ICD-9-CM: 823.01  3/14/2018 - Present        Fracture of ankle, trimalleolar, closed, right, initial encounter ICD-10-CM: S82.851A  ICD-9-CM: 824.6  2/27/2018 - Present        Displaced Maisonneuve fracture of right lower extremity ICD-10-CM: S82.861A  ICD-9-CM: 823.01  2/27/2018 - Present        Blister of leg ICD-10-CM: N27.478V  ICD-9-CM: 916.2  2/27/2018 - Present        Ankle fracture ICD-10-CM: S82.899A  ICD-9-CM: 824.8  2/27/2018 - Present                DISCHARGE MEDICATIONS:       Current Discharge Medication List      CONTINUE these medications which have NOT CHANGED    Details   HYDROcodone-acetaminophen (NORCO) 7.5-325 mg per tablet TAKE ONE TABLET BY MOUTH Q 4 - 6 HRS PRN PAIN **AFTER SURGERY**DO NOT TAKE BEFORE SURGERY  Indications: Pain  Qty: 50 Tab, Refills: 0    Associated Diagnoses: Closed trimalleolar fracture of right ankle, initial encounter; Syndesmotic disruption of ankle, initial encounter; Closed fracture of right ankle, initial encounter      polyethylene glycol (MIRALAX) 17 gram packet Take 1 Packet by mouth daily. Qty: 10 Packet, Refills: 1      promethazine (PHENERGAN) 25 mg tablet Take 1 Tab by mouth every six (6) hours as needed for Nausea. Qty: 30 Tab, Refills: 0      aspirin (ASPIRIN) 325 mg tablet Take 1 Tab by mouth daily. Qty: 30 Tab, Refills: 0         STOP taking these medications       ibuprofen (MOTRIN) 200 mg tablet Comments:   Reason for Stopping:               · It is important that you take the medication exactly as they are prescribed.    · Keep your medication in the bottles provided by the pharmacist and keep a list of the medication names, dosages, and times to be taken in your wallet. · Do not take other medications without consulting your doctor. · Avoid constipation associated with Pain medication use by taking OTC Colace, Metamucil, Miralax or Milk of Magnesia    DISCHARGE PLAN:       The patient will be discharged to Home. NWB right lower extremity. DIET:  Resume previous diet     NUTRITION: OTC Nutritional supplements, multivitamins, calcium with Vitamin  D    ACTIVITY: No lifting, Driving, or Strenuous exercise and No heavy lifting, pushing, pulling. Weight Bearing/Range of Motion Restrictions: NWB right lower extremity    INCISION CARE: Keep wound clean and dry, reinforce dressing PRN and Ice to area for comfort. Keep the current dressings on and in place. There is no need to change these current dressings. Dressings to please be changed by home nurse or nursing home staff as instructed. Keep all pets away from  any wound present in order to prevent infection. PAIN CONTROL: Prescriptions written: Norco 7.5/325     PRECAUTIONS:  Weight Bearing/Range of Motion Restrictions: NWB. No lifting, twisting, squatting, deep bending. Elevate the right lower extremity on 1 pillow. Place the pillow horizontal so that no pressure is on the back of your heel. VTE PROPHYLAXIS: with Aspirin 325 mg tab. ANTIBIOTICS: None at this time    ADDITIONAL INFORMATION:       Please call [phone] ([address]) if any: fever > 101.5 , shakes, chills, nausea, vomiting, falling, intractable pain, or for any questions you have regarding your care/medical condition. If you experience any calf pain or swelling, or are having any shortness of breath, chest pain, or extremity swelling: Please go to closest ER ASAP for assessment to rule out a leg clot. Please contact your Primary Care Physician for all preoperative medication directions, including the above medications.       FOLLOW UP CARE:     You are to call your primary care provider and set up an appointment to see them in 2 weeks. Follow-up in the office with Dr. Nataliia Aguirre. Betina Pérez or Shameka Ramirez PA-C in 7 days. Please call [phone] to confirm your appointment. Call with any questions or concerns. [facility] [address] [address] [facility]  [phone]

## 2018-03-15 NOTE — ROUTINE PROCESS
TRANSFER - OUT REPORT:    Verbal report given to Reyna Whipple on Davis Nuñez  being transferred to room 507 for routine progression of care       Report consisted of patients Situation, Background, Assessment and   Recommendations(SBAR). Information from the following report(s) SBAR, OR Summary, Intake/Output and MAR was reviewed with the receiving nurse. Opportunity for questions and clarification was provided.       Patient transported with:   O2 @ 3 liters  Tech

## 2018-03-16 ENCOUNTER — HOME CARE VISIT (OUTPATIENT)
Dept: SCHEDULING | Facility: HOME HEALTH | Age: 28
End: 2018-03-16

## 2018-03-16 PROCEDURE — G0299 HHS/HOSPICE OF RN EA 15 MIN: HCPCS

## 2018-03-17 VITALS
SYSTOLIC BLOOD PRESSURE: 130 MMHG | HEART RATE: 64 BPM | OXYGEN SATURATION: 96 % | DIASTOLIC BLOOD PRESSURE: 88 MMHG | TEMPERATURE: 96 F | RESPIRATION RATE: 20 BRPM

## 2018-03-23 ENCOUNTER — HOME CARE VISIT (OUTPATIENT)
Dept: HOME HEALTH SERVICES | Facility: HOME HEALTH | Age: 28
End: 2018-03-23

## 2018-03-23 ENCOUNTER — TELEPHONE (OUTPATIENT)
Dept: ORTHOPEDIC SURGERY | Age: 28
End: 2018-03-23

## 2018-03-23 ENCOUNTER — DOCUMENTATION ONLY (OUTPATIENT)
Dept: ORTHOPEDIC SURGERY | Age: 28
End: 2018-03-23

## 2018-03-23 NOTE — TELEPHONE ENCOUNTER
Patient has not been seen for follow up since surgery on 3/14 and has canx two post op visits. HH will continue until patient is seen in the office. Please schedule patient for followup and advised that he needs to be seen next week.     Derian Blanc PA-C  3/23/2018   2:58 PM

## 2018-03-23 NOTE — PROGRESS NOTES
Jl Liao from Home health called and wanting to know if they can discharge him from [address] [place] [address].  [phone].

## 2018-03-23 NOTE — TELEPHONE ENCOUNTER
[address] RUIZ. NILS SAID THAT SHE WOULD LIKE TO KNOW IF DR. RUIZ WANTS CONTINUED HOME CARE FOR THE PATIENT OR IF IT IS OKAY TO DISCHARGE THE PATIENT. THAT THE PATIENT SAID HE IS DOING WELL. Stacy Castro TEL. [phone].

## 2018-03-23 NOTE — TELEPHONE ENCOUNTER
Called Leanna and let her know that we would contact the patient to schedule his appointment. Printed message and gave to call center to schedule appointment.

## 2018-03-23 NOTE — TELEPHONE ENCOUNTER
Patient was called and reminded of his upcoming appt scheduled for this Mon 03-26-18 @ 10:15am with MARBELLA Jacinto. Spoke with patient and he assured me he would attend.

## 2018-03-26 ENCOUNTER — OFFICE VISIT (OUTPATIENT)
Dept: ORTHOPEDIC SURGERY | Age: 28
End: 2018-03-26

## 2018-03-26 VITALS
OXYGEN SATURATION: 100 % | WEIGHT: 175 LBS | DIASTOLIC BLOOD PRESSURE: 85 MMHG | TEMPERATURE: 95.4 F | BODY MASS INDEX: 21.76 KG/M2 | SYSTOLIC BLOOD PRESSURE: 133 MMHG | HEIGHT: 75 IN | HEART RATE: 60 BPM

## 2018-03-26 DIAGNOSIS — S82.851A FRACTURE OF ANKLE, TRIMALLEOLAR, CLOSED, RIGHT, INITIAL ENCOUNTER: ICD-10-CM

## 2018-03-26 DIAGNOSIS — S82.851D CLOSED TRIMALLEOLAR FRACTURE OF RIGHT ANKLE WITH ROUTINE HEALING, SUBSEQUENT ENCOUNTER: Primary | ICD-10-CM

## 2018-03-26 DIAGNOSIS — M25.571 ACUTE RIGHT ANKLE PAIN: ICD-10-CM

## 2018-03-26 RX ORDER — HYDROCODONE BITARTRATE AND ACETAMINOPHEN 7.5; 325 MG/1; MG/1
TABLET ORAL
Qty: 50 TAB | Refills: 0 | Status: SHIPPED | OUTPATIENT
Start: 2018-03-26 | End: 2018-04-02 | Stop reason: CLARIF

## 2018-03-26 NOTE — MR AVS SNAPSHOT
[address], [address] [facility] 
[phone] Patient: America Peacock MRN: [id number] :1990 Visit Information Date & Time Provider Department Dept. Phone Encounter #  
 3/26/2018 10:15 AM Denia Donovan, [facility] [phone] Follow-up Instructions Return in about 1 week (around 2018) for follow up evaluation. Your Appointments 2018  8:30 AM  
POST OP with Denia Donovan PA-C  
[address] ([facility]) Appt Note: Removal Ext fix Right ankle & ORIF rt ankle 1wk fu  
 [facility] [address] [facility]  
[phone] [address] [facility] Upcoming Health Maintenance Date Due DTaP/Tdap/Td series (1 - Tdap) 10/10/2011 Influenza Age 5 to Adult 2017 Allergies as of 3/26/2018  Review Complete On: 3/26/2018 By: Denia Donovan PA-C No Known Allergies Current Immunizations  Never Reviewed No immunizations on file. Not reviewed this visit You Were Diagnosed With   
  
 Codes Comments Closed trimalleolar fracture of right ankle with routine healing, subsequent encounter    -  Primary ICD-10-CM: S25.405T ICD-9-CM: V54.19 Acute right ankle pain     ICD-10-CM: M25.571 ICD-9-CM: 719.47, 338.19 Fracture of ankle, trimalleolar, closed, right, initial encounter     ICD-10-CM: Q86.465E ICD-9-CM: 824.6 Vitals BP Pulse Temp Height(growth percentile) Weight(growth percentile) SpO2  
 133/85 60 95.4 °F (35.2 °C) (Oral) 6' 3\" (1.905 m) 175 lb (79.4 kg) 100% BMI Smoking Status 21.87 kg/m2 Never Smoker BMI and BSA Data Body Mass Index Body Surface Area  
 21.87 kg/m 2 2.05 m 2 Your Updated Medication List  
  
   
 This list is accurate as of 3/26/18 11:55 AM.  Always use your most recent med list.  
  
  
  
  
 aspirin 325 mg tablet Commonly known as:  ASPIRIN Take 1 Tab by mouth daily. * HYDROcodone-acetaminophen 7.5-325 mg per tablet Commonly known as:  NORCO  
TAKE ONE TABLET BY MOUTH Q 4 - 6 HRS PRN PAIN **AFTER SURGERY**DO NOT TAKE BEFORE SURGERY  Indications: Pain  
  
 * HYDROcodone-acetaminophen 7.5-325 mg per tablet Commonly known as:  NORCO  
TAKE ONE TO TWO TABLETS BY MOUTH Q 4 - 6 HRS PRN PAIN **AFTER SURGERY**DO NOT TAKE BEFORE SURGERY  Indications: Pain  
  
 polyethylene glycol 17 gram packet Commonly known as:  Elvia Deem Take 1 Packet by mouth daily. promethazine 25 mg tablet Commonly known as:  PHENERGAN Take 1 Tab by mouth every six (6) hours as needed for Nausea. * Notice: This list has 2 medication(s) that are the same as other medications prescribed for you. Read the directions carefully, and ask your doctor or other care provider to review them with you. Prescriptions Printed Refills HYDROcodone-acetaminophen (NORCO) 7.5-325 mg per tablet 0 Sig: TAKE ONE TO TWO TABLETS BY MOUTH Q 4 - 6 HRS PRN PAIN **AFTER SURGERY**DO NOT TAKE BEFORE SURGERY  Indications: Pain Class: Print We Performed the Following AMB POC XRAY, ANKLE; COMPLETE, 3+ VIE [88472 CPT(R)] AMB POC XRAY; TIBIA & FIBULA, TWO VIE [80405 CPT(R)] Follow-up Instructions Return in about 1 week (around 4/2/2018) for follow up evaluation. Patient Instructions Please keep your right lower extremity dry Continue non-weight bearing Continue to take aspirin as directed Follow up in one week Broken Ankle: Care Instructions Your Care Instructions An ankle may break (fracture) during sports, a fall, or other accidents.  Fractures can range from a small, hairline crack, to a bone or bones broken into two or more pieces. Your treatment depends on how bad the break is. Your doctor may have put your ankle in a splint or cast to allow it to heal or to keep it stable until you see another doctor. It may take weeks or months for your ankle to heal. You can help your ankle heal with some care at home. You heal best when you take good care of yourself. Eat a variety of healthy foods, and don't smoke. You may have had a sedative to help you relax. You may be unsteady after having sedation. It can take a few hours for the medicine's effects to wear off. Common side effects of sedation include nausea, vomiting, and feeling sleepy or tired. The doctor has checked you carefully, but problems can develop later. If you notice any problems or new symptoms,  get medical treatment right away. Follow-up care is a key part of your treatment and safety. Be sure to make and go to all appointments, and call your doctor if you are having problems. It's also a good idea to know your test results and keep a list of the medicines you take. How can you care for yourself at home? · If the doctor gave you a sedative: ¨ For 24 hours, don't do anything that requires attention to detail. It takes time for the medicine's effects to completely wear off. ¨ For your safety, do not drive or operate any machinery that could be dangerous. Wait until the medicine wears off and you can think clearly and react easily. · Put ice or a cold pack on your ankle for 10 to 20 minutes at a time. Try to do this every 1 to 2 hours for the next 3 days (when you are awake). Put a thin cloth between the ice and your cast or splint. Keep your cast or splint dry. · Follow the cast care instructions your doctor gives you. If you have a splint, do not take it off unless your doctor tells you to. · Be safe with medicines. Take pain medicines exactly as directed. ¨ If the doctor gave you a prescription medicine for pain, take it as prescribed. ¨ If you are not taking a prescription pain medicine, ask your doctor if you can take an over-the-counter medicine. · Prop up your leg on pillows in the first few days after the injury. Keep the ankle higher than the level of your heart. This will help reduce swelling. · Do not put weight on your ankle unless your doctor tells you to. Use crutches to walk. · Follow instructions for exercises to keep your leg strong. · Wiggle your toes often to reduce swelling and stiffness. When should you call for help? Call 911 anytime you think you may need emergency care. For example, call if: 
? · You have chest pain, are short of breath, or you cough up blood. ? · You are very sleepy and you have trouble waking up. ?Call your doctor now or seek immediate medical care if: 
? · You have new or worse nausea or vomiting. ? · You have new or worse pain. ? · Your foot is cool or pale or changes color. ? · You have tingling, weakness, or numbness in your toes. ? · Your cast or splint feels too tight. ? · You have signs of a blood clot in your leg (called a deep vein thrombosis), such as: 
¨ Pain in your calf, back of the knee, thigh, or groin. ¨ Redness or swelling in your leg. ? Watch closely for changes in your health, and be sure to contact your doctor if: 
? · You have a problem with your splint or cast.  
? · You do not get better as expected. Where can you learn more? Go to http://tony-iris.info/. Enter P763 in the search box to learn more about \"Broken Ankle: Care Instructions. \" Current as of: March 21, 2017 Content Version: 11.4 © 5368-2146 [employer]lbs. Care instructions adapted under license by [employer] (which disclaims liability or warranty for this information).  If you have questions about a medical condition or this instruction, always ask your healthcare professional. [employer] disclaims any warranty or liability for your use of this information. Please provide this summary of care documentation to your next provider. Your primary care clinician is listed as NONE. If you have any questions after today's visit, please call [phone].

## 2018-03-26 NOTE — PATIENT INSTRUCTIONS
Please keep your right lower extremity dry  Continue non-weight bearing  Continue to take aspirin as directed  Follow up in one week         Broken Ankle: Care Instructions  Your Care Instructions    An ankle may break (fracture) during sports, a fall, or other accidents. Fractures can range from a small, hairline crack, to a bone or bones broken into two or more pieces. Your treatment depends on how bad the break is. Your doctor may have put your ankle in a splint or cast to allow it to heal or to keep it stable until you see another doctor. It may take weeks or months for your ankle to heal. You can help your ankle heal with some care at home. You heal best when you take good care of yourself. Eat a variety of healthy foods, and don't smoke. You may have had a sedative to help you relax. You may be unsteady after having sedation. It can take a few hours for the medicine's effects to wear off. Common side effects of sedation include nausea, vomiting, and feeling sleepy or tired. The doctor has checked you carefully, but problems can develop later. If you notice any problems or new symptoms,  get medical treatment right away. Follow-up care is a key part of your treatment and safety. Be sure to make and go to all appointments, and call your doctor if you are having problems. It's also a good idea to know your test results and keep a list of the medicines you take. How can you care for yourself at home? · If the doctor gave you a sedative:  ¨ For 24 hours, don't do anything that requires attention to detail. It takes time for the medicine's effects to completely wear off. ¨ For your safety, do not drive or operate any machinery that could be dangerous. Wait until the medicine wears off and you can think clearly and react easily. · Put ice or a cold pack on your ankle for 10 to 20 minutes at a time. Try to do this every 1 to 2 hours for the next 3 days (when you are awake).  Put a thin cloth between the ice and your cast or splint. Keep your cast or splint dry. · Follow the cast care instructions your doctor gives you. If you have a splint, do not take it off unless your doctor tells you to. · Be safe with medicines. Take pain medicines exactly as directed. ¨ If the doctor gave you a prescription medicine for pain, take it as prescribed. ¨ If you are not taking a prescription pain medicine, ask your doctor if you can take an over-the-counter medicine. · Prop up your leg on pillows in the first few days after the injury. Keep the ankle higher than the level of your heart. This will help reduce swelling. · Do not put weight on your ankle unless your doctor tells you to. Use crutches to walk. · Follow instructions for exercises to keep your leg strong. · Wiggle your toes often to reduce swelling and stiffness. When should you call for help? Call 911 anytime you think you may need emergency care. For example, call if:  ? · You have chest pain, are short of breath, or you cough up blood. ? · You are very sleepy and you have trouble waking up. ?Call your doctor now or seek immediate medical care if:  ? · You have new or worse nausea or vomiting. ? · You have new or worse pain. ? · Your foot is cool or pale or changes color. ? · You have tingling, weakness, or numbness in your toes. ? · Your cast or splint feels too tight. ? · You have signs of a blood clot in your leg (called a deep vein thrombosis), such as:  ¨ Pain in your calf, back of the knee, thigh, or groin. ¨ Redness or swelling in your leg. ? Watch closely for changes in your health, and be sure to contact your doctor if:  ? · You have a problem with your splint or cast.   ? · You do not get better as expected. Where can you learn more? Go to http://tony-iirs.info/. Enter P763 in the search box to learn more about \"Broken Ankle: Care Instructions. \"  Current as of: March 21, 2017  Content Version: 11.4  © 1289-5136 [employer]. Care instructions adapted under license by [employer] (which disclaims liability or warranty for this information). If you have questions about a medical condition or this instruction, always ask your healthcare professional. [place] [address] any warranty or liability for your use of this information.

## 2018-03-26 NOTE — PROCEDURES
X-rays, 6 views of the right ankle ankle and tib/fib reveals post op changes s/p removal of external fixator and ORIF right ankle. Overall alignment looks good. Hardware in good position. Non-operative treatment of fibula shaft fracture. Skin staples and pin sites s/p removal of external fixator visible.

## 2018-03-26 NOTE — PROGRESS NOTES
Patient: Arturo Hall                MRN: [id number]       SSN: [id number]  YOB: 1990           AGE: 32 y.o. SEX: male    PCP:None    POST OP OFFICE NOTE  DOS: 3/14/18    Chief Complaint:   Chief Complaint   Patient presents with    Ankle Pain     RIGHT       HPI:     The patient is a 32 y.o. male who presents today for follow up 12 days s/p removal of external fixator, Open reduction internal fixation right ankle. Patient has canx several post op appointments and presents today for first post op visit. Patient states that splint got wet yesterday. Upon examination of this skin it is noted that patient has significant maceration due to moisture. He has been NWB to the right lower extremity. Patient reports no pain. Patient denies any fever, chills, chest pain, shortness of breath or calf pain. There are no new illness or injuries to report since last seen in the office. Patient is on ASA  for DVT prophylaxis. PHYSICAL EXAM:     Visit Vitals    /85    Pulse 60    Temp 95.4 °F (35.2 °C) (Oral)    Ht 6' 3\" (1.905 m)    Wt 175 lb (79.4 kg)    SpO2 100%    BMI 21.87 kg/m2         Pain Scale: 2/10      GEN:  Alert, well developed, well nourished, well appearing 32 y.o. male in no acute distress. PSYCH:  Normal affect, mood, and conduct. alert, oriented x 3 alert, oriented x 3, no dementia  M/S EXAMINATION OF: right foot/ankle  DRESSINGS: soiled  DRAINAGE: none  INCISION: Incision looks good, skin well approximated, no dehiscence, nylon sutures and skin staples in place without disruption. SKIN: mild edema , no erythema, mild  ecchymosis, no warmth, significant maceration noted due to moisture. TENDERNESS:  mild tenderness to palpation (as expected after surgery)  NEUROVASCULAR:  grossly intact. Positive distal pulses and capillary refill. DVT ASSESSMENT:  The calf is not tender to palpation.  No evidence of DVT seen on physical exam.  ROM: not tested RADIOGRAPHS & DIAGNOSTIC STUDIES     Results for orders placed or performed in visit on 03/26/18   AMB POC XRAY, ANKLE; COMPLETE, 3+ VIE    Red Enriquez PA-C     3/26/2018  4:43 PM  X-rays, 6 views of the right ankle ankle and tib/fib reveals post   op changes s/p removal of external fixator and ORIF right ankle. Overall alignment looks good. Hardware in good position. Non-operative treatment of fibula shaft fracture. Skin staples   and pin sites s/p removal of external fixator visible. AMB POC XRAY; TIBIA & FIBULA, TWO VIE    Red Enriquez PA-C     3/26/2018  4:43 PM  X-rays, 6 views of the right ankle ankle and tib/fib reveals post   op changes s/p removal of external fixator and ORIF right ankle. Overall alignment looks good. Hardware in good position. Non-operative treatment of fibula shaft fracture. Skin staples   and pin sites s/p removal of external fixator visible. IMPRESSION:     Encounter Diagnoses     ICD-10-CM ICD-9-CM   1. Closed trimalleolar fracture of right ankle with routine healing, subsequent encounter S82.851D V54.19   2. Acute right ankle pain M25.571 719.47     338.19   3. Fracture of ankle, trimalleolar, closed, right, initial encounter S82.851A 824.6       PLAN:         Orders Placed This Encounter    [43131] Ankle Min 3V    [98194] Tib/Fib 2V    HYDROcodone-acetaminophen (NORCO) 7.5-325 mg per tablet                  Patient Instructions               Please keep your right lower extremity dry  Continue non-weight bearing  Continue to take aspirin as directed  Follow up in one week         Broken Ankle: Care Instructions  Your Care Instructions    An ankle may break (fracture) during sports, a fall, or other accidents. Fractures can range from a small, hairline crack, to a bone or bones broken into two or more pieces. Your treatment depends on how bad the break is.   Your doctor may have put your ankle in a splint or cast to allow it to heal or to keep it stable until you see another doctor. It may take weeks or months for your ankle to heal. You can help your ankle heal with some care at home. You heal best when you take good care of yourself. Eat a variety of healthy foods, and don't smoke. You may have had a sedative to help you relax. You may be unsteady after having sedation. It can take a few hours for the medicine's effects to wear off. Common side effects of sedation include nausea, vomiting, and feeling sleepy or tired. The doctor has checked you carefully, but problems can develop later. If you notice any problems or new symptoms,  get medical treatment right away. Follow-up care is a key part of your treatment and safety. Be sure to make and go to all appointments, and call your doctor if you are having problems. It's also a good idea to know your test results and keep a list of the medicines you take. How can you care for yourself at home? · If the doctor gave you a sedative:  ¨ For 24 hours, don't do anything that requires attention to detail. It takes time for the medicine's effects to completely wear off. ¨ For your safety, do not drive or operate any machinery that could be dangerous. Wait until the medicine wears off and you can think clearly and react easily. · Put ice or a cold pack on your ankle for 10 to 20 minutes at a time. Try to do this every 1 to 2 hours for the next 3 days (when you are awake). Put a thin cloth between the ice and your cast or splint. Keep your cast or splint dry. · Follow the cast care instructions your doctor gives you. If you have a splint, do not take it off unless your doctor tells you to. · Be safe with medicines. Take pain medicines exactly as directed. ¨ If the doctor gave you a prescription medicine for pain, take it as prescribed. ¨ If you are not taking a prescription pain medicine, ask your doctor if you can take an over-the-counter medicine.   · Prop up your leg on pillows in the first few days after the injury. Keep the ankle higher than the level of your heart. This will help reduce swelling. · Do not put weight on your ankle unless your doctor tells you to. Use crutches to walk. · Follow instructions for exercises to keep your leg strong. · Wiggle your toes often to reduce swelling and stiffness. When should you call for help? Call 911 anytime you think you may need emergency care. For example, call if:  ? · You have chest pain, are short of breath, or you cough up blood. ? · You are very sleepy and you have trouble waking up. ?Call your doctor now or seek immediate medical care if:  ? · You have new or worse nausea or vomiting. ? · You have new or worse pain. ? · Your foot is cool or pale or changes color. ? · You have tingling, weakness, or numbness in your toes. ? · Your cast or splint feels too tight. ? · You have signs of a blood clot in your leg (called a deep vein thrombosis), such as:  ¨ Pain in your calf, back of the knee, thigh, or groin. ¨ Redness or swelling in your leg. ? Watch closely for changes in your health, and be sure to contact your doctor if:  ? · You have a problem with your splint or cast.   ? · You do not get better as expected. Where can you learn more? Go to http://tony-iris.info/. Enter P763 in the search box to learn more about \"Broken Ankle: Care Instructions. \"  Current as of: March 21, 2017  Content Version: 11.4  © 5574-8452 [employer]. Care instructions adapted under license by [employer] (which disclaims liability or warranty for this information).  If you have questions about a medical condition or this instruction, always ask your healthcare professional. [place] [address] any warranty or liability for your use of this information.            Patient has been discussed with Dr. Dawit Sousa during this visit and he agrees with the assessment and plan    Patient expresses understanding of the plan. Patient education provided on post surgical care. REVIEW OF SYSTEMS:     Otherwise as noted in HPI      PAST MEDICAL HISTORY:     Past Medical History:   Diagnosis Date    Hypertension     pt denies this    Seasonal allergic rhinitis        MEDICATIONS:     Current Outpatient Prescriptions   Medication Sig    HYDROcodone-acetaminophen (NORCO) 7.5-325 mg per tablet TAKE ONE TO TWO TABLETS BY MOUTH Q 4 - 6 HRS PRN PAIN **AFTER SURGERY**DO NOT TAKE BEFORE SURGERY  Indications: Pain    aspirin (ASPIRIN) 325 mg tablet Take 1 Tab by mouth daily.  HYDROcodone-acetaminophen (NORCO) 7.5-325 mg per tablet TAKE ONE TABLET BY MOUTH Q 4 - 6 HRS PRN PAIN **AFTER SURGERY**DO NOT TAKE BEFORE SURGERY  Indications: Pain    polyethylene glycol (MIRALAX) 17 gram packet Take 1 Packet by mouth daily.  promethazine (PHENERGAN) 25 mg tablet Take 1 Tab by mouth every six (6) hours as needed for Nausea. No current facility-administered medications for this visit. ALLERGIES:     No Known Allergies      PAST SURGICAL HISTORY:     Past Surgical History:   Procedure Laterality Date    HX ORTHOPAEDIC  02/27/2018    External Fixation right ankle       SOCIAL HISTORY:     Social History     Social History    Marital status: SINGLE     Spouse name: N/A    Number of children: N/A    Years of education: N/A     Occupational History    Not on file.      Social History Main Topics    Smoking status: Never Smoker    Smokeless tobacco: Never Used    Alcohol use No    Drug use: No    Sexual activity: Not on file     Other Topics Concern    Not on file     Social History Narrative       FAMILY HISTORY:     Family History   Problem Relation Age of Onset    Hypertension Other     Hypertension Maternal Grandmother            Sabino Olson PA-C  3/26/2018

## 2018-03-30 DIAGNOSIS — S82.851A FRACTURE OF ANKLE, TRIMALLEOLAR, CLOSED, RIGHT, INITIAL ENCOUNTER: Primary | ICD-10-CM

## 2018-04-02 ENCOUNTER — OFFICE VISIT (OUTPATIENT)
Dept: ORTHOPEDIC SURGERY | Age: 28
End: 2018-04-02

## 2018-04-02 VITALS
OXYGEN SATURATION: 100 % | DIASTOLIC BLOOD PRESSURE: 58 MMHG | HEIGHT: 75 IN | BODY MASS INDEX: 21.71 KG/M2 | TEMPERATURE: 96.9 F | WEIGHT: 174.6 LBS | HEART RATE: 58 BPM | SYSTOLIC BLOOD PRESSURE: 132 MMHG

## 2018-04-02 DIAGNOSIS — Z98.890 POST-OPERATIVE STATE: ICD-10-CM

## 2018-04-02 DIAGNOSIS — S82.851D CLOSED TRIMALLEOLAR FRACTURE OF RIGHT ANKLE WITH ROUTINE HEALING, SUBSEQUENT ENCOUNTER: ICD-10-CM

## 2018-04-02 DIAGNOSIS — M25.571 ACUTE RIGHT ANKLE PAIN: Primary | ICD-10-CM

## 2018-04-02 RX ORDER — HYDROCODONE BITARTRATE AND ACETAMINOPHEN 5; 325 MG/1; MG/1
TABLET ORAL
Qty: 50 TAB | Refills: 0 | Status: SHIPPED | OUTPATIENT
Start: 2018-04-02 | End: 2018-04-12 | Stop reason: CLARIF

## 2018-04-02 NOTE — PROGRESS NOTES
Patient: Nicky Meenses                MRN: [id number]       SSN: [id number]  YOB: 1990           AGE: 32 y.o. SEX: male    PCP:None    POST OP OFFICE NOTE  DOS: 3/14/18    Chief Complaint:   Chief Complaint   Patient presents with    Ankle Pain     right       HPI:     The patient is a 32 y.o. male who presents today for follow up 19 days s/p removal of external fixator, Open reduction internal fixation right ankle. Patient has been NWB to the right lower extremity. Patient reports no pain. Patient denies any fever, chills, chest pain, shortness of breath or calf pain. There are no new illness or injuries to report since last seen in the office. Patient is on ASA  for DVT prophylaxis. Patient has had some pain with weather change. PHYSICAL EXAM:     Visit Vitals    /58 (BP 1 Location: Left arm)    Pulse (!) 58    Temp 96.9 °F (36.1 °C)    Ht 6' 3\" (1.905 m)    Wt 174 lb 9.6 oz (79.2 kg)    SpO2 100%    BMI 21.82 kg/m2         Pain Scale: 0 - No pain/10      GEN:  Alert, well developed, well nourished, well appearing 32 y.o. male in no acute distress. PSYCH:  Normal affect, mood, and conduct. alert, oriented x 3 alert, oriented x 3, no dementia  M/S EXAMINATION OF: right foot/ankle  DRESSINGS: soiled  DRAINAGE: none  INCISION: Incision looks good, skin well approximated, no dehiscence, nylon sutures and skin staples in place without disruption. SKIN: mild edema , no erythema, mild  ecchymosis, no warmth, dry  TENDERNESS:  mild tenderness to palpation   NEUROVASCULAR:  grossly intact. Positive distal pulses and capillary refill. DVT ASSESSMENT:  The calf is not tender to palpation. No evidence of DVT seen on physical exam.  ROM: not tested       RADIOGRAPHS & DIAGNOSTIC STUDIES     No results found for any visits on 04/02/18. IMPRESSION:     Encounter Diagnoses     ICD-10-CM ICD-9-CM   1. Acute right ankle pain M25.571 719.47     338.19   2.  Closed trimalleolar fracture of right ankle with routine healing, subsequent encounter S82.851D V54.19   3. Post-operative state Z98.890 V45.89       PLAN:         Orders Placed This Encounter    HYDROcodone-acetaminophen (NORCO) 5-325 mg per tablet                  Patient Instructions             Sutures removed in the office today. Short Leg cast applied. Please keep your right lower extremity dry  Continue non-weight bearing  Continue to take aspirin as directed  Take a calcium + vitamin D supplement  Follow up in two weeks or sooner as needed         Broken Ankle: Care Instructions  Your Care Instructions    An ankle may break (fracture) during sports, a fall, or other accidents. Fractures can range from a small, hairline crack, to a bone or bones broken into two or more pieces. Your treatment depends on how bad the break is. Your doctor may have put your ankle in a splint or cast to allow it to heal or to keep it stable until you see another doctor. It may take weeks or months for your ankle to heal. You can help your ankle heal with some care at home. You heal best when you take good care of yourself. Eat a variety of healthy foods, and don't smoke. You may have had a sedative to help you relax. You may be unsteady after having sedation. It can take a few hours for the medicine's effects to wear off. Common side effects of sedation include nausea, vomiting, and feeling sleepy or tired. The doctor has checked you carefully, but problems can develop later. If you notice any problems or new symptoms,  get medical treatment right away. Follow-up care is a key part of your treatment and safety. Be sure to make and go to all appointments, and call your doctor if you are having problems. It's also a good idea to know your test results and keep a list of the medicines you take. How can you care for yourself at home?   · If the doctor gave you a sedative:  ¨ For 24 hours, don't do anything that requires attention to detail. It takes time for the medicine's effects to completely wear off. ¨ For your safety, do not drive or operate any machinery that could be dangerous. Wait until the medicine wears off and you can think clearly and react easily. · Put ice or a cold pack on your ankle for 10 to 20 minutes at a time. Try to do this every 1 to 2 hours for the next 3 days (when you are awake). Put a thin cloth between the ice and your cast or splint. Keep your cast or splint dry. · Follow the cast care instructions your doctor gives you. If you have a splint, do not take it off unless your doctor tells you to. · Be safe with medicines. Take pain medicines exactly as directed. ¨ If the doctor gave you a prescription medicine for pain, take it as prescribed. ¨ If you are not taking a prescription pain medicine, ask your doctor if you can take an over-the-counter medicine. · Prop up your leg on pillows in the first few days after the injury. Keep the ankle higher than the level of your heart. This will help reduce swelling. · Do not put weight on your ankle unless your doctor tells you to. Use crutches to walk. · Follow instructions for exercises to keep your leg strong. · Wiggle your toes often to reduce swelling and stiffness. When should you call for help? Call 911 anytime you think you may need emergency care. For example, call if:  ? · You have chest pain, are short of breath, or you cough up blood. ? · You are very sleepy and you have trouble waking up. ?Call your doctor now or seek immediate medical care if:  ? · You have new or worse nausea or vomiting. ? · You have new or worse pain. ? · Your foot is cool or pale or changes color. ? · You have tingling, weakness, or numbness in your toes. ? · Your cast or splint feels too tight. ? · You have signs of a blood clot in your leg (called a deep vein thrombosis), such as:  ¨ Pain in your calf, back of the knee, thigh, or groin.   ¨ Redness or swelling in your leg. ? Watch closely for changes in your health, and be sure to contact your doctor if:  ? · You have a problem with your splint or cast.   ? · You do not get better as expected. Where can you learn more? Go to http://tony-iris.info/. Enter P763 in the search box to learn more about \"Broken Ankle: Care Instructions. \"  Current as of: March 21, 2017  Content Version: 11.4  © 1989-5094 [employer]. Care instructions adapted under license by [employer] (which disclaims liability or warranty for this information). If you have questions about a medical condition or this instruction, always ask your healthcare professional. [place] [address] any warranty or liability for your use of this information. X-rays of the right ankle and tib/fib at [facility]    Patient has been examined and evaluated by Dr. Danish Dewey during this visit and he agrees with the assessment and plan    Patient expresses understanding of the plan. Patient education provided on post surgical care. REVIEW OF SYSTEMS:     Otherwise as noted in HPI      PAST MEDICAL HISTORY:     Past Medical History:   Diagnosis Date    Hypertension     pt denies this    Seasonal allergic rhinitis        MEDICATIONS:     Current Outpatient Prescriptions   Medication Sig    HYDROcodone-acetaminophen (NORCO) 5-325 mg per tablet TAKE ONE TO TWO TABLETS PO Q 6-8 HRS PRN PAIN  Indications: Pain    aspirin (ASPIRIN) 325 mg tablet Take 1 Tab by mouth daily.  polyethylene glycol (MIRALAX) 17 gram packet Take 1 Packet by mouth daily.  promethazine (PHENERGAN) 25 mg tablet Take 1 Tab by mouth every six (6) hours as needed for Nausea. No current facility-administered medications for this visit.         ALLERGIES:     No Known Allergies      PAST SURGICAL HISTORY:     Past Surgical History:   Procedure Laterality Date    HX ANKLE FRACTURE TX Right 02/27/2018    HX ORTHOPAEDIC  02/27/2018 External Fixation right ankle       SOCIAL HISTORY:     Social History     Social History    Marital status: SINGLE     Spouse name: N/A    Number of children: N/A    Years of education: N/A     Occupational History    Not on file.      Social History Main Topics    Smoking status: Never Smoker    Smokeless tobacco: Never Used    Alcohol use No    Drug use: No    Sexual activity: Not on file     Other Topics Concern    Not on file     Social History Narrative       FAMILY HISTORY:     Family History   Problem Relation Age of Onset    Hypertension Other     Hypertension Maternal Grandmother            Kelly Goodell, PA-C  4/2/2018

## 2018-04-02 NOTE — MR AVS SNAPSHOT
[place], [address] [facility] 
[phone] Patient: Adela Lombardo MRN: [id number] :1990 Visit Information Date & Time Provider Department Dept. Phone Encounter #  
 2018  8:30 AM Denia Donovan [address] [phone] [id number] Follow-up Instructions Return in about 2 weeks (around 2018) for follow up evaluation. Your Appointments 2018  9:45 AM  
POST OP with Denia Donovan PA-C  
[facility] - [facility] ([facility]) Appt Note: 2 wk f/u  
 [place], [address] [facility]  
[phone] [address] [place] [address], [address] Upcoming Health Maintenance Date Due DTaP/Tdap/Td series (1 - Tdap) 10/10/2011 Influenza Age 5 to Adult 2017 Allergies as of 2018  Review Complete On: 2018 By: Denia Donovan PA-C No Known Allergies Current Immunizations  Never Reviewed No immunizations on file. Not reviewed this visit You Were Diagnosed With   
  
 Codes Comments Acute right ankle pain    -  Primary ICD-10-CM: M25.571 ICD-9-CM: 719.47, 338.19 Closed trimalleolar fracture of right ankle with routine healing, subsequent encounter     ICD-10-CM: S82.851D ICD-9-CM: V54.19 Post-operative state     ICD-10-CM: V82.676 ICD-9-CM: V45.89 Vitals BP Pulse Temp Height(growth percentile) Weight(growth percentile) SpO2  
 132/58 (BP 1 Location: Left arm) (!) 58 96.9 °F (36.1 °C) 6' 3\" (1.905 m) 174 lb 9.6 oz (79.2 kg) 100% BMI Smoking Status 21.82 kg/m2 Never Smoker BMI and BSA Data Body Mass Index Body Surface Area  
 21.82 kg/m 2 2.05 m 2 Your Updated Medication List  
  
   
This list is accurate as of 18  9:43 AM.  Always use your most recent med list.  
  
  
  
  
 aspirin 325 mg tablet Commonly known as:  ASPIRIN Take 1 Tab by mouth daily. HYDROcodone-acetaminophen 5-325 mg per tablet Commonly known as:  NORCO  
TAKE ONE TO TWO TABLETS PO Q 6-8 HRS PRN PAIN  Indications: Pain  
  
 polyethylene glycol 17 gram packet Commonly known as:  Ben Gram Take 1 Packet by mouth daily. promethazine 25 mg tablet Commonly known as:  PHENERGAN Take 1 Tab by mouth every six (6) hours as needed for Nausea. Prescriptions Printed Refills HYDROcodone-acetaminophen (NORCO) 5-325 mg per tablet 0 Sig: TAKE ONE TO TWO TABLETS PO Q 6-8 HRS PRN PAIN  Indications: Pain Class: Print Follow-up Instructions Return in about 2 weeks (around 4/16/2018) for follow up evaluation. Patient Instructions Sutures removed in the office today. Short Leg cast applied. Please keep your right lower extremity dry Continue non-weight bearing Continue to take aspirin as directed Take a calcium + vitamin D supplement Follow up in two weeks or sooner as needed Broken Ankle: Care Instructions Your Care Instructions An ankle may break (fracture) during sports, a fall, or other accidents. Fractures can range from a small, hairline crack, to a bone or bones broken into two or more pieces. Your treatment depends on how bad the break is. Your doctor may have put your ankle in a splint or cast to allow it to heal or to keep it stable until you see another doctor. It may take weeks or months for your ankle to heal. You can help your ankle heal with some care at home. You heal best when you take good care of yourself. Eat a variety of healthy foods, and don't smoke. You may have had a sedative to help you relax. You may be unsteady after having sedation. It can take a few hours for the medicine's effects to wear off. Common side effects of sedation include nausea, vomiting, and feeling sleepy or tired. The doctor has checked you carefully, but problems can develop later. If you notice any problems or new symptoms,  get medical treatment right away. Follow-up care is a key part of your treatment and safety. Be sure to make and go to all appointments, and call your doctor if you are having problems. It's also a good idea to know your test results and keep a list of the medicines you take. How can you care for yourself at home? · If the doctor gave you a sedative: ¨ For 24 hours, don't do anything that requires attention to detail. It takes time for the medicine's effects to completely wear off. ¨ For your safety, do not drive or operate any machinery that could be dangerous. Wait until the medicine wears off and you can think clearly and react easily. · Put ice or a cold pack on your ankle for 10 to 20 minutes at a time. Try to do this every 1 to 2 hours for the next 3 days (when you are awake). Put a thin cloth between the ice and your cast or splint. Keep your cast or splint dry. · Follow the cast care instructions your doctor gives you. If you have a splint, do not take it off unless your doctor tells you to. · Be safe with medicines. Take pain medicines exactly as directed. ¨ If the doctor gave you a prescription medicine for pain, take it as prescribed. ¨ If you are not taking a prescription pain medicine, ask your doctor if you can take an over-the-counter medicine. · Prop up your leg on pillows in the first few days after the injury. Keep the ankle higher than the level of your heart. This will help reduce swelling. · Do not put weight on your ankle unless your doctor tells you to. Use crutches to walk. · Follow instructions for exercises to keep your leg strong. · Wiggle your toes often to reduce swelling and stiffness. When should you call for help? Call 911 anytime you think you may need emergency care. For example, call if: ? · You have chest pain, are short of breath, or you cough up blood. ? · You are very sleepy and you have trouble waking up. ?Call your doctor now or seek immediate medical care if: 
? · You have new or worse nausea or vomiting. ? · You have new or worse pain. ? · Your foot is cool or pale or changes color. ? · You have tingling, weakness, or numbness in your toes. ? · Your cast or splint feels too tight. ? · You have signs of a blood clot in your leg (called a deep vein thrombosis), such as: 
¨ Pain in your calf, back of the knee, thigh, or groin. ¨ Redness or swelling in your leg. ? Watch closely for changes in your health, and be sure to contact your doctor if: 
? · You have a problem with your splint or cast.  
? · You do not get better as expected. Where can you learn more? Go to http://tony-iris.info/. Enter P763 in the search box to learn more about \"Broken Ankle: Care Instructions. \" Current as of: March 21, 2017 Content Version: 11.4 © 4914-4086 [employer]. Care instructions adapted under license by [employer] (which disclaims liability or warranty for this information). If you have questions about a medical condition or this instruction, always ask your healthcare professional. [address] any warranty or liability for your use of this information. Please provide this summary of care documentation to your next provider. Your primary care clinician is listed as NONE. If you have any questions after today's visit, please call [phone].

## 2018-04-12 DIAGNOSIS — S82.851D CLOSED TRIMALLEOLAR FRACTURE OF RIGHT ANKLE WITH ROUTINE HEALING, SUBSEQUENT ENCOUNTER: ICD-10-CM

## 2018-04-12 RX ORDER — HYDROCODONE BITARTRATE AND ACETAMINOPHEN 5; 325 MG/1; MG/1
TABLET ORAL
Qty: 50 TAB | Refills: 0 | Status: CANCELLED | OUTPATIENT
Start: 2018-04-12

## 2018-04-12 RX ORDER — TRAMADOL HYDROCHLORIDE 50 MG/1
50-100 TABLET ORAL
Qty: 50 TAB | Refills: 0 | OUTPATIENT
Start: 2018-04-12 | End: 2018-04-16

## 2018-04-12 NOTE — TELEPHONE ENCOUNTER
Request for Norco 5/325 denied. Patient can have an rx for Ultram at this time. Rx has been signed into patient chart.     Angle Phipps PA-C  4/12/2018   12:17 PM

## 2018-04-12 NOTE — TELEPHONE ENCOUNTER
Patient requesting a refill of HYDROcodone-acetaminophen (NORCO) 5-325 mg  To be picked up at [facility] office. If possible, can this be filled prior to upcoming appt 4/17 as patient is out. Please advise patient at [phone].

## 2018-04-12 NOTE — TELEPHONE ENCOUNTER
Last Visit: 04/02/2018 with MARBELLA Sheffield   Date of Surgery: 03/14/2018 Removal of external fixator ORIF   Next Appointment: 04/17/2018 with MARBELLA Allen   Previous Refill Encounters: 04/02/2018 per MARBELLA Allen #[phone]     Requested Prescriptions     Pending Prescriptions Disp Refills    HYDROcodone-acetaminophen (NORCO) 5-325 mg per tablet 50 Tab 0     Sig: TAKE ONE TO TWO TABLETS PO Q 6-8 HRS PRN PAIN  Indications: Pain

## 2018-04-13 DIAGNOSIS — S82.851D CLOSED TRIMALLEOLAR FRACTURE OF RIGHT ANKLE WITH ROUTINE HEALING, SUBSEQUENT ENCOUNTER: ICD-10-CM

## 2018-04-13 RX ORDER — TRAMADOL HYDROCHLORIDE 50 MG/1
50-100 TABLET ORAL
Qty: 50 TAB | Refills: 0 | Status: CANCELLED | OUTPATIENT
Start: 2018-04-13

## 2018-04-16 RX ORDER — HYDROCODONE BITARTRATE AND ACETAMINOPHEN 5; 325 MG/1; MG/1
TABLET ORAL
Qty: 20 TAB | Refills: 0 | Status: SHIPPED | OUTPATIENT
Start: 2018-04-16 | End: 2018-07-27

## 2018-04-16 NOTE — TELEPHONE ENCOUNTER
Please advise patient this will be last refill of narcotic pain medicine. He will be prescribed a NSAID at next refill request. He should use this medication sparingly for severe pain only.     Curlene Essex, PA-C  4/16/2018   12:27 PM

## 2018-04-23 ENCOUNTER — OFFICE VISIT (OUTPATIENT)
Dept: ORTHOPEDIC SURGERY | Age: 28
End: 2018-04-23

## 2018-04-23 VITALS
WEIGHT: 175 LBS | HEIGHT: 75 IN | TEMPERATURE: 96.9 F | DIASTOLIC BLOOD PRESSURE: 75 MMHG | SYSTOLIC BLOOD PRESSURE: 124 MMHG | OXYGEN SATURATION: 100 % | BODY MASS INDEX: 21.76 KG/M2 | HEART RATE: 63 BPM

## 2018-04-23 DIAGNOSIS — Z98.890 POST-OPERATIVE STATE: ICD-10-CM

## 2018-04-23 DIAGNOSIS — S82.201D CLOSED FRACTURE OF RIGHT TIBIA AND FIBULA WITH ROUTINE HEALING, SUBSEQUENT ENCOUNTER: Primary | ICD-10-CM

## 2018-04-23 DIAGNOSIS — S82.401D CLOSED FRACTURE OF RIGHT TIBIA AND FIBULA WITH ROUTINE HEALING, SUBSEQUENT ENCOUNTER: Primary | ICD-10-CM

## 2018-04-23 NOTE — PROGRESS NOTES
Patient: Cruz George                MRN: [id number]       SSN: [id number]  YOB: 1990            AGE: 32 y.o. SEX: male    PCP:None    POST OP OFFICE NOTE  DOS: 3/14/18    Chief Complaint:   Chief Complaint   Patient presents with    Ankle Pain     RIGHT       HPI:     The patient is a 32 y.o. male who presents today for follow up 40 days s/p removal of external fixator, Open reduction internal fixation right ankle. Patient has been NWB to the right lower extremity. Patient reports no pain. Patient denies any fever, chills, chest pain, shortness of breath or calf pain. There are no new illness or injuries to report since last seen in the office. Patient is on ASA  for DVT prophylaxis. Patient has had some pain with weather change. PHYSICAL EXAM:     Visit Vitals    /75    Pulse 63    Temp 96.9 °F (36.1 °C) (Oral)    Ht 6' 3\" (1.905 m)    Wt 175 lb (79.4 kg)    SpO2 100%    BMI 21.87 kg/m2         Pain Scale: 0 - No pain/10      GEN:  Alert, well developed, well nourished, well appearing 32 y.o. male in no acute distress. PSYCH:  Normal affect, mood, and conduct. alert, oriented x 3 alert, oriented x 3, no dementia  M/S EXAMINATION OF: right foot/ankle  DRESSINGS: CDI  DRAINAGE: none  INCISION: Incision looks good, skin well approximated  SKIN: mild edema , no erythema, mild  ecchymosis, no warmth, dry skin. Disruption of scab to anterior ankle upon cast removal.  TENDERNESS:  mild tenderness to palpation   NEUROVASCULAR:  grossly intact. Positive distal pulses and capillary refill. DVT ASSESSMENT:  The calf is not tender to palpation.  No evidence of DVT seen on physical exam.  ROM: not tested       RADIOGRAPHS & DIAGNOSTIC STUDIES     Results for orders placed or performed in visit on 04/23/18   AMB POC XRAY, ANKLE; COMPLETE, 3+ VIE    Narrative    Jennifer Hodgson PA-C     4/23/2018  2:01 PM  X-rays, 5 views of the right ankle ankle and tib/fib reveals post op changes s/p removal of external fixator and ORIF right ankle. Overall alignment looks good. Hardware in good position. Non-operative treatment of fibula shaft fracture with some   healing noted. Pin sites s/p removal of external fixator visible. AMB POC XRAY; TIBIA & FIBULA, TWO VIE    Narrative    Silvia Griffin PA-C     4/23/2018  2:01 PM  X-rays, 5 views of the right ankle ankle and tib/fib reveals post   op changes s/p removal of external fixator and ORIF right ankle. Overall alignment looks good. Hardware in good position. Non-operative treatment of fibula shaft fracture with some   healing noted. Pin sites s/p removal of external fixator visible. IMPRESSION:     Encounter Diagnoses     ICD-10-CM ICD-9-CM   1. Closed fracture of right tibia and fibula with routine healing, subsequent encounter S82.201D V54.16    S82.401D    2. Post-operative state Z98.890 V45.89       PLAN:         Orders Placed This Encounter    [90508] Ankle Min 3V    [36400] Tib/Fib 2V                    Please keep your right lower extremity dry  Continue non-weight bearing  Continue to take aspirin as directed  Take a calcium + vitamin D supplement  Follow up in three weeks or sooner as needed       X-rays of the right ankle and tib/fib at [facility]    Patient has been discussed with Dr. Toya Del Valle during this visit and he agrees with the assessment and plan    Patient expresses understanding of the plan. Patient education provided on post surgical care. REVIEW OF SYSTEMS:     Otherwise as noted in HPI      PAST MEDICAL HISTORY:     Past Medical History:   Diagnosis Date    Hypertension     pt denies this    Seasonal allergic rhinitis        MEDICATIONS:     Current Outpatient Prescriptions   Medication Sig    HYDROcodone-acetaminophen (NORCO) 5-325 mg per tablet One tablet by mouth BID    aspirin (ASPIRIN) 325 mg tablet Take 1 Tab by mouth daily.     polyethylene glycol (MIRALAX) 17 gram packet Take 1 Packet by mouth daily.  promethazine (PHENERGAN) 25 mg tablet Take 1 Tab by mouth every six (6) hours as needed for Nausea. No current facility-administered medications for this visit. ALLERGIES:     No Known Allergies      PAST SURGICAL HISTORY:     Past Surgical History:   Procedure Laterality Date    HX ANKLE FRACTURE TX Right 02/27/2018    HX ORTHOPAEDIC  02/27/2018    External Fixation right ankle       SOCIAL HISTORY:     Social History     Social History    Marital status: SINGLE     Spouse name: N/A    Number of children: N/A    Years of education: N/A     Occupational History    Not on file.      Social History Main Topics    Smoking status: Never Smoker    Smokeless tobacco: Never Used    Alcohol use No    Drug use: No    Sexual activity: Not on file     Other Topics Concern    Not on file     Social History Narrative       FAMILY HISTORY:     Family History   Problem Relation Age of Onset    Hypertension Other     Hypertension Maternal Grandmother            Gina Crane PA-C  4/23/2018

## 2018-04-23 NOTE — MR AVS SNAPSHOT
[address] [place] [address], [address] [facility] 
[phone] Patient: Neyda Ochoa MRN: [id number] :1990 Visit Information Date & Time Provider Department Dept. Phone Encounter #  
 2018  1:00 PM Olivia Palm, [address] [facility] [phone] [id number] Follow-up Instructions Return in about 3 weeks (around 2018) for follow up evaluation. Upcoming Health Maintenance Date Due DTaP/Tdap/Td series (1 - Tdap) 10/10/2011 Influenza Age 5 to Adult 2017 Allergies as of 2018  Review Complete On: 2018 By: Olivia Palm PA-C No Known Allergies Current Immunizations  Never Reviewed No immunizations on file. Not reviewed this visit You Were Diagnosed With   
  
 Codes Comments Closed fracture of right tibia and fibula with routine healing, subsequent encounter    -  Primary ICD-10-CM: S82.201D, S82.401D ICD-9-CM: V54.16 Post-operative state     ICD-10-CM: D78.713 ICD-9-CM: V45.89 Vitals BP Pulse Temp Height(growth percentile) Weight(growth percentile) SpO2  
 124/75 63 96.9 °F (36.1 °C) (Oral) 6' 3\" (1.905 m) 175 lb (79.4 kg) 100% BMI Smoking Status 21.87 kg/m2 Never Smoker BMI and BSA Data Body Mass Index Body Surface Area  
 21.87 kg/m 2 2.05 m 2 Your Updated Medication List  
  
   
This list is accurate as of 18  1:58 PM.  Always use your most recent med list.  
  
  
  
  
 aspirin 325 mg tablet Commonly known as:  ASPIRIN Take 1 Tab by mouth daily. HYDROcodone-acetaminophen 5-325 mg per tablet Commonly known as:  Marc Macias One tablet by mouth BID  
  
 polyethylene glycol 17 gram packet Commonly known as:  Marcellina Dull Take 1 Packet by mouth daily. promethazine 25 mg tablet Commonly known as:  PHENERGAN  
 Take 1 Tab by mouth every six (6) hours as needed for Nausea. We Performed the Following AMB POC XRAY, ANKLE; COMPLETE, 3+ VIE [06634 CPT(R)] AMB POC XRAY; TIBIA & FIBULA, TWO VIE [60451 CPT(R)] Follow-up Instructions Return in about 3 weeks (around 5/14/2018) for follow up evaluation. Patient Instructions Please keep your right lower extremity dry Continue non-weight bearing Continue to take aspirin as directed Take a calcium + vitamin D supplement Follow up in three weeks or sooner as needed Broken Ankle: Care Instructions Your Care Instructions An ankle may break (fracture) during sports, a fall, or other accidents. Fractures can range from a small, hairline crack, to a bone or bones broken into two or more pieces. Your treatment depends on how bad the break is. Your doctor may have put your ankle in a splint or cast to allow it to heal or to keep it stable until you see another doctor. It may take weeks or months for your ankle to heal. You can help your ankle heal with some care at home. You heal best when you take good care of yourself. Eat a variety of healthy foods, and don't smoke. You may have had a sedative to help you relax. You may be unsteady after having sedation. It can take a few hours for the medicine's effects to wear off. Common side effects of sedation include nausea, vomiting, and feeling sleepy or tired. The doctor has checked you carefully, but problems can develop later. If you notice any problems or new symptoms,  get medical treatment right away. Follow-up care is a key part of your treatment and safety. Be sure to make and go to all appointments, and call your doctor if you are having problems. It's also a good idea to know your test results and keep a list of the medicines you take. How can you care for yourself at home? · If the doctor gave you a sedative: ¨ For 24 hours, don't do anything that requires attention to detail. It takes time for the medicine's effects to completely wear off. ¨ For your safety, do not drive or operate any machinery that could be dangerous. Wait until the medicine wears off and you can think clearly and react easily. · Put ice or a cold pack on your ankle for 10 to 20 minutes at a time. Try to do this every 1 to 2 hours for the next 3 days (when you are awake). Put a thin cloth between the ice and your cast or splint. Keep your cast or splint dry. · Follow the cast care instructions your doctor gives you. If you have a splint, do not take it off unless your doctor tells you to. · Be safe with medicines. Take pain medicines exactly as directed. ¨ If the doctor gave you a prescription medicine for pain, take it as prescribed. ¨ If you are not taking a prescription pain medicine, ask your doctor if you can take an over-the-counter medicine. · Prop up your leg on pillows in the first few days after the injury. Keep the ankle higher than the level of your heart. This will help reduce swelling. · Do not put weight on your ankle unless your doctor tells you to. Use crutches to walk. · Follow instructions for exercises to keep your leg strong. · Wiggle your toes often to reduce swelling and stiffness. When should you call for help? Call 911 anytime you think you may need emergency care. For example, call if: 
? · You have chest pain, are short of breath, or you cough up blood. ? · You are very sleepy and you have trouble waking up. ?Call your doctor now or seek immediate medical care if: 
? · You have new or worse nausea or vomiting. ? · You have new or worse pain. ? · Your foot is cool or pale or changes color. ? · You have tingling, weakness, or numbness in your toes. ? · Your cast or splint feels too tight. ? · You have signs of a blood clot in your leg (called a deep vein thrombosis), such as: ¨ Pain in your calf, back of the knee, thigh, or groin. ¨ Redness or swelling in your leg. ? Watch closely for changes in your health, and be sure to contact your doctor if: 
? · You have a problem with your splint or cast.  
? · You do not get better as expected. Where can you learn more? Go to http://tony-iris.info/. Enter P763 in the search box to learn more about \"Broken Ankle: Care Instructions. \" Current as of: March 21, 2017 Content Version: 11.4 © 7675-1112 [email or web address]. Care instructions adapted under license by [employer] (which disclaims liability or warranty for this information). If you have questions about a medical condition or this instruction, always ask your healthcare professional. [address] any warranty or liability for your use of this information. Introducing [facility]! Dear Ramón Collazo: Thank you for requesting a [employer] account. Our records indicate that you already have an active [employer] account. You can access your account anytime at [email or web address] Did you know that you can access your hospital and ER discharge instructions at any time in [employer]? You can also review all of your test results from your hospital stay or ER visit. Additional Information If you have questions, please visit the Frequently Asked Questions section of the [employer] website at [email or web address] Remember, [employer] is NOT to be used for urgent needs. For medical emergencies, dial 911. Now available from your iPhone and Android! Please provide this summary of care documentation to your next provider. Your primary care clinician is listed as NONE. If you have any questions after today's visit, please call [phone].

## 2018-04-23 NOTE — PATIENT INSTRUCTIONS
Please keep your right lower extremity dry  Continue non-weight bearing  Continue to take aspirin as directed  Take a calcium + vitamin D supplement  Follow up in three weeks or sooner as needed         Broken Ankle: Care Instructions  Your Care Instructions    An ankle may break (fracture) during sports, a fall, or other accidents. Fractures can range from a small, hairline crack, to a bone or bones broken into two or more pieces. Your treatment depends on how bad the break is. Your doctor may have put your ankle in a splint or cast to allow it to heal or to keep it stable until you see another doctor. It may take weeks or months for your ankle to heal. You can help your ankle heal with some care at home. You heal best when you take good care of yourself. Eat a variety of healthy foods, and don't smoke. You may have had a sedative to help you relax. You may be unsteady after having sedation. It can take a few hours for the medicine's effects to wear off. Common side effects of sedation include nausea, vomiting, and feeling sleepy or tired. The doctor has checked you carefully, but problems can develop later. If you notice any problems or new symptoms,  get medical treatment right away. Follow-up care is a key part of your treatment and safety. Be sure to make and go to all appointments, and call your doctor if you are having problems. It's also a good idea to know your test results and keep a list of the medicines you take. How can you care for yourself at home? · If the doctor gave you a sedative:  ¨ For 24 hours, don't do anything that requires attention to detail. It takes time for the medicine's effects to completely wear off. ¨ For your safety, do not drive or operate any machinery that could be dangerous. Wait until the medicine wears off and you can think clearly and react easily. · Put ice or a cold pack on your ankle for 10 to 20 minutes at a time.  Try to do this every 1 to 2 hours for the next 3 days (when you are awake). Put a thin cloth between the ice and your cast or splint. Keep your cast or splint dry. · Follow the cast care instructions your doctor gives you. If you have a splint, do not take it off unless your doctor tells you to. · Be safe with medicines. Take pain medicines exactly as directed. ¨ If the doctor gave you a prescription medicine for pain, take it as prescribed. ¨ If you are not taking a prescription pain medicine, ask your doctor if you can take an over-the-counter medicine. · Prop up your leg on pillows in the first few days after the injury. Keep the ankle higher than the level of your heart. This will help reduce swelling. · Do not put weight on your ankle unless your doctor tells you to. Use crutches to walk. · Follow instructions for exercises to keep your leg strong. · Wiggle your toes often to reduce swelling and stiffness. When should you call for help? Call 911 anytime you think you may need emergency care. For example, call if:  ? · You have chest pain, are short of breath, or you cough up blood. ? · You are very sleepy and you have trouble waking up. ?Call your doctor now or seek immediate medical care if:  ? · You have new or worse nausea or vomiting. ? · You have new or worse pain. ? · Your foot is cool or pale or changes color. ? · You have tingling, weakness, or numbness in your toes. ? · Your cast or splint feels too tight. ? · You have signs of a blood clot in your leg (called a deep vein thrombosis), such as:  ¨ Pain in your calf, back of the knee, thigh, or groin. ¨ Redness or swelling in your leg. ? Watch closely for changes in your health, and be sure to contact your doctor if:  ? · You have a problem with your splint or cast.   ? · You do not get better as expected. Where can you learn more? Go to http://tony-iris.info/.   Enter P763 in the search box to learn more about \"Broken Ankle: Care Instructions. \"  Current as of: March 21, 2017  Content Version: 11.4  © 8176-4054 Healthwise, [facility]. Care instructions adapted under license by [employer] (which disclaims liability or warranty for this information). If you have questions about a medical condition or this instruction, always ask your healthcare professional. [place] [address] any warranty or liability for your use of this information.

## 2018-04-23 NOTE — PROCEDURES
X-rays, 5 views of the right ankle ankle and tib/fib reveals post op changes s/p removal of external fixator and ORIF right ankle. Overall alignment looks good. Hardware in good position. Non-operative treatment of fibula shaft fracture with some healing noted. Pin sites s/p removal of external fixator visible.

## 2018-04-25 DIAGNOSIS — S82.851D CLOSED TRIMALLEOLAR FRACTURE OF RIGHT ANKLE WITH ROUTINE HEALING, SUBSEQUENT ENCOUNTER: ICD-10-CM

## 2018-04-25 RX ORDER — HYDROCODONE BITARTRATE AND ACETAMINOPHEN 5; 325 MG/1; MG/1
TABLET ORAL
Qty: 20 TAB | Refills: 0 | Status: CANCELLED | OUTPATIENT
Start: 2018-04-25

## 2018-04-25 RX ORDER — CELECOXIB 200 MG/1
200 CAPSULE ORAL
Qty: 30 CAP | Refills: 2 | Status: SHIPPED | OUTPATIENT
Start: 2018-04-25

## 2018-04-25 NOTE — TELEPHONE ENCOUNTER
Patient notified that we are unable to refill Norco.  Rx for Celebrex has been written and is at the  at the [facility] location, ready for .

## 2018-04-25 NOTE — TELEPHONE ENCOUNTER
Rx for Barbie Guillermo has been denied. Rx for Celebrex has been signed into patient chart.     [address] Carlton Donovan PA-C  4/25/2018   3:47 PM

## 2018-05-21 ENCOUNTER — OFFICE VISIT (OUTPATIENT)
Dept: ORTHOPEDIC SURGERY | Age: 28
End: 2018-05-21

## 2018-05-21 VITALS
HEIGHT: 75 IN | SYSTOLIC BLOOD PRESSURE: 144 MMHG | TEMPERATURE: 96.5 F | RESPIRATION RATE: 16 BRPM | OXYGEN SATURATION: 99 % | DIASTOLIC BLOOD PRESSURE: 90 MMHG | HEART RATE: 58 BPM

## 2018-05-21 DIAGNOSIS — S82.851D CLOSED TRIMALLEOLAR FRACTURE OF RIGHT ANKLE WITH ROUTINE HEALING, SUBSEQUENT ENCOUNTER: ICD-10-CM

## 2018-05-21 DIAGNOSIS — T81.40XD POSTOPERATIVE INFECTION, SUBSEQUENT ENCOUNTER: Primary | ICD-10-CM

## 2018-05-21 DIAGNOSIS — S91.301A OPEN WOUND OF RIGHT FOOT EXCLUDING TOES WITHOUT COMPLICATION, INITIAL ENCOUNTER: ICD-10-CM

## 2018-05-21 RX ORDER — CEPHALEXIN 500 MG/1
500 CAPSULE ORAL 4 TIMES DAILY
Qty: 28 CAP | Refills: 0 | Status: SHIPPED | OUTPATIENT
Start: 2018-05-21 | End: 2018-05-28

## 2018-05-21 NOTE — PROGRESS NOTES
Patient: Randy Gross                MRN: [id number]       SSN: [id number]  YOB: 1990            AGE: 32 y.o. SEX: male    PCP:None    POST OP OFFICE NOTE  DOS: 3/14/18    Chief Complaint:   Chief Complaint   Patient presents with    Leg Pain     right leg pain/injury        HPI:     The patient is a 32 y.o. male who presents today for follow up 76 days s/p removal of external fixator, Open reduction internal fixation right ankle. Patient has been NWB to the right lower extremity. Patient reports no pain. Patient denies any fever, chills, chest pain, shortness of breath or calf pain. There are no new illness or injuries to report since last seen in the office. Patient is on ASA  for DVT prophylaxis. Patient states that he thinks his cast got wet in the rain. PHYSICAL EXAM:     Visit Vitals    /90    Pulse (!) 58    Temp 96.5 °F (35.8 °C) (Oral)    Resp 16    Ht 6' 3\" (1.905 m)    SpO2 99%         Pain Scale: 0/10    GEN:  Alert, well developed, well nourished, well appearing 32 y.o. male in no acute distress. PSYCH:  Normal affect, mood, and conduct. alert, oriented x 3 alert, oriented x 3, no dementia  M/S EXAMINATION OF: right foot/ankle  DRESSINGS: Cast and dressings saturated and excessively soiled  INCISION: incision is well healed  SKIN: mild edema , no erythema, mild  ecchymosis, no warmth, dry skin. There is one small area on dorsal ankle with friable granulation tissue. Patient states scab was disrupted upon cast removal. Area was cleansed and dressed in the office. Patient provided with supplies in the office for cleaning and dressing changes  TENDERNESS:  mild tenderness to palpation   NEUROVASCULAR:  grossly intact. Positive distal pulses and capillary refill. DVT ASSESSMENT:  The calf is not tender to palpation.  No evidence of DVT seen on physical exam.  ROM: not tested       RADIOGRAPHS & DIAGNOSTIC STUDIES     Results for orders placed or performed in visit on 05/21/18   AMB POC XRAY, ANKLE; COMPLETE, 3+ VIE    Narrative    Yeimi Ramírez PA-C     5/21/2018 11:22 AM  X-rays, 6 views of the right ankle ankle and tib/fib reveals post   op changes s/p removal of external fixator and ORIF right ankle. Overall alignment looks good. Hardware in good position. Non-operative treatment of fibula shaft fracture. AMB POC XRAY; TIBIA & FIBULA, TWO VIE    Narrative    Yeimi Ramírez PA-C     5/21/2018 11:22 AM  X-rays, 6 views of the right ankle ankle and tib/fib reveals post   op changes s/p removal of external fixator and ORIF right ankle. Overall alignment looks good. Hardware in good position. Non-operative treatment of fibula shaft fracture. IMPRESSION:     Encounter Diagnoses     ICD-10-CM ICD-9-CM   1. Postoperative infection, subsequent encounter T81. 4XXD V58.89     998.59   2. Open wound of right foot excluding toes without complication, initial encounter S91.301A 892.0   3. Closed trimalleolar fracture of right ankle with routine healing, subsequent encounter S82.851D V54.19       PLAN:         Orders Placed This Encounter    AMB SUPPLY ORDER    [88596] Ankle Min 3V    [26280] Tib/Fib 2V    cephALEXin (KEFLEX) 500 mg capsule              CAM boot provided in the office today. Please wear the CAM boot as instructed. DO NOT APPLY WEIGHT to right lower extremity  Please  Antibiotic Rx from [facility] on [address]. Please take this medication as instructed.   Remove boot while at home in a protected environment to allow foot to air    Please perform daily wound care as instructed:    · Spray with Dermal  and blot  · Apply Telfa   · Apply 4x4  · Apply Kerlix  · Apply ACE bandage    PLEASE KEEP THE FOOT DRY    Follow up in the office in 1 week or sooner as needed       Patient has been examined and evaluated by Dr. Yolie Reyes during this visit and he agrees with the assessment and plan    Patient expresses understanding of the plan. Patient education provided on post surgical care. REVIEW OF SYSTEMS:     Otherwise as noted in HPI      PAST MEDICAL HISTORY:     Past Medical History:   Diagnosis Date    Hypertension     pt denies this    Seasonal allergic rhinitis        MEDICATIONS:     Current Outpatient Prescriptions   Medication Sig    cephALEXin (KEFLEX) 500 mg capsule Take 1 Cap by mouth four (4) times daily for 7 days. Indications: Skin and Skin Structure Infection    aspirin (ASPIRIN) 325 mg tablet Take 1 Tab by mouth daily.  celecoxib (CELEBREX) 200 mg capsule Take 1 Cap by mouth daily (with breakfast).  HYDROcodone-acetaminophen (NORCO) 5-325 mg per tablet One tablet by mouth BID    polyethylene glycol (MIRALAX) 17 gram packet Take 1 Packet by mouth daily.  promethazine (PHENERGAN) 25 mg tablet Take 1 Tab by mouth every six (6) hours as needed for Nausea. No current facility-administered medications for this visit. ALLERGIES:     No Known Allergies      PAST SURGICAL HISTORY:     Past Surgical History:   Procedure Laterality Date    HX ANKLE FRACTURE TX Right 02/27/2018    HX ORTHOPAEDIC  02/27/2018    External Fixation right ankle       SOCIAL HISTORY:     Social History     Social History    Marital status: SINGLE     Spouse name: N/A    Number of children: N/A    Years of education: N/A     Occupational History    Not on file.      Social History Main Topics    Smoking status: Never Smoker    Smokeless tobacco: Never Used    Alcohol use No    Drug use: No    Sexual activity: Not on file     Other Topics Concern    Not on file     Social History Narrative       FAMILY HISTORY:     Family History   Problem Relation Age of Onset    Hypertension Other     Hypertension Maternal Grandmother            Mustapha Jorge PA-C  5/21/2018

## 2018-05-21 NOTE — MR AVS SNAPSHOT
[address] [place] [address], [address] [facility] 
[phone] Patient: Cruz George MRN: [id number] :1990 Visit Information Date & Time Provider Department Dept. Phone Encounter #  
 2018 10:15 AM Denia Donovan, [facility] [phone] [id number] Follow-up Instructions Return in about 1 week (around 2018) for follow up evaluation, incision check. Upcoming Health Maintenance Date Due DTaP/Tdap/Td series (1 - Tdap) 10/10/2011 Influenza Age 5 to Adult 2018 Allergies as of 2018  Review Complete On: 2018 By: Denia Donovan PA-C No Known Allergies Current Immunizations  Never Reviewed No immunizations on file. Not reviewed this visit You Were Diagnosed With   
  
 Codes Comments Postoperative infection, subsequent encounter    -  Primary ICD-10-CM: T81. 4XXD ICD-9-CM: V58.89, 998.59 Open wound of right foot excluding toes without complication, initial encounter     ICD-10-CM: S91.301A ICD-9-CM: 892.0 Closed trimalleolar fracture of right ankle with routine healing, subsequent encounter     ICD-10-CM: S82.851D ICD-9-CM: V54.19 Vitals BP Pulse Temp Resp Height(growth percentile) SpO2  
 144/90 (!) 58 96.5 °F (35.8 °C) (Oral) 16 6' 3\" (1.905 m) 99% Smoking Status Never Smoker Preferred Pharmacy Pharmacy Name Phone [facility] [address]. [phone] Your Updated Medication List  
  
   
This list is accurate as of 18 11:09 AM.  Always use your most recent med list.  
  
  
  
  
 aspirin 325 mg tablet Commonly known as:  ASPIRIN Take 1 Tab by mouth daily. celecoxib 200 mg capsule Commonly known as:  CeleBREX Take 1 Cap by mouth daily (with breakfast). cephALEXin 500 mg capsule Commonly known as:  Lorenda Darting Take 1 Cap by mouth four (4) times daily for 7 days. Indications: Skin and Skin Structure Infection HYDROcodone-acetaminophen 5-325 mg per tablet Commonly known as:  Anton Ricky One tablet by mouth BID  
  
 polyethylene glycol 17 gram packet Commonly known as:  Blaze Froylan Take 1 Packet by mouth daily. promethazine 25 mg tablet Commonly known as:  PHENERGAN Take 1 Tab by mouth every six (6) hours as needed for Nausea. Prescriptions Sent to Pharmacy Refills  
 cephALEXin (KEFLEX) 500 mg capsule 0 Sig: Take 1 Cap by mouth four (4) times daily for 7 days. Indications: Skin and Skin Structure Infection Class: Normal  
 Pharmacy: [address]  #: [phone] Route: Oral  
  
We Performed the Following AMB POC XRAY, ANKLE; COMPLETE, 3+ VIE [56335 CPT(R)] AMB POC XRAY; TIBIA & FIBULA, TWO VIE [93403 CPT(R)] AMB SUPPLY ORDER [[id number] Custom] Comments:  
 Tall Right CAM walker boot. Order Date: 5/21/2018 Follow-up Instructions Return in about 1 week (around 5/28/2018) for follow up evaluation, incision check. Patient Instructions CAM boot provided in the office today. Please wear the CAM boot as instructed. DO NOT APPLY WEIGHT to right lower extremity Please  Antibiotic Rx from [facility] on [facility]. Please take this medication as instructed. Remove boot while at home in a protected environment to allow foot to air Please perform daily wound care as instructed: · Spray with Dermal  and blot · Apply Telfa · Apply 4x4 · Apply Kerlix · Apply ACE bandage PLEASE KEEP THE FOOT DRY Follow up in the office in 1 week or sooner as needed Wound Care: After Your Visit Your Care Instructions Taking good care of your wound at home will help it heal quickly and reduce your chance of infection. The doctor has checked you carefully, but problems can develop later. If you notice any problems or new symptoms, get medical treatment right away. Follow-up care is a key part of your treatment and safety. Be sure to make and go to all appointments, and call your doctor if you are having problems. It's also a good idea to know your test results and keep a list of the medicines you take. How can you care for yourself at home? · Clean the area with soap and water 2 times a day unless your doctor gives you different instructions. Don't use hydrogen peroxide or alcohol, which can slow healing. ¨ You may cover the wound with a thin layer of antibiotic ointment, such as bacitracin, and a nonstick bandage. ¨ Apply more ointment and replace the bandage as needed. · Take pain medicines exactly as directed. Some pain is normal with a wound, but do not ignore pain that is getting worse instead of better. You could have an infection. ¨ If the doctor gave you a prescription medicine for pain, take it as prescribed. ¨ If you are not taking a prescription pain medicine, ask your doctor if you can take an over-the-counter medicine. · Your doctor may have closed your wound with stitches (sutures), staples, or skin glue. ¨ If you have stitches, your doctor may remove them after several days to 2 weeks. Or you may have stitches that dissolve on their own. ¨ If you have staples, your doctor may remove them after 7 to 10 days. ¨ If your wound was closed with skin glue, the glue will wear off in a few days to 2 weeks. When should you call for help? Call your doctor now or seek immediate medical care if: 
· You have signs of infection, such as: 
¨ Increased pain, swelling, warmth, or redness near the wound. ¨ Red streaks leading from the wound. ¨ Pus draining from the wound. ¨ A fever. · You bleed so much from your incision that you soak one or more bandages over 2 to 4 hours. Watch closely for changes in your health, and be sure to contact your doctor if: · The wound is not getting better each day. Where can you learn more? Go to Ciplex.be Enter T403 in the search box to learn more about \"Wound Care: After Your Visit. \"  
© 1819-8808 Healthwise, Incorporated. Care instructions adapted under license by [facility] [employer] (which disclaims liability or warranty for this information). This care instruction is for use with your licensed healthcare professional. If you have questions about a medical condition or this instruction, always ask your healthcare professional. [address] any warranty or liability for your use of this information. Content Version: 98.2.812164; Last Revised: April 23, 2012 Introducing [facility] HE[facility] SERVICES! Dear Patricia Robbins: Thank you for requesting a Timeet account. Our records indicate that you already have an active Timeet account. You can access your account anytime at [email or web address] Did you know that you can access your hospital and ER discharge instructions at any time in Timeet? You can also review all of your test results from your hospital stay or ER visit. Additional Information If you have questions, please visit the Frequently Asked Questions section of the Timeet website at [email or web address] Remember, Timeet is NOT to be used for urgent needs. For medical emergencies, dial 911. Now available from your iPhone and Android! Please provide this summary of care documentation to your next provider. Your primary care clinician is listed as NONE. If you have any questions after today's visit, please call [phone].

## 2018-05-21 NOTE — PROCEDURES
X-rays, 6 views of the right ankle ankle and tib/fib reveals post op changes s/p removal of external fixator and ORIF right ankle. Overall alignment looks good. Hardware in good position. Non-operative treatment of fibula shaft fracture.

## 2018-05-21 NOTE — PATIENT INSTRUCTIONS
CAM boot provided in the office today. Please wear the CAM boot as instructed. DO NOT APPLY WEIGHT to right lower extremity  Please  Antibiotic Rx from [facility] on [address]. Please take this medication as instructed. Remove boot while at home in a protected environment to allow foot to air    Please perform daily wound care as instructed:    · Spray with Dermal  and blot  · Apply Telfa   · Apply 4x4  · Apply Kerlix  · Apply ACE bandage    PLEASE KEEP THE FOOT DRY    Follow up in the office in 1 week or sooner as needed        Wound Care: After Your Visit  Your Care Instructions  Taking good care of your wound at home will help it heal quickly and reduce your chance of infection. The doctor has checked you carefully, but problems can develop later. If you notice any problems or new symptoms, get medical treatment right away. Follow-up care is a key part of your treatment and safety. Be sure to make and go to all appointments, and call your doctor if you are having problems. It's also a good idea to know your test results and keep a list of the medicines you take. How can you care for yourself at home? · Clean the area with soap and water 2 times a day unless your doctor gives you different instructions. Don't use hydrogen peroxide or alcohol, which can slow healing. ¨ You may cover the wound with a thin layer of antibiotic ointment, such as bacitracin, and a nonstick bandage. ¨ Apply more ointment and replace the bandage as needed. · Take pain medicines exactly as directed. Some pain is normal with a wound, but do not ignore pain that is getting worse instead of better. You could have an infection. ¨ If the doctor gave you a prescription medicine for pain, take it as prescribed. ¨ If you are not taking a prescription pain medicine, ask your doctor if you can take an over-the-counter medicine.   · Your doctor may have closed your wound with stitches (sutures), staples, or skin glue.  ¨ If you have stitches, your doctor may remove them after several days to 2 weeks. Or you may have stitches that dissolve on their own. ¨ If you have staples, your doctor may remove them after 7 to 10 days. ¨ If your wound was closed with skin glue, the glue will wear off in a few days to 2 weeks. When should you call for help? Call your doctor now or seek immediate medical care if:  · You have signs of infection, such as:  ¨ Increased pain, swelling, warmth, or redness near the wound. ¨ Red streaks leading from the wound. ¨ Pus draining from the wound. ¨ A fever. · You bleed so much from your incision that you soak one or more bandages over 2 to 4 hours. Watch closely for changes in your health, and be sure to contact your doctor if:  · The wound is not getting better each day. Where can you learn more? Go to GameGround.be  Enter M973 in the search box to learn more about \"Wound Care: After Your Visit. \"   © 7410-7988 Healthwise, Incorporated. Care instructions adapted under license by [facility] (which disclaims liability or warranty for this information). This care instruction is for use with your licensed healthcare professional. If you have questions about a medical condition or this instruction, always ask your healthcare professional. [address] any warranty or liability for your use of this information. Content Version: 25.4.125786;  Last Revised: April 23, 2012

## 2018-06-21 ENCOUNTER — OFFICE VISIT (OUTPATIENT)
Dept: ORTHOPEDIC SURGERY | Age: 28
End: 2018-06-21

## 2018-06-21 VITALS
WEIGHT: 189 LBS | BODY MASS INDEX: 23.5 KG/M2 | RESPIRATION RATE: 16 BRPM | DIASTOLIC BLOOD PRESSURE: 91 MMHG | TEMPERATURE: 95.7 F | SYSTOLIC BLOOD PRESSURE: 144 MMHG | HEIGHT: 75 IN | HEART RATE: 54 BPM | OXYGEN SATURATION: 100 %

## 2018-06-21 DIAGNOSIS — Z91.199 NON-COMPLIANT PATIENT: ICD-10-CM

## 2018-06-21 DIAGNOSIS — S82.851D CLOSED TRIMALLEOLAR FRACTURE OF RIGHT ANKLE WITH ROUTINE HEALING, SUBSEQUENT ENCOUNTER: Primary | ICD-10-CM

## 2018-06-21 DIAGNOSIS — Z98.890 POST-OPERATIVE STATE: ICD-10-CM

## 2018-06-21 DIAGNOSIS — S82.201D CLOSED FRACTURE OF RIGHT TIBIA AND FIBULA WITH ROUTINE HEALING, SUBSEQUENT ENCOUNTER: ICD-10-CM

## 2018-06-21 DIAGNOSIS — S82.401D CLOSED FRACTURE OF RIGHT TIBIA AND FIBULA WITH ROUTINE HEALING, SUBSEQUENT ENCOUNTER: ICD-10-CM

## 2018-06-21 NOTE — PATIENT INSTRUCTIONS
WBAT in CAM boot with transition to [facility] brace in supportive shoe  Order provided for formal PT  Follow up in 4 weeks or sooner as needed           Ankle Fracture: Rehab Exercises  Your Care Instructions  Here are some examples of typical rehabilitation exercises for your condition. Start each exercise slowly. Ease off the exercise if you start to have pain. Your doctor or physical therapist will tell you when you can start these exercises and which ones will work best for you. How to do the exercises  Calf stretch (knee straight)    For this exercise, you will need a towel. 1. Sit with your affected leg straight and supported on the floor. Your other leg should be bent, with that foot flat on the floor. 2. Place a towel around your affected foot just under the toes. 3. Hold one end of the towel in each hand, with your hands above your knees. 4. Pull back gently with the towel so that your foot stretches toward you. 5. Hold the position for at least 15 to 30 seconds. 6. Repeat 2 to 4 times a session, up to 5 sessions a day. Calf stretch (knee bent)    For this exercise, you will need a towel. You will also need a pillow or foam roll. 1. Sit with your affected leg straight and supported on the floor. Your other leg should be bent, with that foot flat on the floor. 2. Place a pillow or foam roll under your affected leg. 3. Place a towel around your affected foot just under the toes. 4. Hold one end of the towel in each hand, with your hands above your knees. 5. Pull back gently with the towel so that your foot stretches toward you. 6. Hold the position for at least 15 to 30 seconds. 7. Repeat 2 to 4 times a session, up to 5 sessions a day. Ankle plantar flexion    1. Sit with your affected leg straight and supported on the floor. Your other leg should be bent, with that foot flat on the floor.   2. Keeping your affected leg straight, gently flex your foot downward so your toes are pointed away from your body. Then slowly relax your foot to the starting position. 3. Repeat 8 to 12 times. Ankle dorsiflexion    1. Sit with your affected leg straight and supported on the floor. Your other leg should be bent, with that foot flat on the floor. 2. Keeping your affected leg straight, gently flex your foot back toward your body so your toes point upward. Then slowly relax your foot to the starting position. 3. Repeat 8 to 12 times. Resisted ankle plantar flexion    For the next four exercises, you will need elastic exercise material, such as surgical tubing or Thera-Band. 1. Sit with your affected leg straight and supported on the floor. Your other leg should be bent, with that foot flat on the floor. 2. Place an elastic band around your affected foot just under the toes. 3. Hold each end of the band in each hand, with your hands above your knees. 4. Keeping your affected leg straight, gently flex your foot downward so your toes are pointed away from your body. Then slowly relax your foot to the starting position. 5. Repeat 8 to 12 times. Resisted ankle dorsiflexion    1. Tie the ends of an exercise band together to form a loop. Attach one end of the loop to a secure object, like a table leg, or shut a door on it to hold it in place. (Or you can have someone hold one end of the loop to provide resistance.)  2. While sitting on the floor or in a chair, loop the other end of the band over the top of your affected foot. 3. Keeping your knee and leg straight, slowly flex your foot toward you to pull back on the exercise band, and then slowly relax. 4. Repeat 8 to 12 times. Resisted ankle inversion    1. Sit on the floor with your good leg crossed over your other leg. 2. Hold both ends of an exercise band and loop the band around the inside of your affected foot. Then press your good foot against the band.   3. Keeping your legs crossed, slowly push your affected foot against the band so that foot moves away from your good foot. Then slowly relax. 4. Repeat 8 to 12 times. Resisted ankle eversion    1. Sit on the floor with your legs straight. 2. Hold both ends of an exercise band and loop the band around the outside of your affected foot. Then press your good foot against the band. 3. Keeping your leg straight, slowly push your affected foot outward against the band and away from your good foot without letting your leg rotate. Then slowly relax. 4. Repeat 8 to 12 times. Ankle alphabet    1. Sit in a chair with your feet flat on the floor. (You can also do this exercise lying on your back with your affected leg propped up on a pillow). 2. Lift the heel of your affected foot off the floor, and slowly trace the letters of the alphabet. Heel raises    1. Stand with your feet a few inches apart, with your hands lightly resting on a counter or chair in front of you. 2. Slowly raise your heels off the floor while keeping your knees straight. 3. Hold for about 6 seconds, then slowly lower your heels to the floor. 4. Do 8 to 12 repetitions several times during the day. Follow-up care is a key part of your treatment and safety. Be sure to make and go to all appointments, and call your doctor if you are having problems. It's also a good idea to know your test results and keep a list of the medicines you take. Where can you learn more? Go to http://tony-iris.info/. Enter T800 in the search box to learn more about \"Ankle Fracture: Rehab Exercises. \"  Current as of: March 21, 2017  Content Version: 11.4  © 1321-1927 Healthwise, [employer]. Care instructions adapted under license by [employer] (which disclaims liability or warranty for this information). If you have questions about a medical condition or this instruction, always ask your healthcare professional. [address] any warranty or liability for your use of this information.

## 2018-06-21 NOTE — PROGRESS NOTES
Patient: Porter Ram                MRN: [id number]       SSN: [id number]  YOB: 1990            AGE: 32 y.o. SEX: male    PCP:None    POST OP OFFICE NOTE  DOS: 3/14/18    Chief Complaint:   Chief Complaint   Patient presents with    Follow-up     Right ankle follow up       HPI:     The patient is a 32 y.o. male who presents today for follow up 99 days s/p removal of external fixator, Open reduction internal fixation right ankle. Patient has been WB to the right lower extremity AMA. Patient reports no pain. Patient denies any fever, chills, chest pain, shortness of breath or calf pain. There are no new illness or injuries to report since last seen in the office. Patient was prescribed ABX and provided wound care instructions at [address] [place] [address] and instructed to f/u in 1 week (on 5/28 for wound check). Patient did not f/u as ordered, but states that he completed ABX and has been wearing the CAM boot. PHYSICAL EXAM:     Visit Vitals    BP (!) 144/91 (BP 1 Location: Right arm, BP Patient Position: Sitting)    Pulse (!) 54    Temp 95.7 °F (35.4 °C) (Oral)    Resp 16    Ht 6' 3\" (1.905 m)    Wt 189 lb (85.7 kg)    SpO2 100%    BMI 23.62 kg/m2         Pain Scale: 0/10    GEN:  Alert, well developed, well nourished, well appearing 32 y.o. male in no acute distress. PSYCH:  Normal affect, mood, and conduct. alert, oriented x 3 alert, oriented x 3, no dementia  M/S EXAMINATION OF: right foot/ankle  INCISION: incision is well healed  SKIN: mild edema , no erythema, no  ecchymosis, no warmth, dry skin. The small area on dorsal ankle is healing nicely  TENDERNESS:  mild tenderness to palpation   NEUROVASCULAR:  grossly intact. Positive distal pulses and capillary refill. DVT ASSESSMENT:  The calf is not tender to palpation.  No evidence of DVT seen on physical exam.  ROM: improving       RADIOGRAPHS & DIAGNOSTIC STUDIES     Results for orders placed or performed in visit on 06/21/18 POC XRAY, ANKLE; 2 VIEWS    Narrative    Yeimi Ramírez PA-C     6/21/2018 12:09 PM  X-rays, 6 views of the right ankle ankle and tib/fib reveals post   op changes s/p removal of external fixator and ORIF right ankle. Overall alignment looks good. Hardware in good position.    Non-operative treatment of fibula shaft fracture that shows some   healing, but no complete healing noted at this time. AMB POC XRAY; TIBIA & FIBULA, TWO VIE    Narrative    Yeimi Ramírez PA-C     6/21/2018 12:09 PM  X-rays, 6 views of the right ankle ankle and tib/fib reveals post   op changes s/p removal of external fixator and ORIF right ankle. Overall alignment looks good. Hardware in good position.    Non-operative treatment of fibula shaft fracture that shows some   healing, but no complete healing noted at this time. IMPRESSION:     Encounter Diagnoses     ICD-10-CM ICD-9-CM   1. Closed trimalleolar fracture of right ankle with routine healing, subsequent encounter S82.851D V54.19   2. Closed fracture of right tibia and fibula with routine healing, subsequent encounter S82.201D V54.16    S82.401D    3. Post-operative state Z98.890 V45.89   4. Non-compliant patient Z91.19 V15.81       PLAN:         Orders Placed This Encounter    Generic Supply Order    [61208] Ankle 2V    [16631] Tib/Fib 2V    REFERRAL TO PHYSICAL THERAPY            WBAT in CAM boot with transition to ASAC brace in supportive shoe (at home, wear CAM boot in public)  Order formal PT to begin at ROM  Follow up in 4 weeks or sooner as needed          REVIEW OF SYSTEMS:     Otherwise as noted in HPI      PAST MEDICAL HISTORY:     Past Medical History:   Diagnosis Date    Hypertension     pt denies this    Seasonal allergic rhinitis        MEDICATIONS:     Current Outpatient Prescriptions   Medication Sig    celecoxib (CELEBREX) 200 mg capsule Take 1 Cap by mouth daily (with breakfast).     HYDROcodone-acetaminophen (NORCO) 5-325 mg per tablet One tablet by mouth BID    polyethylene glycol (MIRALAX) 17 gram packet Take 1 Packet by mouth daily.  promethazine (PHENERGAN) 25 mg tablet Take 1 Tab by mouth every six (6) hours as needed for Nausea.  aspirin (ASPIRIN) 325 mg tablet Take 1 Tab by mouth daily. No current facility-administered medications for this visit. ALLERGIES:     No Known Allergies      PAST SURGICAL HISTORY:     Past Surgical History:   Procedure Laterality Date    HX ANKLE FRACTURE TX Right 02/27/2018    HX ORTHOPAEDIC  02/27/2018    External Fixation right ankle       SOCIAL HISTORY:     Social History     Social History    Marital status: SINGLE     Spouse name: N/A    Number of children: N/A    Years of education: N/A     Occupational History    Not on file.      Social History Main Topics    Smoking status: Never Smoker    Smokeless tobacco: Never Used    Alcohol use No    Drug use: No    Sexual activity: Not on file     Other Topics Concern    Not on file     Social History Narrative       FAMILY HISTORY:     Family History   Problem Relation Age of Onset    Hypertension Other     Hypertension Maternal Grandmother            Epifanio Fernandez PA-C  6/21/2018

## 2018-06-21 NOTE — MR AVS SNAPSHOT
[address] [place] [address], [address] [facility] 
[phone] Patient: Yusuf Nelson MRN: [id number] :1990 Visit Information Date & Time Provider Department Dept. Phone Encounter #  
 2018 11:00 AM Katharina Plunkett, [address] [facility] [phone] [id number] Follow-up Instructions Return in about 6 weeks (around 2018) for follow up evaluation. Upcoming Health Maintenance Date Due DTaP/Tdap/Td series (1 - Tdap) 10/10/2011 Influenza Age 5 to Adult 2018 Allergies as of 2018  Review Complete On: 2018 By: Katharina Plunkett PA-C No Known Allergies Current Immunizations  Never Reviewed No immunizations on file. Not reviewed this visit You Were Diagnosed With   
  
 Codes Comments Closed trimalleolar fracture of right ankle with routine healing, subsequent encounter    -  Primary ICD-10-CM: H69.171U ICD-9-CM: V54.19 Closed fracture of right tibia and fibula with routine healing, subsequent encounter     ICD-10-CM: S82.201D, S82.401D ICD-9-CM: V54.16 Post-operative state     ICD-10-CM: O39.779 ICD-9-CM: V45.89 Non-compliant patient     ICD-10-CM: Z91.19 ICD-9-CM: V15.81 Vitals BP Pulse Temp Resp Height(growth percentile) Weight(growth percentile) (!) 144/91 (BP 1 Location: Right arm, BP Patient Position: Sitting) (!) 54 95.7 °F (35.4 °C) (Oral) 16 6' 3\" (1.905 m) 189 lb (85.7 kg) SpO2 BMI Smoking Status 100% 23.62 kg/m2 Never Smoker BMI and BSA Data Body Mass Index Body Surface Area  
 23.62 kg/m 2 2.13 m 2 Preferred Pharmacy Pharmacy Name Phone [address] [facility]. [phone] Your Updated Medication List  
  
   
This list is accurate as of 18 11:51 AM.  Always use your most recent med list.  
  
  
  
  
 aspirin 325 mg tablet Commonly known as:  ASPIRIN Take 1 Tab by mouth daily. celecoxib 200 mg capsule Commonly known as:  CeleBREX Take 1 Cap by mouth daily (with breakfast). HYDROcodone-acetaminophen 5-325 mg per tablet Commonly known as:  Rhenda Verde One tablet by mouth BID  
  
 polyethylene glycol 17 gram packet Commonly known as:  Unique Mule Take 1 Packet by mouth daily. promethazine 25 mg tablet Commonly known as:  PHENERGAN Take 1 Tab by mouth every six (6) hours as needed for Nausea. We Performed the Following AMB SUPPLY ORDER [[id number] Custom] Comments:  
 6/21/2018 11:50 AM 
 
Air Sport Air Cast brace, right REFERRAL TO PHYSICAL THERAPY [MUR66 Custom] Comments:  
 6/21/2018 11:46 AM 
 
Evaluate and treat s/p right trimalleolar ankle fracture and proximal fibula fracture 2-3 x per week for 4-6 weeks Follow-up Instructions Return in about 6 weeks (around 8/2/2018) for follow up evaluation. Referral Information Referral ID Referred By Referred To  
  
 [id number] Tono MINER Not Available Visits Status Start Date End Date 1 New Request 6/21/18 6/21/19 If your referral has a status of pending review or denied, additional information will be sent to support the outcome of this decision. Patient Instructions WBAT in CAM boot with transition to ASAC brace in supportive shoe Order provided for formal PT Follow up in 4 weeks or sooner as needed Ankle Fracture: Rehab Exercises Your Care Instructions Here are some examples of typical rehabilitation exercises for your condition. Start each exercise slowly. Ease off the exercise if you start to have pain. Your doctor or physical therapist will tell you when you can start these exercises and which ones will work best for you. How to do the exercises Calf stretch (knee straight) For this exercise, you will need a towel. 1. Sit with your affected leg straight and supported on the floor. Your other leg should be bent, with that foot flat on the floor. 2. Place a towel around your affected foot just under the toes. 3. Hold one end of the towel in each hand, with your hands above your knees. 4. Pull back gently with the towel so that your foot stretches toward you. 5. Hold the position for at least 15 to 30 seconds. 6. Repeat 2 to 4 times a session, up to 5 sessions a day. Calf stretch (knee bent) For this exercise, you will need a towel. You will also need a pillow or foam roll. 1. Sit with your affected leg straight and supported on the floor. Your other leg should be bent, with that foot flat on the floor. 2. Place a pillow or foam roll under your affected leg. 3. Place a towel around your affected foot just under the toes. 4. Hold one end of the towel in each hand, with your hands above your knees. 5. Pull back gently with the towel so that your foot stretches toward you. 6. Hold the position for at least 15 to 30 seconds. 7. Repeat 2 to 4 times a session, up to 5 sessions a day. Ankle plantar flexion 1. Sit with your affected leg straight and supported on the floor. Your other leg should be bent, with that foot flat on the floor. 2. Keeping your affected leg straight, gently flex your foot downward so your toes are pointed away from your body. Then slowly relax your foot to the starting position. 3. Repeat 8 to 12 times. Ankle dorsiflexion 1. Sit with your affected leg straight and supported on the floor. Your other leg should be bent, with that foot flat on the floor. 2. Keeping your affected leg straight, gently flex your foot back toward your body so your toes point upward. Then slowly relax your foot to the starting position. 3. Repeat 8 to 12 times. Resisted ankle plantar flexion For the next four exercises, you will need elastic exercise material, such as surgical tubing or Thera-Band. 1. Sit with your affected leg straight and supported on the floor. Your other leg should be bent, with that foot flat on the floor. 2. Place an elastic band around your affected foot just under the toes. 3. Hold each end of the band in each hand, with your hands above your knees. 4. Keeping your affected leg straight, gently flex your foot downward so your toes are pointed away from your body. Then slowly relax your foot to the starting position. 5. Repeat 8 to 12 times. Resisted ankle dorsiflexion 1. Tie the ends of an exercise band together to form a loop. Attach one end of the loop to a secure object, like a table leg, or shut a door on it to hold it in place. (Or you can have someone hold one end of the loop to provide resistance.) 2. While sitting on the floor or in a chair, loop the other end of the band over the top of your affected foot. 3. Keeping your knee and leg straight, slowly flex your foot toward you to pull back on the exercise band, and then slowly relax. 4. Repeat 8 to 12 times. Resisted ankle inversion 1. Sit on the floor with your good leg crossed over your other leg. 2. Hold both ends of an exercise band and loop the band around the inside of your affected foot. Then press your good foot against the band. 3. Keeping your legs crossed, slowly push your affected foot against the band so that foot moves away from your good foot. Then slowly relax. 4. Repeat 8 to 12 times. Resisted ankle eversion 1. Sit on the floor with your legs straight. 2. Hold both ends of an exercise band and loop the band around the outside of your affected foot. Then press your good foot against the band. 3. Keeping your leg straight, slowly push your affected foot outward against the band and away from your good foot without letting your leg rotate. Then slowly relax. 4. Repeat 8 to 12 times. Ankle alphabet 1. Sit in a chair with your feet flat on the floor.  (You can also do this exercise lying on your back with your affected leg propped up on a pillow). 2. Lift the heel of your affected foot off the floor, and slowly trace the letters of the alphabet. Heel raises 1. Stand with your feet a few inches apart, with your hands lightly resting on a counter or chair in front of you. 2. Slowly raise your heels off the floor while keeping your knees straight. 3. Hold for about 6 seconds, then slowly lower your heels to the floor. 4. Do 8 to 12 repetitions several times during the day. Follow-up care is a key part of your treatment and safety. Be sure to make and go to all appointments, and call your doctor if you are having problems. It's also a good idea to know your test results and keep a list of the medicines you take. Where can you learn more? Go to http://tony-iris.info/. Enter T800 in the search box to learn more about \"Ankle Fracture: Rehab Exercises. \" Current as of: March 21, 2017 Content Version: 11.4 © 2067-7935 [employer]. Care instructions adapted under license by [employer] (which disclaims liability or warranty for this information). If you have questions about a medical condition or this instruction, always ask your healthcare professional. [address] any warranty or liability for your use of this information. Introducing [facility]! [facility] introduces [employer] patient portal. Now you can access parts of your medical record, email your doctor's office, and request medication refills online. 1. In your internet browser, go to [email or web address] 2. Click on the First Time User? Click Here link in the Sign In box. You will see the New Member Sign Up page. 3. Enter your [employer] Access Code exactly as it appears below. You will not need to use this code after youve completed the sign-up process.  If you do not sign up before the expiration date, you must request a new code. · [employer] Access Code: [email or web address] Expires: 9/19/2018 11:37 AM 
 
4. Enter the last four digits of your Social Security Number (xxxx) and Date of Birth (mm/dd/yyyy) as indicated and click Submit. You will be taken to the next sign-up page. 5. Create a [employer] ID. This will be your [employer] login ID and cannot be changed, so think of one that is secure and easy to remember. 6. Create a [employer] password. You can change your password at any time. 7. Enter your Password Reset Question and Answer. This can be used at a later time if you forget your password. 8. Enter your e-mail address. You will receive e-mail notification when new information is available in [address] 9. Click Sign Up. You can now view and download portions of your medical record. 10. Click the Download Summary menu link to download a portable copy of your medical information. If you have questions, please visit the Frequently Asked Questions section of the [employer] website. Remember, [employer] is NOT to be used for urgent needs. For medical emergencies, dial 911. Now available from your iPhone and Android! Please provide this summary of care documentation to your next provider. Your primary care clinician is listed as NONE. If you have any questions after today's visit, please call [phone].

## 2018-06-21 NOTE — PROCEDURES
X-rays, 6 views of the right ankle ankle and tib/fib reveals post op changes s/p removal of external fixator and ORIF right ankle. Overall alignment looks good. Hardware in good position.  Non-operative treatment of fibula shaft fracture that shows some healing, but no complete healing noted at this time.

## 2018-06-25 ENCOUNTER — TELEPHONE (OUTPATIENT)
Dept: ORTHOPEDIC SURGERY | Age: 28
End: 2018-06-25

## 2018-06-25 NOTE — TELEPHONE ENCOUNTER
Spoke with patient , advised him that per MARBELLA Allen, he may return to sedentary work. Patient states that he has tried walking without the boot, wearing his airsport brace without any pain. Patient is requesting to return to work on 6-26-18 with no restrictions.

## 2018-06-25 NOTE — TELEPHONE ENCOUNTER
Patient is stating he needs a letter for work stating exactly when he will be able to start back work.   Patient can be reached at [phone]

## 2018-06-25 NOTE — LETTER
NOTIFICATION RETURN TO WORK / SCHOOL 
 
6/26/2018 4:52 PM 
 
Mr. Chad Herrera [facility] [address] To Whom It May Concern: 
 
Chad Herrera is currently under the care of [address] [place], [place] [address]. He will remain out of work until his follow up appointment on 7-17-18. If there are questions or concerns please have the patient contact our office.  
 
 
 
Sincerely, 
 
 
Titi Mitchell MD

## 2018-06-25 NOTE — TELEPHONE ENCOUNTER
Patient's fracture is not yet healed. He can have a note to return to sedentary/desk duty if available. Patient will need PT but his is not yet ready. He will be evaluated at [facility].     David Singletary PA-C  6/25/2018   1:48 PM

## 2018-06-25 NOTE — TELEPHONE ENCOUNTER
Patient called back regarding this, he states light duty is available at his job. 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 Please advise patient back regarding this at [phone].

## 2018-06-25 NOTE — TELEPHONE ENCOUNTER
We need the detail specifics of his job duty.  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

## 2018-06-26 NOTE — TELEPHONE ENCOUNTER
Discussed with MARBELLA Allen, per MARBELLA Allen, patient may return to work sedentary work only. Work note written and patient notified.

## 2018-07-17 ENCOUNTER — OFFICE VISIT (OUTPATIENT)
Dept: ORTHOPEDIC SURGERY | Age: 28
End: 2018-07-17

## 2018-07-17 VITALS
SYSTOLIC BLOOD PRESSURE: 151 MMHG | DIASTOLIC BLOOD PRESSURE: 95 MMHG | HEIGHT: 75 IN | WEIGHT: 190 LBS | HEART RATE: 52 BPM | TEMPERATURE: 98.6 F | RESPIRATION RATE: 16 BRPM | OXYGEN SATURATION: 98 % | BODY MASS INDEX: 23.62 KG/M2

## 2018-07-17 DIAGNOSIS — Z01.818 PRE-OP EXAM: ICD-10-CM

## 2018-07-17 DIAGNOSIS — S82.851D CLOSED TRIMALLEOLAR FRACTURE OF RIGHT ANKLE WITH ROUTINE HEALING, SUBSEQUENT ENCOUNTER: Primary | ICD-10-CM

## 2018-07-17 DIAGNOSIS — Z96.9 RETAINED ORTHOPEDIC HARDWARE: ICD-10-CM

## 2018-07-17 DIAGNOSIS — Z01.818 PRE-OPERATIVE EXAMINATION: ICD-10-CM

## 2018-07-17 DIAGNOSIS — S82.201D CLOSED FRACTURE OF RIGHT TIBIA AND FIBULA WITH ROUTINE HEALING, SUBSEQUENT ENCOUNTER: ICD-10-CM

## 2018-07-17 DIAGNOSIS — Z98.890 POST-OPERATIVE STATE: ICD-10-CM

## 2018-07-17 DIAGNOSIS — S82.401D CLOSED FRACTURE OF RIGHT TIBIA AND FIBULA WITH ROUTINE HEALING, SUBSEQUENT ENCOUNTER: ICD-10-CM

## 2018-07-17 NOTE — PROCEDURES
X-rays of the right ankle ankle and tib/fib reveals post op changes s/p removal of external fixator and ORIF right ankle. The more proximal of the two syndesmotic screws is broken. Overall alignment is acceptable. Non-operative treatment of fibula shaft fracture that shows some healing, but no complete healing noted at this time.

## 2018-07-17 NOTE — PROGRESS NOTES
Patient: Mirian Pete                MRN: [id number]       SSN: [id number]  YOB: 1990            AGE: 32 y.o. SEX: male    PCP:None    POST OP OFFICE NOTE  DOS: 3/14/18    Chief Complaint:   Chief Complaint   Patient presents with    Ankle Pain     right ankle pain f/u        HPI:     The patient is a 32 y.o. male who presents today for follow up 125 days s/p removal of external fixator, Open reduction internal fixation right ankle. Patient has been WB to the right lower extremity. Patient presents to office wearing [facility] brace with a slide on shoe. He states he could not fit into is shoes, so he has been wearing the brace with his slide on shoes. Patient reports no pain. Patient denies any fever, chills, chest pain, shortness of breath or calf pain. There are no new illness or injuries to report since last seen in the office. Patient states that he would like to go back to his job as  at [employer]. Patient states that he is about to get evicted. He states that his employer cut off his STD in June stating that he had used his 15 weeks. He states that he was not transition to LTD. Patient did not inquire with his employer regarding his status and did not contact our office for work note/documentation of his continued care. Advised patient we would provide him with a work note today and that he should contact his employer. He andunderstands that he will not be able to return to work at this time as he requires an additional surgery to remove the broken hardware in his ankle. PHYSICAL EXAM:     Visit Vitals    BP (!) 151/95    Pulse (!) 52    Temp 98.6 °F (37 °C) (Oral)    Resp 16    Ht 6' 3\" (1.905 m)    Wt 190 lb (86.2 kg)    SpO2 98%    BMI 23.75 kg/m2         Pain Scale: 0/10    GEN:  Alert, well developed, well nourished, well appearing 32 y.o. male in no acute distress. PSYCH:  Normal affect, mood, and conduct.  alert, oriented x 3 alert, oriented x 3, no dementia  M/S EXAMINATION OF: right foot/ankle  INCISION: incision is well healed  SKIN: mild edema , no erythema, no  ecchymosis, no warmth, dry skin. TENDERNESS:  mild tenderness to palpation   NEUROVASCULAR:  grossly intact. Positive distal pulses and capillary refill. DVT ASSESSMENT:  The calf is not tender to palpation. No evidence of DVT seen on physical exam.  ROM: improving       RADIOGRAPHS & DIAGNOSTIC STUDIES     Results for orders placed or performed in visit on 07/17/18   AMB POC XRAY, ANKLE; COMPLETE, 3+ VIE    Narrative    Vinny Parson PA-C     7/17/2018  7:32 PM  X-rays of the right ankle ankle and tib/fib reveals post op   changes s/p removal of external fixator and ORIF right ankle. The   more proximal of the two syndesmotic screws is broken. Overall   alignment is acceptable. Non-operative treatment of fibula shaft   fracture that shows some healing, but no complete healing noted   at this time. AMB POC XRAY; TIBIA & FIBULA, TWO VIE    Red Parson PA-C     7/17/2018  7:32 PM  X-rays of the right ankle ankle and tib/fib reveals post op   changes s/p removal of external fixator and ORIF right ankle. The   more proximal of the two syndesmotic screws is broken. Overall   alignment is acceptable. Non-operative treatment of fibula shaft   fracture that shows some healing, but no complete healing noted   at this time. IMPRESSION:     Encounter Diagnoses     ICD-10-CM ICD-9-CM   1. Closed trimalleolar fracture of right ankle with routine healing, subsequent encounter S82.851D V54.19   2. Closed fracture of right tibia and fibula with routine healing, subsequent encounter S82.201D V54.16    S82.401D    3. Post-operative state Z98.890 V45.89   4. Pre-op exam Z01.818 V72.84   5. Retained orthopedic hardware Z96.9 V45.89   6.  Pre-operative examination Z01.818 V72.84       PLAN:         Orders Placed This Encounter    [74976] Ankle Min 3V    [39082] Tib/Fib 2V    CBC WITH AUTOMATED DIFF    PROTHROMBIN TIME + INR    METABOLIC PANEL, COMPREHENSIVE    DRUG SCREEN UR - W/ CONFIRM            Discussed patient with Dr. Clement Hollingsworth. The most proximal syndesmotic screw is broken. Patient will require removal of syndesmotic screws from right ankle, possible placement of plate and screws to fibula. Part of the broken screw will be removed and part will be retained. Patient has been instructed to WBAT in CAM boot. He will contact his employer regarding his leave status (STD versus LTD). Patient will have pre-op labs completed and follow up as instructed by Christa Mckinnon. REVIEW OF SYSTEMS:     Otherwise as noted in HPI      PAST MEDICAL HISTORY:     Past Medical History:   Diagnosis Date    Hypertension     pt denies this    Seasonal allergic rhinitis        MEDICATIONS:     Current Outpatient Prescriptions   Medication Sig    celecoxib (CELEBREX) 200 mg capsule Take 1 Cap by mouth daily (with breakfast).  HYDROcodone-acetaminophen (NORCO) 5-325 mg per tablet One tablet by mouth BID    polyethylene glycol (MIRALAX) 17 gram packet Take 1 Packet by mouth daily.  promethazine (PHENERGAN) 25 mg tablet Take 1 Tab by mouth every six (6) hours as needed for Nausea.  aspirin (ASPIRIN) 325 mg tablet Take 1 Tab by mouth daily. No current facility-administered medications for this visit. ALLERGIES:     No Known Allergies      PAST SURGICAL HISTORY:     Past Surgical History:   Procedure Laterality Date    HX ANKLE FRACTURE TX Right 02/27/2018    HX ORTHOPAEDIC  02/27/2018    External Fixation right ankle       SOCIAL HISTORY:     Social History     Social History    Marital status: SINGLE     Spouse name: N/A    Number of children: N/A    Years of education: N/A     Occupational History    Not on file.      Social History Main Topics    Smoking status: Never Smoker    Smokeless tobacco: Never Used    Alcohol use No    Drug use: No    Sexual activity: Not on file Other Topics Concern    Not on file     Social History Narrative       FAMILY HISTORY:     Family History   Problem Relation Age of Onset    Hypertension Other     Hypertension Maternal Grandmother            Ivon Rivera PA-C  7/17/2018

## 2018-07-17 NOTE — LETTER
NOTIFICATION RETURN TO WORK / SCHOOL 
 
7/17/2018 10:13 AM 
 
Mr. Paco Penn [address] [facility] [address] To Whom It May Concern: 
 
Paco Penn is currently under the care of [address] [place] [address] Nathaniel Newby. He is still currently under our care for a surgery in February. Mr. Meagan Motley will also continue to be under our care and is scheduled for surgery next week. If there are questions or concerns please have the patient contact our office. Sincerely, Kevin Mckeon PA-C

## 2018-07-17 NOTE — MR AVS SNAPSHOT
[address] [place] [address], [address] [facility] 
[phone] Patient: Merrill Lombard MRN: [id number] :1990 Visit Information Date & Time Provider Department Dept. Phone Encounter #  
 2018  9:15 AM Ritu Ortega S [facility] [phone] Follow-up Instructions Return in about 5 weeks (around 2018) for follow up evaluation. Upcoming Health Maintenance Date Due DTaP/Tdap/Td series (1 - Tdap) 10/10/2011 Influenza Age 5 to Adult 2018 Allergies as of 2018  Review Complete On: 2018 By: Sukhdeep Ivey PA-C No Known Allergies Current Immunizations  Never Reviewed No immunizations on file. Not reviewed this visit You Were Diagnosed With   
  
 Codes Comments Closed trimalleolar fracture of right ankle with routine healing, subsequent encounter    -  Primary ICD-10-CM: V05.967E ICD-9-CM: V54.19 Closed fracture of right tibia and fibula with routine healing, subsequent encounter     ICD-10-CM: S82.201D, S82.401D ICD-9-CM: V54.16 Post-operative state     ICD-10-CM: X65.701 ICD-9-CM: V45.89 Pre-op exam     ICD-10-CM: E95.010 ICD-9-CM: V72.84 Vitals BP Pulse Temp Resp Height(growth percentile) Weight(growth percentile) (!) 151/95 (!) 52 98.6 °F (37 °C) (Oral) 16 6' 3\" (1.905 m) 190 lb (86.2 kg) SpO2 BMI Smoking Status 98% 23.75 kg/m2 Never Smoker Vitals History BMI and BSA Data Body Mass Index Body Surface Area  
 23.75 kg/m 2 2.14 m 2 Preferred Pharmacy Pharmacy Name Phone [address] [place] [address]. [phone] Your Updated Medication List  
  
   
This list is accurate as of 18 10:15 AM.  Always use your most recent med list.  
  
  
  
  
 aspirin 325 mg tablet Commonly known as:  ASPIRIN  
 Take 1 Tab by mouth daily. celecoxib 200 mg capsule Commonly known as:  CeleBREX Take 1 Cap by mouth daily (with breakfast). HYDROcodone-acetaminophen 5-325 mg per tablet Commonly known as:  Benetta Pock One tablet by mouth BID  
  
 polyethylene glycol 17 gram packet Commonly known as:  Avril Clause Take 1 Packet by mouth daily. promethazine 25 mg tablet Commonly known as:  PHENERGAN Take 1 Tab by mouth every six (6) hours as needed for Nausea. We Performed the Following AMB POC XRAY, ANKLE; COMPLETE, 3+ VIE [09576 CPT(R)] AMB POC XRAY; TIBIA & FIBULA, TWO VIE [93161 CPT(R)] Follow-up Instructions Return in about 5 weeks (around 8/21/2018) for follow up evaluation. To-Do List   
 07/17/2018 Lab:  CBC WITH AUTOMATED DIFF   
  
 07/17/2018 Lab:  DRUG SCREEN UR - W/ CONFIRM   
  
 07/17/2018 Lab:  METABOLIC PANEL, COMPREHENSIVE   
  
 07/17/2018 Lab:  PROTHROMBIN TIME + INR Introducing \[facility]\"" & HEALTH SERVICES! [facility] introduces [employer] patient portal. Now you can access parts of your medical record, email your doctor's office, and request medication refills online. 1. In your internet browser, go to [email or web address] 2. Click on the First Time User? Click Here link in the Sign In box. You will see the New Member Sign Up page. 3. Enter your [employer] Access Code exactly as it appears below. You will not need to use this code after youve completed the sign-up process. If you do not sign up before the expiration date, you must request a new code. · [employer] Access Code: [email or web address] Expires: 9/19/2018 11:37 AM 
 
4. Enter the last four digits of your Social Security Number (xxxx) and Date of Birth (mm/dd/yyyy) as indicated and click Submit. You will be taken to the next sign-up page. 5. Create a [employer] ID.  This will be your [employer] login ID and cannot be changed, so think of one that is secure and easy to remember. 6. Create a [employer] password. You can change your password at any time. 7. Enter your Password Reset Question and Answer. This can be used at a later time if you forget your password. 8. Enter your e-mail address. You will receive e-mail notification when new information is available in [address] 9. Click Sign Up. You can now view and download portions of your medical record. 10. Click the Download Summary menu link to download a portable copy of your medical information. If you have questions, please visit the Frequently Asked Questions section of the [employer] website. Remember, [employer] is NOT to be used for urgent needs. For medical emergencies, dial 911. Now available from your iPhone and Android! Please provide this summary of care documentation to your next provider. Your primary care clinician is listed as NONE. If you have any questions after today's visit, please call [phone].

## 2018-07-17 NOTE — H&P
FOOT AND ANKLE HISTORY AND PHYSICAL      Patient: Rolan Faust                   MRN: [id number]         SSN: [id number]  YOB: 1990                   AGE: 32 y.o. SEX: male    Patient scheduled for:  Removal of syndesmotic screws from right ankle, possible placement of fibula plate and screws   Date of surgery: 7/27/18  [facility]   Surgeon: Ina Castro MD  ANESTHESIA TYPE:  General, Popliteal block          PRESCRIPTIONS AND/OR ORDERS PROVIDED DURING H&P:    Orders Placed This Encounter    [81491] Ankle Min 3V    [59529] Tib/Fib 2V    CBC WITH AUTOMATED DIFF    PROTHROMBIN TIME + INR    METABOLIC PANEL, COMPREHENSIVE    DRUG SCREEN UR - W/ CONFIRM              HISTORY:     The patient is a pleasant 32 y.o. male who has a history of ORIF right ankle, closed tx of proximal fibula fracture on 3/14/18. Patient presents to office wearing [facility] brace with a slide on shoe. He states he could not fit into is shoes, so he has been wearing the brace with his slide on shoes. Patient reports no pain. Patient denies any fever, chills, chest pain, shortness of breath or calf pain. There are no new illness or injuries to report since last seen in the office. Patient states that he would like to go back to his job as  at [employer]. Patient undunderstands that he will not be able to return to work at this time as will require an additional surgery to remove the broken hardware in his ankle. Due to the current findings, affected activity of daily living and continued pain and discomfort, surgical intervention is indicated.  The alternatives, risks, and complications, including but not limited to infection, blood loss, need for blood transfusion, neurovascular damage, blake-incisional numbness, subcutaneous hematoma, bone fracture, anesthetic complications, DVT, PE, death, RSD, postoperative stiffness and pain, possible surgical scar, delayed healing and nonhealing, reflexive sympathetic dystrophy, damage to blood vessels and nerves, need for more surgery, MI, and stroke have been discussed. The patient understands and wishes to proceed with surgery. PAST MEDICAL HISTORY:     Past Medical History:   Diagnosis Date    Hypertension     pt denies this    Seasonal allergic rhinitis        CURRENT MEDICATIONS:     Current Outpatient Prescriptions   Medication Sig Dispense Refill    celecoxib (CELEBREX) 200 mg capsule Take 1 Cap by mouth daily (with breakfast). 30 Cap 2    HYDROcodone-acetaminophen (NORCO) 5-325 mg per tablet One tablet by mouth BID 20 Tab 0    polyethylene glycol (MIRALAX) 17 gram packet Take 1 Packet by mouth daily. 10 Packet 1    promethazine (PHENERGAN) 25 mg tablet Take 1 Tab by mouth every six (6) hours as needed for Nausea. 30 Tab 0    aspirin (ASPIRIN) 325 mg tablet Take 1 Tab by mouth daily. 30 Tab 0       ALLERGIES:     No Known Allergies      SURGICAL HISTORY:     Past Surgical History:   Procedure Laterality Date    HX ANKLE FRACTURE TX Right 02/27/2018    HX ORTHOPAEDIC  02/27/2018    External Fixation right ankle       SOCIAL HISTORY:     Social History     Social History    Marital status: SINGLE     Spouse name: N/A    Number of children: N/A    Years of education: N/A     Social History Main Topics    Smoking status: Never Smoker    Smokeless tobacco: Never Used    Alcohol use No    Drug use: No    Sexual activity: Not Asked     Other Topics Concern    None     Social History Narrative       FAMILY HISTORY:     Family History   Problem Relation Age of Onset    Hypertension Other     Hypertension Maternal Grandmother        REVIEW OF SYSTEMS:     Negative for fevers, chills, chest pain, shortness of breath, weight loss, recent illness     General: Negative for fever and chills. No unexpected change in weight. Denies fatigue. No change in appetite. Skin: Negative for rash or itching. HEENT: Negative for congestion, sore throat, neck pain and neck stiffness. No change in vision or hearing. Hasn't noted any enlarged lymph nodes in the neck. Cardiovascular:  Negative for chest pain and palpitations. Has not noted pedal edema. Respiratory: Negative for cough, colds, sinus, hemoptysis, shortness of breath and wheezing. Gastrointestinal: Negative for nausea and vomiting, rectal bleeding, coffee ground emesis, abdominal pain, diarrhea and constipation. Genitourinary: Negative for dysuria, frequency urgency, or burning on micturition. No flank pain, no foul smelling urine, no difficulty with initiating urination. Hematological: Negative for bleeding or easy bruising. Musculoskeletal: Negative  for arthralgias, back pain or neck pain. Neurological: Negative for dizziness, seizures or syncopal episodes. Denies headaches. Endocrine: Denies excessive thirst.  No heat/cold intolerance. Psychiatric: Negative for depression or insomnia. PHYSICAL EXAMINATION:     VITALS:   Visit Vitals    BP (!) 151/95    Pulse (!) 52    Temp 98.6 °F (37 °C) (Oral)    Resp 16    Ht 6' 3\" (1.905 m)    Wt 190 lb (86.2 kg)    SpO2 98%    BMI 23.75 kg/m2       GEN:  Well developed, well nourished 32 y.o. male in no acute distress. PSYCH: Alert an oriented to person, place and time. Mood, memory, affect, behavior and judgment normal  HEENT: Normocephalic and atraumatic. Eyes: Conjunctivae and EOM are normal.Pupils are equal, round, and reactive to light. External ear normal appearance, external nose normal appearing. Mouth/Throat: Oropharynx is clear and moist, able to handle oral secretions w/out difficulty, airway patent  NECK: Supple. Normal ROM, No lymphadenopathy. Trachea is midline. No bruising, swelling or deformity  RESP: Clear to auscultation bilaterally. No wheezes, rales, rhonchi. Normal effort and breath sounds. No respiratory distress  CARDIO: Bradycardia noted. No MGR.    ABDOMEN: Soft, non-tender, non-distended, normoactive bowel sounds in all four quadrants. There is no tenderness. There is no rebound and no guarding. BACK: No CVA or spinal tenderness  BREAST:  Deferred  PELVIC:    Deferred   RECTAL:  Deferred   :           Deferred  EXTREMITIES: EXAMINATION OF: right foot/ankle  INCISION: incision is well healed  SKIN: mild edema , no erythema, no  ecchymosis, no warmth, dry skin. TENDERNESS:  mild tenderness to palpation   NEUROVASCULAR:  grossly intact. Positive distal pulses and capillary refill. DVT ASSESSMENT:  The calf is not tender to palpation. No evidence of DVT seen on physical exam.  ROM: improving      RADIOGRAPHS & DIAGNOSTIC STUDIES:     X-rays of the right ankle ankle and tib/fib reveals post op changes s/p removal of external fixator and ORIF right ankle. The more proximal of the two syndesmotic screws is broken. Overall alignment is acceptable. Non-operative treatment of fibula shaft fracture that shows some healing, but no complete healing noted at this time. LABS:     Pending    ASSESSMENT:       Encounter Diagnoses   Name Primary?  Closed trimalleolar fracture of right ankle with routine healing, subsequent encounter Yes    Closed fracture of right tibia and fibula with routine healing, subsequent encounter     Post-operative state     Pre-op exam     Retained orthopedic hardware     Pre-operative examination        PLAN:     Again, the alternatives, risks, and complications, as well as expected outcome were discussed. The patient understands and agrees to proceed with the above listed surgery pending completion of preoperative labs and diagnostic studies. Patient has been given Hibiclens wash with instructions and prescriptions and or orders listed above.     Mohsen Abdalla PA-C  7/17/2018  6:51 PM

## 2018-07-18 ENCOUNTER — HOSPITAL ENCOUNTER (OUTPATIENT)
Dept: PREADMISSION TESTING | Age: 28
Discharge: HOME | End: 2018-07-18
Payer: SELF-PAY

## 2018-07-18 DIAGNOSIS — Z01.818 PRE-OP EXAM: ICD-10-CM

## 2018-07-18 LAB
ALBUMIN SERPL-MCNC: 4.2 G/DL (ref 3.4–5)
ALBUMIN/GLOB SERPL: 1.3 {RATIO} (ref 0.8–1.7)
ALP SERPL-CCNC: 87 U/L (ref 45–117)
ALT SERPL-CCNC: 64 U/L (ref 16–61)
ANION GAP SERPL CALC-SCNC: 5 MMOL/L (ref 3–18)
AST SERPL-CCNC: 50 U/L (ref 15–37)
BASOPHILS # BLD: 0 K/UL (ref 0–0.1)
BASOPHILS NFR BLD: 0 % (ref 0–2)
BILIRUB SERPL-MCNC: 0.4 MG/DL (ref 0.2–1)
BUN SERPL-MCNC: 21 MG/DL (ref 7–18)
BUN/CREAT SERPL: 19 (ref 12–20)
CALCIUM SERPL-MCNC: 9 MG/DL (ref 8.5–10.1)
CHLORIDE SERPL-SCNC: 108 MMOL/L (ref 100–108)
CO2 SERPL-SCNC: 28 MMOL/L (ref 21–32)
CREAT SERPL-MCNC: 1.1 MG/DL (ref 0.6–1.3)
DIFFERENTIAL METHOD BLD: NORMAL
EOSINOPHIL # BLD: 0.1 K/UL (ref 0–0.4)
EOSINOPHIL NFR BLD: 1 % (ref 0–5)
ERYTHROCYTE [DISTWIDTH] IN BLOOD BY AUTOMATED COUNT: 14 % (ref 11.6–14.5)
GLOBULIN SER CALC-MCNC: 3.3 G/DL (ref 2–4)
GLUCOSE SERPL-MCNC: 110 MG/DL (ref 74–99)
HCT VFR BLD AUTO: 41.1 % (ref 36–48)
HGB BLD-MCNC: 13.6 G/DL (ref 13–16)
INR PPP: 1 (ref 0.8–1.2)
LYMPHOCYTES # BLD: 2.4 K/UL (ref 0.9–3.6)
LYMPHOCYTES NFR BLD: 33 % (ref 21–52)
MCH RBC QN AUTO: 27.4 PG (ref 24–34)
MCHC RBC AUTO-ENTMCNC: 33.1 G/DL (ref 31–37)
MCV RBC AUTO: 82.7 FL (ref 74–97)
MONOCYTES # BLD: 0.5 K/UL (ref 0.05–1.2)
MONOCYTES NFR BLD: 6 % (ref 3–10)
NEUTS SEG # BLD: 4.3 K/UL (ref 1.8–8)
NEUTS SEG NFR BLD: 60 % (ref 40–73)
PLATELET # BLD AUTO: 186 K/UL (ref 135–420)
PMV BLD AUTO: 10.2 FL (ref 9.2–11.8)
POTASSIUM SERPL-SCNC: 3.9 MMOL/L (ref 3.5–5.5)
PROT SERPL-MCNC: 7.5 G/DL (ref 6.4–8.2)
PROTHROMBIN TIME: 12.5 SEC (ref 11.5–15.2)
RBC # BLD AUTO: 4.97 M/UL (ref 4.7–5.5)
SODIUM SERPL-SCNC: 141 MMOL/L (ref 136–145)
WBC # BLD AUTO: 7.2 K/UL (ref 4.6–13.2)

## 2018-07-18 PROCEDURE — 85610 PROTHROMBIN TIME: CPT | Performed by: PHYSICIAN ASSISTANT

## 2018-07-18 PROCEDURE — 80053 COMPREHEN METABOLIC PANEL: CPT | Performed by: PHYSICIAN ASSISTANT

## 2018-07-18 PROCEDURE — 85025 COMPLETE CBC W/AUTO DIFF WBC: CPT | Performed by: PHYSICIAN ASSISTANT

## 2018-07-18 PROCEDURE — 36415 COLL VENOUS BLD VENIPUNCTURE: CPT | Performed by: PHYSICIAN ASSISTANT

## 2018-07-26 ENCOUNTER — ANESTHESIA EVENT (OUTPATIENT)
Dept: SURGERY | Age: 28
End: 2018-07-26
Payer: COMMERCIAL

## 2018-07-26 NOTE — BRIEF OP NOTE
BRIEF OPERATIVE NOTE    Date of Procedure: 7/27/2018   Preoperative Diagnosis: T84.84XA PAINFUL HARDWARE RIGHT ANKLE  Postoperative Diagnosis:  As above  Procedure(s):  REMOVAL OF SYNDESMOTIC SCREWS FROM RIGHT ANKLE/  POSSIBLE PLATE AND SCREW TO RIGHT FIBULA/BONE GRAFTING/C-ARM  Surgeon(s) and Role:     * Williams Knutson MD - Primary         Surgical Assistant:      Surgical Staff:  Circ-1: David Isaacs RN  Radiology Technician: (Unknown)  Scrub Tech-1: (Unknown)  Surg Asst-1: Miriam June  No case tracking events are documented in the log.   IV FLUIDS:  500 ml IVF  Tourniquet Time:  8 minutes at  300  Mm HG   Anesthesia: General and 13 ml 1/2 plain Marcaine  Estimated Blood Loss: 5 ml   Specimens: * No specimens in log *   Findings: healed severe ankle fracture/posterior tibia, syndesmotic injury   Complications: none  Implants: * No implants in log *

## 2018-07-26 NOTE — OP NOTES
Patient: Rabia Soriano                MRN:@           SSN: [id number]   YOB: 1990         AGE: 32 y.o. SEX: male       OPERATIVE REPORT    Date of Procedure: 7/27/2018   Preoperative Diagnosis: T84.84XA PAINFUL HARDWARE RIGHT ANKLE  Postoperative Diagnosis:  As above  Procedure(s):  REMOVAL OF SYNDESMOTIC SCREWS FROM RIGHT ANKLE/  POSSIBLE PLATE AND SCREW TO RIGHT FIBULA/BONE GRAFTING/C-ARM  Surgeon(s) and Role:     * Lynda Fitzgerald MD - Primary         Surgical Assistant:      Surgical Staff:  Circ-1: Celina Landin RN  Radiology Technician: (Unknown)  Scrub Tech-1: (Unknown)  Surg Asst-1: Bibiana Troy  No case tracking events are documented in the log. IV FLUIDS:  500 ml IVF  Tourniquet Time:  8 minutes at  300  Mm HG   Anesthesia:  General and 13 ml 1/2 plain Marcaine  Estimated Blood Loss: 5 ml   Specimens: * No specimens in log *   Findings: healed severe ankle fracture/posterior tibia, syndesmotic injury   Complications: none  Implants: * No implants in log *         OPERATIVE NOTE    Rabia Soriano was taken to the operating room suite 7/27/2018 and general anesthesia was obtained, the patient was positioned in a supine position and a was placed under the right ipsilateral hip in order to gently internally rotate the lower extremity. The right extremity was prepped and draped in the usual sterile fashion using a follow solution: Chlorhexidene soap and yellow prep sticks (2) . A regional block was performed prior to taking the patient to the OR. A formal verbal  time out was conducted: identifying the patient, identifying the surgical location, reading the informed written signed consent for procedure, confirmation that  the patient did receive preoperative antibiotics and that my signature on the patient was visible at the surgical field. All members of the surgical team agreed to the consent process.     The  right ipsilateral lower extremity was then exsanguinated with an Esmarch and a tourniquet was insufflated to  300 mmHg and attention was then directed towards surgery. I first tested the ankle with ER rotation test, and ankle was stable. I then made a 3 cm incision: incision to skin, subcutaneous fat and directly down to the 2 screw heads. The screws were removed easily. I then did a repeat stress external rotation test and the ankle mortise was stable. Intraoperative fluoroscopic films revealed anatomic alignment of the fibula and of the ankle mortise. The distal tibia and fibula relationship was restored and anatomically aligned. The incision was thoroughly irrigated with sterile hand pulsatile saline and the tourniquet was deflated and selective  electrocautery used for hemostasis. The tourniquet times was 8 minutes. Incision was nice and dry no bleeders into the case an incision was closed in layers with the following sutures: 3.0 Vicryl suture and then staples. General and 13 ml 1/2 plain Marcaine    Sterile dressings were applied which consisted of Xeroform, 4 x 4's,sterile ABD pads, a care and well-padded posterior splint was applied. The patient was then transferred from stable condition in no intraoperative complications. MD GUICHO Francisco MD  [facility]             Sherman Moreira MD  7/27/2018  11:04 AM

## 2018-07-27 ENCOUNTER — ANESTHESIA (OUTPATIENT)
Dept: SURGERY | Age: 28
End: 2018-07-27
Payer: COMMERCIAL

## 2018-07-27 ENCOUNTER — HOSPITAL ENCOUNTER (OUTPATIENT)
Age: 28
Setting detail: OUTPATIENT SURGERY
Discharge: HOME | End: 2018-07-27
Attending: ORTHOPAEDIC SURGERY | Admitting: ORTHOPAEDIC SURGERY
Payer: COMMERCIAL

## 2018-07-27 ENCOUNTER — APPOINTMENT (OUTPATIENT)
Dept: GENERAL RADIOLOGY | Age: 28
End: 2018-07-27
Attending: ORTHOPAEDIC SURGERY
Payer: COMMERCIAL

## 2018-07-27 VITALS
BODY MASS INDEX: 23.62 KG/M2 | TEMPERATURE: 97 F | HEIGHT: 75 IN | RESPIRATION RATE: 16 BRPM | DIASTOLIC BLOOD PRESSURE: 83 MMHG | WEIGHT: 190 LBS | HEART RATE: 68 BPM | SYSTOLIC BLOOD PRESSURE: 131 MMHG | OXYGEN SATURATION: 100 %

## 2018-07-27 DIAGNOSIS — S82.861H: Primary | ICD-10-CM

## 2018-07-27 PROCEDURE — 74011250636 HC RX REV CODE- 250/636

## 2018-07-27 PROCEDURE — 74011000250 HC RX REV CODE- 250: Performed by: ORTHOPAEDIC SURGERY

## 2018-07-27 PROCEDURE — 74011250636 HC RX REV CODE- 250/636: Performed by: NURSE ANESTHETIST, CERTIFIED REGISTERED

## 2018-07-27 PROCEDURE — 76210000006 HC OR PH I REC 0.5 TO 1 HR: Performed by: ORTHOPAEDIC SURGERY

## 2018-07-27 PROCEDURE — 74011250637 HC RX REV CODE- 250/637: Performed by: NURSE ANESTHETIST, CERTIFIED REGISTERED

## 2018-07-27 PROCEDURE — 76010000149 HC OR TIME 1 TO 1.5 HR: Performed by: ORTHOPAEDIC SURGERY

## 2018-07-27 PROCEDURE — 77030010509 HC AIRWY LMA MSK TELE -A: Performed by: ANESTHESIOLOGY

## 2018-07-27 PROCEDURE — 77030013480 HC CUF TRNQT J&J -B: Performed by: ORTHOPAEDIC SURGERY

## 2018-07-27 PROCEDURE — 77030018836 HC SOL IRR NACL ICUM -A: Performed by: ORTHOPAEDIC SURGERY

## 2018-07-27 PROCEDURE — 73610 X-RAY EXAM OF ANKLE: CPT

## 2018-07-27 PROCEDURE — 74011250636 HC RX REV CODE- 250/636: Performed by: PHYSICIAN ASSISTANT

## 2018-07-27 PROCEDURE — 77030011640 HC PAD GRND REM COVD -A: Performed by: ORTHOPAEDIC SURGERY

## 2018-07-27 PROCEDURE — 77030008467 HC STPLR SKN COVD -B: Performed by: ORTHOPAEDIC SURGERY

## 2018-07-27 PROCEDURE — 74011000250 HC RX REV CODE- 250

## 2018-07-27 PROCEDURE — 77030028224 HC PDNG CST BSNM -A: Performed by: ORTHOPAEDIC SURGERY

## 2018-07-27 PROCEDURE — 76060000033 HC ANESTHESIA 1 TO 1.5 HR: Performed by: ORTHOPAEDIC SURGERY

## 2018-07-27 PROCEDURE — 77030020782 HC GWN BAIR PAWS FLX 3M -B: Performed by: ORTHOPAEDIC SURGERY

## 2018-07-27 PROCEDURE — 77030003029 HC SUT VCRL J&J -B: Performed by: ORTHOPAEDIC SURGERY

## 2018-07-27 PROCEDURE — 76210000026 HC REC RM PH II 1 TO 1.5 HR: Performed by: ORTHOPAEDIC SURGERY

## 2018-07-27 RX ORDER — SODIUM CHLORIDE 0.9 % (FLUSH) 0.9 %
5-10 SYRINGE (ML) INJECTION EVERY 8 HOURS
Status: DISCONTINUED | OUTPATIENT
Start: 2018-07-27 | End: 2018-07-27 | Stop reason: HOSPADM

## 2018-07-27 RX ORDER — CEFAZOLIN SODIUM 2 G/50ML
2 SOLUTION INTRAVENOUS
Status: COMPLETED | OUTPATIENT
Start: 2018-07-27 | End: 2018-07-27

## 2018-07-27 RX ORDER — DEXAMETHASONE SODIUM PHOSPHATE 4 MG/ML
INJECTION, SOLUTION INTRA-ARTICULAR; INTRALESIONAL; INTRAMUSCULAR; INTRAVENOUS; SOFT TISSUE AS NEEDED
Status: DISCONTINUED | OUTPATIENT
Start: 2018-07-27 | End: 2018-07-27 | Stop reason: HOSPADM

## 2018-07-27 RX ORDER — SODIUM CHLORIDE, SODIUM LACTATE, POTASSIUM CHLORIDE, CALCIUM CHLORIDE 600; 310; 30; 20 MG/100ML; MG/100ML; MG/100ML; MG/100ML
100 INJECTION, SOLUTION INTRAVENOUS CONTINUOUS
Status: DISCONTINUED | OUTPATIENT
Start: 2018-07-27 | End: 2018-07-27 | Stop reason: HOSPADM

## 2018-07-27 RX ORDER — FENTANYL CITRATE 50 UG/ML
25 INJECTION, SOLUTION INTRAMUSCULAR; INTRAVENOUS
Status: DISCONTINUED | OUTPATIENT
Start: 2018-07-27 | End: 2018-07-27 | Stop reason: HOSPADM

## 2018-07-27 RX ORDER — MIDAZOLAM HYDROCHLORIDE 1 MG/ML
2 INJECTION, SOLUTION INTRAMUSCULAR; INTRAVENOUS ONCE
Status: DISCONTINUED | OUTPATIENT
Start: 2018-07-27 | End: 2018-07-27 | Stop reason: HOSPADM

## 2018-07-27 RX ORDER — MORPHINE SULFATE 10 MG/ML
2 INJECTION, SOLUTION INTRAMUSCULAR; INTRAVENOUS AS NEEDED
Status: DISCONTINUED | OUTPATIENT
Start: 2018-07-27 | End: 2018-07-27 | Stop reason: HOSPADM

## 2018-07-27 RX ORDER — SODIUM CHLORIDE 0.9 % (FLUSH) 0.9 %
5-10 SYRINGE (ML) INJECTION AS NEEDED
Status: DISCONTINUED | OUTPATIENT
Start: 2018-07-27 | End: 2018-07-27 | Stop reason: HOSPADM

## 2018-07-27 RX ORDER — HYDROCODONE BITARTRATE AND ACETAMINOPHEN 5; 325 MG/1; MG/1
1-2 TABLET ORAL
Status: DISCONTINUED | OUTPATIENT
Start: 2018-07-27 | End: 2018-07-27 | Stop reason: HOSPADM

## 2018-07-27 RX ORDER — FENTANYL CITRATE 50 UG/ML
INJECTION, SOLUTION INTRAMUSCULAR; INTRAVENOUS AS NEEDED
Status: DISCONTINUED | OUTPATIENT
Start: 2018-07-27 | End: 2018-07-27 | Stop reason: HOSPADM

## 2018-07-27 RX ORDER — ROPIVACAINE HYDROCHLORIDE 2 MG/ML
30 INJECTION, SOLUTION EPIDURAL; INFILTRATION; PERINEURAL
Status: DISCONTINUED | OUTPATIENT
Start: 2018-07-27 | End: 2018-07-27 | Stop reason: HOSPADM

## 2018-07-27 RX ORDER — SODIUM CHLORIDE, SODIUM LACTATE, POTASSIUM CHLORIDE, CALCIUM CHLORIDE 600; 310; 30; 20 MG/100ML; MG/100ML; MG/100ML; MG/100ML
75 INJECTION, SOLUTION INTRAVENOUS CONTINUOUS
Status: DISCONTINUED | OUTPATIENT
Start: 2018-07-27 | End: 2018-07-27 | Stop reason: HOSPADM

## 2018-07-27 RX ORDER — ONDANSETRON 2 MG/ML
INJECTION INTRAMUSCULAR; INTRAVENOUS AS NEEDED
Status: DISCONTINUED | OUTPATIENT
Start: 2018-07-27 | End: 2018-07-27 | Stop reason: HOSPADM

## 2018-07-27 RX ORDER — LIDOCAINE HYDROCHLORIDE 20 MG/ML
INJECTION, SOLUTION EPIDURAL; INFILTRATION; INTRACAUDAL; PERINEURAL AS NEEDED
Status: DISCONTINUED | OUTPATIENT
Start: 2018-07-27 | End: 2018-07-27 | Stop reason: HOSPADM

## 2018-07-27 RX ORDER — MIDAZOLAM HYDROCHLORIDE 1 MG/ML
INJECTION, SOLUTION INTRAMUSCULAR; INTRAVENOUS AS NEEDED
Status: DISCONTINUED | OUTPATIENT
Start: 2018-07-27 | End: 2018-07-27 | Stop reason: HOSPADM

## 2018-07-27 RX ORDER — PROPOFOL 10 MG/ML
INJECTION, EMULSION INTRAVENOUS AS NEEDED
Status: DISCONTINUED | OUTPATIENT
Start: 2018-07-27 | End: 2018-07-27 | Stop reason: HOSPADM

## 2018-07-27 RX ORDER — HYDROCODONE BITARTRATE AND ACETAMINOPHEN 5; 325 MG/1; MG/1
2 TABLET ORAL
Qty: 48 TAB | Refills: 1 | Status: SHIPPED | OUTPATIENT
Start: 2018-07-27 | End: 2018-08-01 | Stop reason: SDUPTHER

## 2018-07-27 RX ORDER — FAMOTIDINE 20 MG/1
20 TABLET, FILM COATED ORAL ONCE
Status: COMPLETED | OUTPATIENT
Start: 2018-07-27 | End: 2018-07-27

## 2018-07-27 RX ORDER — GLYCOPYRROLATE 0.2 MG/ML
INJECTION INTRAMUSCULAR; INTRAVENOUS AS NEEDED
Status: DISCONTINUED | OUTPATIENT
Start: 2018-07-27 | End: 2018-07-27 | Stop reason: HOSPADM

## 2018-07-27 RX ORDER — ONDANSETRON 2 MG/ML
4 INJECTION INTRAMUSCULAR; INTRAVENOUS ONCE
Status: DISCONTINUED | OUTPATIENT
Start: 2018-07-27 | End: 2018-07-27 | Stop reason: HOSPADM

## 2018-07-27 RX ORDER — FENTANYL CITRATE 50 UG/ML
100 INJECTION, SOLUTION INTRAMUSCULAR; INTRAVENOUS ONCE
Status: DISCONTINUED | OUTPATIENT
Start: 2018-07-27 | End: 2018-07-27 | Stop reason: HOSPADM

## 2018-07-27 RX ORDER — BUPIVACAINE HYDROCHLORIDE 5 MG/ML
INJECTION, SOLUTION EPIDURAL; INTRACAUDAL AS NEEDED
Status: DISCONTINUED | OUTPATIENT
Start: 2018-07-27 | End: 2018-07-27 | Stop reason: HOSPADM

## 2018-07-27 RX ADMIN — FAMOTIDINE 20 MG: 20 TABLET ORAL at 08:27

## 2018-07-27 RX ADMIN — FENTANYL CITRATE 25 MCG: 50 INJECTION, SOLUTION INTRAMUSCULAR; INTRAVENOUS at 10:30

## 2018-07-27 RX ADMIN — MIDAZOLAM HYDROCHLORIDE 2 MG: 1 INJECTION, SOLUTION INTRAMUSCULAR; INTRAVENOUS at 10:05

## 2018-07-27 RX ADMIN — ONDANSETRON 4 MG: 2 INJECTION INTRAMUSCULAR; INTRAVENOUS at 10:53

## 2018-07-27 RX ADMIN — CEFAZOLIN SODIUM 2 G: 2 SOLUTION INTRAVENOUS at 10:07

## 2018-07-27 RX ADMIN — DEXAMETHASONE SODIUM PHOSPHATE 4 MG: 4 INJECTION, SOLUTION INTRA-ARTICULAR; INTRALESIONAL; INTRAMUSCULAR; INTRAVENOUS; SOFT TISSUE at 10:28

## 2018-07-27 RX ADMIN — SODIUM CHLORIDE, SODIUM LACTATE, POTASSIUM CHLORIDE, AND CALCIUM CHLORIDE 75 ML/HR: 600; 310; 30; 20 INJECTION, SOLUTION INTRAVENOUS at 08:27

## 2018-07-27 RX ADMIN — FENTANYL CITRATE 50 MCG: 50 INJECTION, SOLUTION INTRAMUSCULAR; INTRAVENOUS at 10:19

## 2018-07-27 RX ADMIN — FENTANYL CITRATE 25 MCG: 50 INJECTION, SOLUTION INTRAMUSCULAR; INTRAVENOUS at 10:26

## 2018-07-27 RX ADMIN — PROPOFOL 50 MG: 10 INJECTION, EMULSION INTRAVENOUS at 10:19

## 2018-07-27 RX ADMIN — PROPOFOL 150 MG: 10 INJECTION, EMULSION INTRAVENOUS at 10:14

## 2018-07-27 RX ADMIN — GLYCOPYRROLATE 0.2 MG: 0.2 INJECTION INTRAMUSCULAR; INTRAVENOUS at 10:06

## 2018-07-27 RX ADMIN — LIDOCAINE HYDROCHLORIDE 80 MG: 20 INJECTION, SOLUTION EPIDURAL; INFILTRATION; INTRACAUDAL; PERINEURAL at 10:14

## 2018-07-27 NOTE — IP AVS SNAPSHOT
[facility] 
 
 
 [address] Patient: Otf Dwyer MRN: [id number] :1990 About your hospitalization You were admitted on:  2018 You last received care in the:  [facility] PHASE 2 RECOVERY You were discharged on:  2018 Why you were hospitalized Your primary diagnosis was:  Not on File Follow-up Information Follow up With Details Comments Contact Info None   None ([phone]) Patient stated that they have no PCP Leonardo Dorsey MD Follow up in 5 day(s) As scheduled [place] [address] [address] [facility] 
[phone] Your Scheduled Appointments 2018 10:45 AM EDT  
POST OP with William Gonsalez PA-C  
[address] ([address] [facility]) [place] [address], [address] [facility]  
[phone] Discharge Orders None A check yamilex indicates which time of day the medication should be taken. My Medications CHANGE how you take these medications Instructions Each Dose to Equal  
 Morning Noon Evening Bedtime HYDROcodone-acetaminophen 5-325 mg per tablet Commonly known as:  Tony Mai What changed:   
- how much to take 
- how to take this - when to take this 
- reasons to take this 
- additional instructions Your last dose was: Your next dose is: Take 2 Tabs by mouth every four (4) hours as needed for Pain. Max Daily Amount: 12 Tabs. 2 Tab CONTINUE taking these medications Instructions Each Dose to Equal  
 Morning Noon Evening Bedtime  
 aspirin 325 mg tablet Commonly known as:  ASPIRIN Your last dose was: Your next dose is: Take 1 Tab by mouth daily. 325 mg  
    
   
   
   
  
 polyethylene glycol 17 gram packet Commonly known as:  Lelan Situ Your last dose was: Your next dose is: Take 1 Packet by mouth daily. 17 g  
    
   
   
   
  
 promethazine 25 mg tablet Commonly known as:  PHENERGAN Your last dose was: Your next dose is: Take 1 Tab by mouth every six (6) hours as needed for Nausea. 25 mg ASK your doctor about these medications Instructions Each Dose to Equal  
 Morning Noon Evening Bedtime  
 celecoxib 200 mg capsule Commonly known as:  CeleBREX Your last dose was: Your next dose is: Take 1 Cap by mouth daily (with breakfast). 200 mg Where to Get Your Medications Information on where to get these meds will be given to you by the nurse or doctor. ! Ask your nurse or doctor about these medications HYDROcodone-acetaminophen 5-325 mg per tablet Opioid Education Prescription Opioids: What You Need to Know: 
 
Prescription opioids can be used to help relieve moderate-to-severe pain and are often prescribed following a surgery or injury, or for certain health conditions. These medications can be an important part of treatment but also come with serious risks. Opioids are strong pain medicines. Examples include hydrocodone, oxycodone, fentanyl, and morphine. Heroin is an example of an illegal opioid. It is important to work with your health care provider to make sure you are getting the safest, most effective care. WHAT ARE THE RISKS AND SIDE EFFECTS OF OPIOID USE? Prescription opioids carry serious risks of addiction and overdose, especially with prolonged use. An opioid overdose, often marked by slow breathing, can cause sudden death. The use of prescription opioids can have a number of side effects as well, even when taken as directed. · Tolerance-meaning you might need to take more of a medication for the same pain relief · Physical dependence-meaning you have symptoms of withdrawal when the medication is stopped. Withdrawal symptoms can include nausea, sweating, chills, diarrhea, stomach cramps, and muscle aches. Withdrawal can last up to several weeks, depending on which drug you took and how long you took it. · Increased sensitivity to pain · Constipation · Nausea, vomiting, and dry mouth · Sleepiness and dizziness · Confusion · Depression · Low levels of testosterone that can result in lower sex drive, energy, and strength · Itching and sweating RISKS ARE GREATER WITH:      
· History of drug misuse, substance use disorder, or overdose · Mental health conditions (such as depression or anxiety) · Sleep apnea · Older age (72 years or older) · Pregnancy Avoid alcohol while taking prescription opioids. Also, unless specifically advised by your health care provider, medications to avoid include: · Benzodiazepines (such as Xanax or Valium) · Muscle relaxants (such as Soma or Flexeril) · Hypnotics (such as Ambien or Lunesta) · Other prescription opioids KNOW YOUR OPTIONS Talk to your health care provider about ways to manage your pain that don't involve prescription opioids. Some of these options may actually work better and have fewer risks and side effects. Options may include: 
· Pain relievers such as acetaminophen, ibuprofen, and naproxen · Some medications that are also used for depression or seizures · Physical therapy and exercise · Counseling to help patients learn how to cope better with triggers of pain and stress. · Application of heat or cold compress · Massage therapy · Relaxation techniques Be Informed Make sure you know the name of your medication, how much and how often to take it, and its potential risks & side effects. IF YOU ARE PRESCRIBED OPIOIDS FOR PAIN: 
· Never take opioids in greater amounts or more often than prescribed. Remember the goal is not to be pain-free but to manage your pain at a tolerable level. · Follow up with your primary care provider to: · Work together to create a plan on how to manage your pain. · Talk about ways to help manage your pain that don't involve prescription opioids. · Talk about any and all concerns and side effects. · Help prevent misuse and abuse. · Never sell or share prescription opioids · Help prevent misuse and abuse. · Store prescription opioids in a secure place and out of reach of others (this may include visitors, children, friends, and family). · Safely dispose of unused/unwanted prescription opioids: Find your community drug take-back program or your pharmacy mail-back program, or flush them down the toilet, following guidance from the Food and Drug Administration (www.fda.gov/Drugs/ResourcesForYou). · Visit www.cdc.gov/drugoverdose to learn about the risks of opioid abuse and overdose. · If you believe you may be struggling with addiction, tell your health care provider and ask for guidance or call [address] [place], [place] [address] at 7-685-022-TLQQ. Discharge Instructions 1)  Follow up with Dr. Stephanie Luque. Manuel Prajapati in 5 days. 2)  No weight right leg yet please to the Right lower extremity 3)  Elevate the Right lower extremity on 1 pillow. Place the pillow horizontal so that no pressure is on the back of your heel. 4)  Leave your current dressings and in place. 5)   Call [phone] to confirm your appointment. 6)   Keep your dressings clean and dry to the: Right lower extremity 7)  Start taking your pain medications when you get home 8 ) Follow up with Primary Care Provider in 7 to 10 days. 9)  Please call [phone] ([address]) if any: fever, shakes, chills, intractable pain, or for any questions you have regarding your care/medical condition. 10)  If you experience any calf pain or swelling, or are having any shortness of breath, chest pain, or extremity swelling, or bleeding thru any surgical dressings, or Bleeding at any body location while you are taking on any blood thinners. ie (mouth,nose, skin sites:) Please go to closest ER  ASAP for assessment to rule out a leg clot and to assess any bleeding.   
11) Start Aspirin 325 mg po each day on: sat 7/28/2018. 
12) Pain Rx have been written for you Rip Pitt. There are no outpatient Patient Instructions on file for this admission. Ernie Rodríguez MD 
7/27/2018 11:08 AM 
 
 
DISCHARGE SUMMARY from Nurse PATIENT INSTRUCTIONS: 
 
 
F-face looks uneven A-arms unable to move or move unevenly S-speech slurred or non-existent T-time-call 911 as soon as signs and symptoms begin-DO NOT go Back to bed or wait to see if you get better-TIME IS BRAIN. Warning Signs of HEART ATTACK Call 911 if you have these symptoms: 
? Chest discomfort. Most heart attacks involve discomfort in the center of the chest that lasts more than a few minutes, or that goes away and comes back. It can feel like uncomfortable pressure, squeezing, fullness, or pain. ? Discomfort in other areas of the upper body. Symptoms can include pain or discomfort in one or both arms, the back, neck, jaw, or stomach. ? Shortness of breath with or without chest discomfort. ? Other signs may include breaking out in a cold sweat, nausea, or lightheadedness. Don't wait more than five minutes to call [address]! Fast action can save your life. Calling 911 is almost always the fastest way to get lifesaving treatment. Emergency Medical Services staff can begin treatment when they arrive  up to an hour sooner than if someone gets to the hospital by car. The discharge information has been reviewed with the patient.   The patient verbalized understanding. Discharge medications reviewed with the patient and appropriate educational materials and side effects teaching were provided. Narcotic-Analgesic/Acetaminophen (Percocet, Norco, Lorcet HD, Lortab 10/325) - (By mouth) Why this medicine is used:  
Relieves pain. Contact a nurse or doctor right away if you have: 
· Extreme weakness, shallow breathing, slow heartbeat · Severe confusion, lightheadedness, dizziness, fainting · Yellow skin or eyes, dark urine or pale stools · Severe constipation, severe stomach pain, nausea, vomiting, loss of appetite · Sweating or cold, clammy skin Common side effects: · Mild constipation, nausea, vomiting · Sleepiness, tiredness · Itching, rash © 2017 [address] Information is for End User's use only and may not be sold, redistributed or otherwise used for commercial purposes. ___________________________________________________________________________________________________________________________________ Introducing [facility]! [facility] introduces Myrio patient portal. Now you can access parts of your medical record, email your doctor's office, and request medication refills online. 1. In your internet browser, go to [email or web address] 2. Click on the First Time User? Click Here link in the Sign In box. You will see the New Member Sign Up page. 3. Enter your Myrio Access Code exactly as it appears below. You will not need to use this code after youve completed the sign-up process. If you do not sign up before the expiration date, you must request a new code. · Myrio Access Code: [email or web address] Expires: 9/19/2018 11:37 AM 
 
4. Enter the last four digits of your Social Security Number (xxxx) and Date of Birth (mm/dd/yyyy) as indicated and click Submit. You will be taken to the next sign-up page. 5. Create a fundfindr ID. This will be your fundfindr login ID and cannot be changed, so think of one that is secure and easy to remember. 6. Create a fundfindr password. You can change your password at any time. 7. Enter your Password Reset Question and Answer. This can be used at a later time if you forget your password. 8. Enter your e-mail address. You will receive e-mail notification when new information is available in [address] 9. Click Sign Up. You can now view and download portions of your medical record. 10. Click the Download Summary menu link to download a portable copy of your medical information. If you have questions, please visit the Frequently Asked Questions section of the fundfindr website. Remember, fundfindr is NOT to be used for urgent needs. For medical emergencies, dial 911. Now available from your iPhone and Android! Introducing Jose Roberto Anders As a Cherl Grain patient, I wanted to make you aware of our electronic visit tool called Jose Roberto Anders. [employer] 24/7 allows you to connect within minutes with a medical provider 24 hours a day, seven days a week via a mobile device or tablet or logging into a secure website from your computer. You can access Jose Roberto Anders from anywhere in the [place]. A virtual visit might be right for you when you have a simple condition and feel like you just dont want to get out of bed, or cant get away from work for an appointment, when your regular [employer] provider is not available (evenings, weekends or holidays), or when youre out of town and need minor care. Electronic visits cost only $49 and if the [employer] 24/7 provider determines a prescription is needed to treat your condition, one can be electronically transmitted to a nearby pharmacy*. Please take a moment to enroll today if you have not already done so. The enrollment process is free and takes just a few minutes.   To enroll, please download the [employer] 24/7 gaston to your tablet or phone, or visit [email or web address] to enroll on your computer. And, as an [address] [place] [address] patient with a [employer] account, the results of your visits will be scanned into your electronic medical record and your primary care provider will be able to view the scanned results. We urge you to continue to see your regular [employer] provider for your ongoing medical care. And while your primary care provider may not be the one available when you seek a [employer] virtual visit, the peace of mind you get from getting a real diagnosis real time can be priceless. For more information on IntelePeer, view our Frequently Asked Questions (FAQs) at [email or web address]. Sincerely, 
 
Monique Mitchell MD 
Chief Medical Officer [facility] Miriam Donovan *:  certain medications cannot be prescribed via Inte[employer] Unresulted Labs-Please follow up with your PCP about these lab tests Order Current Status NC XR TECHNOLOGIST SERVICE In process Providers Seen During Your Hospitalization Provider Specialty Primary office phone Ernie Marcos, [address] [facility] Orthopedic Surgery [phone] Your Primary Care Physician (PCP) Primary Care Physician Office Phone Office Fax NONE ** None ** ** None ** You are allergic to the following No active allergies Recent Documentation Height Weight BMI Smoking Status 1.905 m 86.2 kg 23.75 kg/m2 Never Smoker Emergency Contacts Name Discharge Info Relation Home Work Mobile [address] DISCHARGE CAREGIVER [3] Other Relative [6] [phone] Rolf Baldwin DISCHARGE CAREGIVER [3] Other Relative [6] [phone] Magdalene Soriano DISCHARGE CAREGIVER [3] Other Relative [6] [phone] Patient Belongings The following personal items are in your possession at time of discharge: Dental Appliances: None         Home Medications: None   Jewelry: Ring  Clothing: Shorts, Shirt, Footwear, Socks Please provide this summary of care documentation to your next provider. Signatures-by signing, you are acknowledging that this After Visit Summary has been reviewed with you and you have received a copy. Patient Signature:  ____________________________________________________________ Date:  ____________________________________________________________  
  
Andry Smiley Provider Signature:  ____________________________________________________________ Date:  ____________________________________________________________

## 2018-07-27 NOTE — INTERVAL H&P NOTE
H&P Update: 
Mirian Pete was seen and examined. History and physical has been reviewed. The patient has been examined. There have been no significant clinical changes since the completion of the originally dated History and Physical. 
Patient identified by surgeon; surgical site was confirmed by patient and surgeon.  
 
Signed By: Trenton Velazquez MD   
 July 27, 2018 9:49 AM

## 2018-07-27 NOTE — ANESTHESIA POSTPROCEDURE EVALUATION
Post-Anesthesia Evaluation and Assessment Patient: Kinsey Cortes MRN: [id number]  SSN: [id number] YOB: 1990  Age: 32 y.o. Sex: male Cardiovascular Function/Vital Signs Visit Vitals  /80  Pulse 64  Temp 36.2 °C (97.1 °F)  Resp 15  Ht 6' 3\" (1.905 m)  Wt 86.2 kg (190 lb)  SpO2 100%  BMI 23.75 kg/m2 Patient is status post general anesthesia for Procedure(s): REMOVAL OF SYNDESMOTIC SCREWS FROM RIGHT ANKLE/C-ARM. Nausea/Vomiting: None Postoperative hydration reviewed and adequate. Pain: 
Pain Scale 1: Numeric (0 - 10) (07/27/18 1125) Pain Intensity 1: 0 (07/27/18 1125) Managed Neurological Status:  
Neuro (WDL): Within Defined Limits (07/27/18 1114) At baseline Mental Status and Level of Consciousness: Alert and oriented Pulmonary Status:  
O2 Device: Room air (07/27/18 1124) Adequate oxygenation and airway patent Complications related to anesthesia: None Post-anesthesia assessment completed. No concerns Signed By: Jasper Richardson MD   
 July 27, 2018

## 2018-07-27 NOTE — ANESTHESIA PREPROCEDURE EVALUATION
Anesthetic History No history of anesthetic complications Review of Systems / Medical History Patient summary reviewed, nursing notes reviewed and pertinent labs reviewed Pulmonary Within defined limits Neuro/Psych Within defined limits Cardiovascular Pertinent negatives: No hypertension Exercise tolerance: >4 METS 
  
GI/Hepatic/Renal 
Within defined limits Endo/Other Within defined limits Other Findings Comments: Documentation of current medication Current medications obtained, documented and obtained? YES Risk Factors for Postoperative nausea/vomiting: 
     History of postoperative nausea/vomiting? NO Female? NO Motion sickness? NO Intended opioid administration for postoperative analgesia? NO Smoking Abstinence: 
Current Smoker? NO Elective Surgery? YES Seen preoperatively by anesthesiologist or proxy prior to day of surgery? YES Pt abstained from smoking 24 hours prior to anesthesia? N/A Preventive care/screening for High Blood Pressure: 
Aged 18 years and older: YES Screened for high blood pressure: YES Patients with high blood pressure referred to primary care provider 
 for BP management: YES Physical Exam 
 
Airway Mallampati: I 
TM Distance: 4 - 6 cm Neck ROM: normal range of motion Mouth opening: Normal 
 
 Cardiovascular Regular rate and rhythm,  S1 and S2 normal,  no murmur, click, rub, or gallop Rhythm: regular Rate: normal 
 
 
 
 Dental 
No notable dental hx Pulmonary Breath sounds clear to auscultation Abdominal 
GI exam deferred Other Findings Anesthetic Plan ASA: 1 Anesthesia type: general 
 
 
 
 
Induction: Intravenous Anesthetic plan and risks discussed with: Patient

## 2018-07-27 NOTE — PERIOP NOTES
Patient armband removed and shredded Patient confirmed by two identifiers with discharge instructions prior too being provided to patient.

## 2018-07-27 NOTE — ANESTHESIA POSTPROCEDURE EVALUATION
Post-Anesthesia Evaluation and Assessment Patient: Dianne Head MRN: [id number]  SSN: [id number] YOB: 1990  Age: 32 y.o. Sex: male Cardiovascular Function/Vital Signs Visit Vitals  /83 (BP 1 Location: Right arm, BP Patient Position: At rest)  Pulse 68  Temp 36.1 °C (97 °F)  Resp 16  
 Ht 6' 3\" (1.905 m)  Wt 86.2 kg (190 lb)  SpO2 100%  BMI 23.75 kg/m2 Patient is status post general anesthesia for Procedure(s): REMOVAL OF SYNDESMOTIC SCREWS FROM RIGHT ANKLE/C-ARM. Nausea/Vomiting: None Postoperative hydration reviewed and adequate. Pain: 
Pain Scale 1: Numeric (0 - 10) (07/27/18 1205) Pain Intensity 1: 0 (07/27/18 1205) Managed Neurological Status:  
Neuro (WDL): Within Defined Limits (07/27/18 1114) At baseline Mental Status and Level of Consciousness: Arousable Pulmonary Status:  
O2 Device: Room air (07/27/18 1205) Adequate oxygenation and airway patent Complications related to anesthesia: None Post-anesthesia assessment completed. No concerns Signed By: Parminder Reis MD   
 July 27, 2018

## 2018-07-27 NOTE — DISCHARGE INSTRUCTIONS
1)  Follow up with Dr. Malgorzata Mckenzie. Mihir Benjamin in 5 days. 2)  No weight right leg yet please to the Right lower extremity  3)  Elevate the Right lower extremity on 1 pillow. Place the pillow horizontal so that no pressure is on the back of your heel. 4)  Leave your current dressings and in place. 5)   Call [phone] to confirm your appointment. 6)   Keep your dressings clean and dry to the: Right lower extremity  7)  Start taking your pain medications when you get home  8 ) Follow up with Primary Care Provider in 7 to 10 days. 9)  Please call [phone] ([address]) if any: fever, shakes, chills, intractable pain, or for any questions you have regarding your care/medical condition. 10)  If you experience any calf pain or swelling, or are having any shortness of breath, chest pain, or extremity swelling, or bleeding thru any surgical dressings, or Bleeding at any body location while you are taking on any blood thinners. ie (mouth,nose, skin sites:)  Please go to closest ER  ASAP for assessment to rule out a leg clot and to assess any bleeding.    11) Start Aspirin 325 mg po each day on: sat 7/28/2018.  12) Pain Rx have been written for you Naldo Jansen. There are no outpatient Patient Instructions on file for this admission. Marcus Rooney MD  7/27/2018  11:08 AM      DISCHARGE SUMMARY from Nurse    PATIENT INSTRUCTIONS:    After general anesthesia or intravenous sedation, for 24 hours or while taking prescription Narcotics:  · Limit your activities  · Do not drive and operate hazardous machinery  · Do not make important personal or business decisions  · Do  not drink alcoholic beverages  · If you have not urinated within 8 hours after discharge, please contact your surgeon on call.     Report the following to your surgeon:  · Excessive pain, swelling, redness or odor of or around the surgical area  · Temperature over 100.5  · Nausea and vomiting lasting longer than 4 hours or if unable to take medications  · Any signs of decreased circulation or nerve impairment to extremity: change in color, persistent  numbness, tingling, coldness or increase pain  · Any questions    *  Please give a list of your current medications to your Primary Care Provider. *  Please update this list whenever your medications are discontinued, doses are      changed, or new medications (including over-the-counter products) are added. *  Please carry medication information at all times in case of emergency situations. These are general instructions for a healthy lifestyle:    No smoking/ No tobacco products/ Avoid exposure to second hand smoke  Surgeon General's Warning:  Quitting smoking now greatly reduces serious risk to your health. Obesity, smoking, and sedentary lifestyle greatly increases your risk for illness    A healthy diet, regular physical exercise & weight monitoring are important for maintaining a healthy lifestyle    You may be retaining fluid if you have a history of heart failure or if you experience any of the following symptoms:  Weight gain of 3 pounds or more overnight or 5 pounds in a week, increased swelling in our hands or feet or shortness of breath while lying flat in bed. Please call your doctor as soon as you notice any of these symptoms; do not wait until your next office visit. Recognize signs and symptoms of STROKE:    F-face looks uneven    A-arms unable to move or move unevenly    S-speech slurred or non-existent    T-time-call 911 as soon as signs and symptoms begin-DO NOT go       Back to bed or wait to see if you get better-TIME IS BRAIN. Warning Signs of HEART ATTACK     Call 911 if you have these symptoms:   Chest discomfort. Most heart attacks involve discomfort in the center of the chest that lasts more than a few minutes, or that goes away and comes back. It can feel like uncomfortable pressure, squeezing, fullness, or pain.    Discomfort in other areas of the upper body. Symptoms can include pain or discomfort in one or both arms, the back, neck, jaw, or stomach.  Shortness of breath with or without chest discomfort.  Other signs may include breaking out in a cold sweat, nausea, or lightheadedness. Don't wait more than five minutes to call 911 - MINUTES MATTER! Fast action can save your life. Calling 911 is almost always the fastest way to get lifesaving treatment. Emergency Medical Services staff can begin treatment when they arrive -- up to an hour sooner than if someone gets to the hospital by car. The discharge information has been reviewed with the patient. The patient verbalized understanding. Discharge medications reviewed with the patient and appropriate educational materials and side effects teaching were provided. Narcotic-Analgesic/Acetaminophen (Percocet, Norco, Lorcet HD, Lortab 10/325) - (By mouth)   Why this medicine is used:   Relieves pain. Contact a nurse or doctor right away if you have:  · Extreme weakness, shallow breathing, slow heartbeat  · Severe confusion, lightheadedness, dizziness, fainting  · Yellow skin or eyes, dark urine or pale stools  · Severe constipation, severe stomach pain, nausea, vomiting, loss of appetite  · Sweating or cold, clammy skin     Common side effects:  · Mild constipation, nausea, vomiting  · Sleepiness, tiredness  · Itching, rash  © 2017 [employer] Information is for End User's use only and may not be sold, redistributed or otherwise used for commercial purposes.     ___________________________________________________________________________________________________________________________________

## 2018-07-27 NOTE — H&P (VIEW-ONLY)
FOOT AND ANKLE HISTORY AND PHYSICAL Patient: Dimitrios Valverde                   MRN: [id number]         SSN: [id number] YOB: 1990                   AGE: 32 y.o. SEX: male Patient scheduled for:  Removal of syndesmotic screws from right ankle, possible placement of fibula plate and screws Date of surgery: 7/27/18 [facility]geon: Sergio Herron. MD Gloria 
ANESTHESIA TYPE:  General, Popliteal block  
  
 
 
PRESCRIPTIONS AND/OR ORDERS PROVIDED DURING H&P: 
 
Orders Placed This Encounter  [14221] Ankle Min 3V  [48037] Tib/Fib 2V  CBC WITH AUTOMATED DIFF  
 PROTHROMBIN TIME + INR  METABOLIC PANEL, COMPREHENSIVE  DRUG SCREEN UR - W/ CONFIRM  
  
  
  
 
HISTORY:  
 
The patient is a pleasant 32 y.o. male who has a history of ORIF right ankle, closed tx of proximal fibula fracture on 3/14/18. Patient [facility] with a slide on shoe. He states he could not fit into is shoes, so he has been wearing the brace with his slide on shoes. Patient reports no pain. Patient denies any fever, chills, chest pain, shortness of breath or calf pain. There are no new illness or injuries to report since last seen in the office. Patient states that he would like to go back to his job as  at [employer]. Patient undunderstands that he will not be able to return to work at this time as will require an additional surgery to remove the broken hardware in his ankle. Due to the current findings, affected activity of daily living and continued pain and discomfort, surgical intervention is indicated.  The alternatives, risks, and complications, including but not limited to infection, blood loss, need for blood transfusion, neurovascular damage, blake-incisional numbness, subcutaneous hematoma, bone fracture, anesthetic complications, DVT, PE, death, RSD, postoperative stiffness and pain, possible surgical scar, delayed healing and nonhealing, reflexive sympathetic dystrophy, damage to blood vessels and nerves, need for more surgery, MI, and stroke have been discussed. The patient understands and wishes to proceed with surgery. PAST MEDICAL HISTORY:  
 
Past Medical History:  
Diagnosis Date  Hypertension   
 pt denies this  Seasonal allergic rhinitis CURRENT MEDICATIONS:  
 
Current Outpatient Prescriptions Medication Sig Dispense Refill  celecoxib (CELEBREX) 200 mg capsule Take 1 Cap by mouth daily (with breakfast). 30 Cap 2  
 HYDROcodone-acetaminophen (NORCO) 5-325 mg per tablet One tablet by mouth BID 20 Tab 0  
 polyethylene glycol (MIRALAX) 17 gram packet Take 1 Packet by mouth daily. 10 Packet 1  promethazine (PHENERGAN) 25 mg tablet Take 1 Tab by mouth every six (6) hours as needed for Nausea. 30 Tab 0  
 aspirin (ASPIRIN) 325 mg tablet Take 1 Tab by mouth daily. 30 Tab 0 ALLERGIES:  
 
No Known Allergies SURGICAL HISTORY:  
 
Past Surgical History:  
Procedure Laterality Date  HX ANKLE FRACTURE TX Right 02/27/2018  HX ORTHOPAEDIC  02/27/2018 External Fixation right ankle SOCIAL HISTORY:  
 
Social History Social History  Marital status: SINGLE Spouse name: N/A  
 Number of children: N/A  
 Years of education: N/A Social History Main Topics  Smoking status: Never Smoker  Smokeless tobacco: Never Used  Alcohol use No  
 Drug use: No  
 Sexual activity: Not Asked Other Topics Concern  None Social History Narrative FAMILY HISTORY:  
 
Family History Problem Relation Age of Onset  Hypertension Other  Hypertension Maternal Grandmother REVIEW OF SYSTEMS:  
 
Negative for fevers, chills, chest pain, shortness of breath, weight loss, recent illness General: Negative for fever and chills. No unexpected change in weight. Denies fatigue. No change in appetite. Skin: Negative for rash or itching. HEENT: Negative for congestion, sore throat, neck pain and neck stiffness. No change in vision or hearing. Hasn't noted any enlarged lymph nodes in the neck. Cardiovascular:  Negative for chest pain and palpitations. Has not noted pedal edema. Respiratory: Negative for cough, colds, sinus, hemoptysis, shortness of breath and wheezing. Gastrointestinal: Negative for nausea and vomiting, rectal bleeding, coffee ground emesis, abdominal pain, diarrhea and constipation. Genitourinary: Negative for dysuria, frequency urgency, or burning on micturition. No flank pain, no foul smelling urine, no difficulty with initiating urination. Hematological: Negative for bleeding or easy bruising. Musculoskeletal: Negative  for arthralgias, back pain or neck pain. Neurological: Negative for dizziness, seizures or syncopal episodes. Denies headaches. Endocrine: Denies excessive thirst.  No heat/cold intolerance. Psychiatric: Negative for depression or insomnia. PHYSICAL EXAMINATION:  
 
VITALS:  
Visit Vitals  BP (!) 151/95  Pulse (!) 52  Temp 98.6 °F (37 °C) (Oral)  Resp 16  
 Ht 6' 3\" (1.905 m)  Wt 190 lb (86.2 kg)  SpO2 98%  BMI 23.75 kg/m2 GEN:  Well developed, well nourished 32 y.o. male in no acute distress. PSYCH: Alert an oriented to person, place and time. Mood, memory, affect, behavior and judgment normal 
HEENT: Normocephalic and atraumatic. Eyes: Conjunctivae and EOM are normal.Pupils are equal, round, and reactive to light. External ear normal appearance, external nose normal appearing. Mouth/Throat: Oropharynx is clear and moist, able to handle oral secretions w/out difficulty, airway patent NECK: Supple. Normal ROM, No lymphadenopathy. Trachea is midline. No bruising, swelling or deformity RESP: Clear to auscultation bilaterally. No wheezes, rales, rhonchi. Normal effort and breath sounds. No respiratory distress CARDIO: Bradycardia noted. No MGR. ABDOMEN: Soft, non-tender, non-distended, normoactive bowel sounds in all four quadrants. There is no tenderness. There is no rebound and no guarding. BACK: No CVA or spinal tenderness BREAST:  Deferred PELVIC:    Deferred RECTAL:  Deferred :           Deferred EXTREMITIES: EXAMINATION OF: right foot/ankle INCISION: incision is well healed SKIN: mild edema , no erythema, no  ecchymosis, no warmth, dry skin. TENDERNESS:  mild tenderness to palpation NEUROVASCULAR:  grossly intact. Positive distal pulses and capillary refill. DVT ASSESSMENT:  The calf is not tender to palpation. No evidence of DVT seen on physical exam. 
ROM: improving RADIOGRAPHS & DIAGNOSTIC STUDIES:  
 
X-rays of the right ankle ankle and tib/fib reveals post op changes s/p removal of external fixator and ORIF right ankle. The more proximal of the two syndesmotic screws is broken. Overall alignment is acceptable. Non-operative treatment of fibula shaft fracture that shows some healing, but no complete healing noted at this time. LABS:  
 
Pending ASSESSMENT:  
 
 
Encounter Diagnoses Name Primary?  Closed trimalleolar fracture of right ankle with routine healing, subsequent encounter Yes  Closed fracture of right tibia and fibula with routine healing, subsequent encounter  Post-operative state  Pre-op exam   
 Retained orthopedic hardware  Pre-operative examination PLAN:  
 
Again, the alternatives, risks, and complications, as well as expected outcome were discussed. The patient understands and agrees to proceed with the above listed surgery pending completion of preoperative labs and diagnostic studies. Patient has been given Hibiclens wash with instructions and prescriptions and or orders listed above.  
 
Archana Bui PA-C 
7/17/2018 
6:51 PM

## 2018-07-31 ENCOUNTER — TELEPHONE (OUTPATIENT)
Dept: ORTHOPEDIC SURGERY | Age: 28
End: 2018-07-31

## 2018-07-31 NOTE — LETTER
NOTIFICATION RETURN TO WORK 
7/31/2018 5:35 PM 
 
Mr. Andre Hilario [facility] [id number] To Whom It May Concern: 
 
Andre Hilario is currently under the care of [address] [place], [place] [address]. Due to surgery, Mr. Albina Jackson will be out of work for 6-8 weeks. If there are questions or concerns please have the patient contact our office.  
 
 
 
Sincerely, 
 
 
La Nena Jacobo MD

## 2018-07-31 NOTE — TELEPHONE ENCOUNTER
Patient called stating his job is requesting more information from Dr. Rosina Wise including how long he will be out of work and a return to work date. Please advise patient regarding this at [phone].

## 2018-07-31 NOTE — TELEPHONE ENCOUNTER
Wrote letter and let patient know that it is ready for  at our [facility] location.  Letter will need to be printed upon arrival.

## 2018-08-01 ENCOUNTER — OFFICE VISIT (OUTPATIENT)
Dept: ORTHOPEDIC SURGERY | Age: 28
End: 2018-08-01

## 2018-08-01 VITALS
HEIGHT: 75 IN | HEART RATE: 65 BPM | TEMPERATURE: 96.6 F | OXYGEN SATURATION: 99 % | SYSTOLIC BLOOD PRESSURE: 154 MMHG | RESPIRATION RATE: 16 BRPM | DIASTOLIC BLOOD PRESSURE: 92 MMHG

## 2018-08-01 DIAGNOSIS — S82.861H: ICD-10-CM

## 2018-08-01 DIAGNOSIS — Z98.890 POST-OPERATIVE STATE: ICD-10-CM

## 2018-08-01 DIAGNOSIS — S82.401D CLOSED FRACTURE OF RIGHT TIBIA AND FIBULA WITH ROUTINE HEALING, SUBSEQUENT ENCOUNTER: Primary | ICD-10-CM

## 2018-08-01 DIAGNOSIS — S82.201D CLOSED FRACTURE OF RIGHT TIBIA AND FIBULA WITH ROUTINE HEALING, SUBSEQUENT ENCOUNTER: Primary | ICD-10-CM

## 2018-08-01 RX ORDER — HYDROCODONE BITARTRATE AND ACETAMINOPHEN 5; 325 MG/1; MG/1
1-2 TABLET ORAL
Qty: 48 TAB | Refills: 1 | Status: SHIPPED | OUTPATIENT
Start: 2018-08-01

## 2018-08-01 NOTE — PROGRESS NOTES
Patient: Og Amaral                MRN: [id number]       SSN: [id number]  YOB: 1990          AGE: 32 y.o. SEX: male    PCP:None    POST OP OFFICE NOTE  DOS: 7/27/18    Chief Complaint:   Chief Complaint   Patient presents with    Ankle Pain     right ankle post op f/u        HPI:     The patient is a 32 y.o. male who presents today for follow up 5 days s/p:     REMOVAL OF SYNDESMOTIC SCREWS FROM RIGHT ANKLE    Patient has been NWB to the right lower extremity. Patient reports doing well other than post op pain. Patient denies any fever, chills, chest pain, shortness of breath or calf pain. There are no new illness or injuries to report since last seen in the office. Patient is on ASA  for DVT prophylaxis. PHYSICAL EXAM:     Visit Vitals    BP (!) 154/92    Pulse 65    Temp 96.6 °F (35.9 °C) (Oral)    Resp 16    Ht 6' 3\" (1.905 m)    SpO2 99%         Pain Scale: /10      GEN:  Alert, well developed, well nourished, well appearing 32 y.o. male in no acute distress. PSYCH:  Normal affect, mood, and conduct. alert, oriented x 3 alert, oriented x 3, no dementia  M/S EXAMINATION OF: right ankle/foot  DRESSINGS: CDI other than mild soilage on dressing as expected 5 days s/p surgery  DRAINAGE: none  INCISION: Incision looks good, skin well approximated, no dehiscence, skin staples in place without disruption. SKIN: mild edema , no erythema, mild  ecchymosis, no warmth    TENDERNESS:  mild tenderness to palpation (as expected after surgery)  NEUROVASCULAR:  grossly intact. Positive distal pulses and capillary refill. DVT ASSESSMENT:  The calf is not tender to palpation.  No evidence of DVT seen on physical exam.  ROM: not tested       RADIOGRAPHS & DIAGNOSTIC STUDIES     Results for orders placed or performed in visit on 08/01/18   AMB POC XRAY, ANKLE; COMPLETE, 3+ VIE    Narrative    [address] Donovan PA-C     8/1/2018 11:23 AM  X-rays, 3 views of the right ankle reveals post op changes s/p   removal of syndesmotic screws. Overall alignment looks good. Hardware in good position. Broken portion of proximal syndesmotic   screw remains. IMPRESSION:     Encounter Diagnoses     ICD-10-CM ICD-9-CM   1. Closed fracture of right tibia and fibula with routine healing, subsequent encounter S82.201D V54.16    S82.401D    2. Post-operative state Z98.890 V45.89   3. Type I or II open [place]ison[place]uve fracture of right lower extremity with delayed healing, subsequent encounter S82.861H V54.16       PLAN:         Orders Placed This Encounter    [23246] Ankle Min 3V    HYDROcodone-acetaminophen (NORCO) 5-325 mg per tablet                  · Keep the current dressings on and in place. You need to keep this incision clean and dry. If you have a splint or cast on please keep this clean and dry as well. · You should expect swelling and bruising in the area over the next several days. You may elevate the surgical extremity on 1 pillow. Place the pillow horizontal so that no pressure is on the back of your heel. You may apply an icepack on top of the dressing to help minimize the swelling. · Keep all pets away from  any wound present in order to prevent infection. · Continue Activity modification    · Weight bearing status:  non weight bearing to the surgical extremity    · No lifting, twisting, squatting, deep bending, driving or strenuous activity.     PLEASE SEEK IMMEDIATE ASSESSMENT BY ER PHYSICIAN IF ANY OF THE FOLLOWING EXIST:     Excessive pain, swelling, redness or odor of or around the surgical area   Temperature over 100.5   Nausea and vomiting lasting longer than 4 hours or if unable to take medications   Any signs of decreased circulation or nerve impairment to extremity: change in color, persistent numbness, tingling, coldness or increase pain   If any calf pain, calf tightness, shortness of breath, chest pain   Any difficulty breathing at rest or with ambulation, any chest tightness/soreness  Severe intractable pain, persistent swelling or drainage, development of a wound, incisional redness, finger/toe swelling or color changes, or CALF PAIN    · Perform ankle pumps with non-surgical/non-injured extremity to help reduce the risk of blood clots    · Please follow up in 1 weeks     · Continue taking Aspirin as directed      · Prescription for Julia Soriano has been provided. Please take medication as directed for severe pain            · DO NOT DRIVE WHILE TAKING NARCOTIC PAIN MEDICINE      Patient has been discussed with Dr. Addi Gómez during this visit and he agrees with the assessment and plan    Patient expresses understanding of the plan. Patient education provided on post surgical care. REVIEW OF SYSTEMS:     Otherwise as noted in HPI      PAST MEDICAL HISTORY:     Past Medical History:   Diagnosis Date    Hypertension     pt denies this    Seasonal allergic rhinitis        MEDICATIONS:     Current Outpatient Prescriptions   Medication Sig    HYDROcodone-acetaminophen (NORCO) 5-325 mg per tablet Take 1-2 Tabs by mouth every four (4) hours as needed for Pain. Max Daily Amount: 12 Tabs.  celecoxib (CELEBREX) 200 mg capsule Take 1 Cap by mouth daily (with breakfast).  polyethylene glycol (MIRALAX) 17 gram packet Take 1 Packet by mouth daily.  promethazine (PHENERGAN) 25 mg tablet Take 1 Tab by mouth every six (6) hours as needed for Nausea.  aspirin (ASPIRIN) 325 mg tablet Take 1 Tab by mouth daily. No current facility-administered medications for this visit.         ALLERGIES:     No Known Allergies      PAST SURGICAL HISTORY:     Past Surgical History:   Procedure Laterality Date    HX ANKLE FRACTURE TX Right 02/27/2018    HX ORTHOPAEDIC  02/27/2018    External Fixation right ankle    HX ORTHOPAEDIC  03/2018    hardware removeval- right ankle       SOCIAL HISTORY:     Social History     Social History    Marital status: SINGLE     Spouse name: N/A    Number of children: N/A    Years of education: N/A     Occupational History    Not on file.      Social History Main Topics    Smoking status: Never Smoker    Smokeless tobacco: Never Used    Alcohol use No    Drug use: No    Sexual activity: Not on file     Other Topics Concern    Not on file     Social History Narrative       FAMILY HISTORY:     Family History   Problem Relation Age of Onset    Hypertension Other     Hypertension Maternal Grandmother            Kevin Mckeon PA-C  8/1/2018

## 2018-08-01 NOTE — PATIENT INSTRUCTIONS
· Keep the current dressings on and in place. You need to keep this incision clean and dry. If you have a splint or cast on please keep this clean and dry as well. · You should expect swelling and bruising in the area over the next several days. You may elevate the surgical extremity on 1 pillow. Place the pillow horizontal so that no pressure is on the back of your heel. You may apply an icepack on top of the dressing to help minimize the swelling. · Keep all pets away from  any wound present in order to prevent infection. · Continue Activity modification    · Weight bearing status:  non weight bearing to the surgical extremity    · No lifting, twisting, squatting, deep bending, driving or strenuous activity. PLEASE SEEK IMMEDIATE ASSESSMENT BY ER PHYSICIAN IF ANY OF THE FOLLOWING EXIST:     Excessive pain, swelling, redness or odor of or around the surgical area   Temperature over 100.5   Nausea and vomiting lasting longer than 4 hours or if unable to take medications   Any signs of decreased circulation or nerve impairment to extremity: change in color, persistent numbness, tingling, coldness or increase pain   If any calf pain, calf tightness, shortness of breath, chest pain   Any difficulty breathing at rest or with ambulation, any chest tightness/soreness  Severe intractable pain, persistent swelling or drainage, development of a wound, incisional redness, finger/toe swelling or color changes, or CALF PAIN    · Perform ankle pumps with non-surgical/non-injured extremity to help reduce the risk of blood clots    · Please follow up in 1 weeks     · Continue taking Aspirin as directed      · Prescription for Norco 7.5/325 has been provided. Please take medication as directed for severe pain         Acute Pain After Surgery: Care Instructions  Your Care Instructions    It's common to have some pain after surgery. Pain doesn't mean that something is wrong or that the surgery didn't go well.  But when the pain is severe, it's important to work with your doctor to manage it. It's also important to be aware of a few facts about pain and pain medicine. · You are the only person who knows what your pain feels like. So be sure to tell your doctor when you are in pain or when the pain changes. Then he or she will know how to adjust your medicines. · Pain is often easier to control right after it starts. So it may be better to take regular doses of pain medicine and not wait until the pain gets bad. · Medicine can help control pain. But this doesn't mean you'll have no pain. Medicine works to keep the pain at a level you can live with. With time, you will feel better. Follow-up care is a key part of your treatment and safety. Be sure to make and go to all appointments, and call your doctor if you are having problems. It's also a good idea to know your test results and keep a list of the medicines you take. How can you care for yourself at home? · Be safe with medicines. Read and follow all instructions on the label. ¨ If the doctor gave you a prescription medicine for pain, take it as prescribed. ¨ If you are not taking a prescription pain medicine, ask your doctor if you can take an over-the-counter medicine. · If you take an over-the-counter pain medicine, such as acetaminophen (Tylenol), ibuprofen (Advil, Motrin), or naproxen (Aleve), read and follow all instructions on the label. · Do not take two or more pain medicines at the same time unless the doctor told you to. · Do not drink alcohol while you are taking pain medicines. · Try to walk each day if your doctor recommends it. Start by walking a little more than you did the day before. Bit by bit, increase the amount you walk. Walking increases blood flow. It also helps prevent pneumonia and constipation. · To prevent constipation from opioid pain medicines:  ¨ Talk to your doctor about a laxative.   ¨ Include fruits, vegetables, beans, and whole grains in your diet each day. These foods are high in fiber. ¨ Drink plenty of fluids, enough so that your urine is light yellow or clear like water. Drink water, fruit juice, or other drinks that do not contain caffeine or alcohol. If you have kidney, heart, or liver disease and have to limit fluids, talk with your doctor before you increase the amount of fluids you drink. ¨ Take a fiber supplement, such as Citrucel or Metamucil, every day if needed. Read and follow all instructions on the label. If you take pain medicine for more than a few days, talk to your doctor before you take fiber. When should you call for help? Call your doctor now or seek immediate medical care if:    · Your pain gets worse.     · Your pain is not controlled by medicine.    Watch closely for changes in your health, and be sure to contact your doctor if you have any problems. Where can you learn more? Go to http://tony-iris.info/. Enter ([phone] [phone] in the search box to learn more about \"Acute Pain After Surgery: Care Instructions. \"  Current as of: November 20, 2017  Content Version: 11.7  © 1412-5261 [employer]. Care instructions adapted under license by [employer] (which disclaims liability or warranty for this information). If you have questions about a medical condition or this instruction, always ask your healthcare professional. [address] any warranty or liability for your use of this information.

## 2018-08-01 NOTE — PROCEDURES
X-rays, 3 views of the right ankle reveals post op changes s/p removal of syndesmotic screws. Overall alignment looks good. Hardware in good position. Broken portion of proximal syndesmotic screw remains.

## 2018-08-08 DIAGNOSIS — T84.84XA PAINFUL ORTHOPAEDIC HARDWARE (HCC): Primary | ICD-10-CM

## 2018-08-15 ENCOUNTER — OFFICE VISIT (OUTPATIENT)
Dept: ORTHOPEDIC SURGERY | Age: 28
End: 2018-08-15

## 2018-08-15 VITALS
HEIGHT: 75 IN | SYSTOLIC BLOOD PRESSURE: 147 MMHG | TEMPERATURE: 97.8 F | HEART RATE: 60 BPM | OXYGEN SATURATION: 98 % | DIASTOLIC BLOOD PRESSURE: 94 MMHG | RESPIRATION RATE: 16 BRPM

## 2018-08-15 DIAGNOSIS — S82.201D CLOSED FRACTURE OF RIGHT TIBIA AND FIBULA WITH ROUTINE HEALING, SUBSEQUENT ENCOUNTER: Primary | ICD-10-CM

## 2018-08-15 DIAGNOSIS — S82.401D CLOSED FRACTURE OF RIGHT TIBIA AND FIBULA WITH ROUTINE HEALING, SUBSEQUENT ENCOUNTER: Primary | ICD-10-CM

## 2018-08-15 DIAGNOSIS — Z98.890 POST-OPERATIVE STATE: ICD-10-CM

## 2018-08-15 NOTE — PROGRESS NOTES
Patient: Hong Herrera                MRN: [id number]       SSN: [id number]  YOB: 1990          AGE: 32 y.o. SEX: male    PCP:None    POST OP OFFICE NOTE  DOS: 7/27/18    Chief Complaint:   Chief Complaint   Patient presents with    Ankle Pain     RIGHT ANKLE F/U        HPI:     The patient is a 32 y.o. male who presents today for follow up 19 days s/p:     REMOVAL OF SYNDESMOTIC SCREWS FROM RIGHT ANKLE    Patient has been NWB to the right lower extremity. Patient reports doing well other than post op pain. Patient denies any fever, chills, chest pain, shortness of breath or calf pain. There are no new illness or injuries to report since last seen in the office. Patient is on ASA  for DVT prophylaxis. PHYSICAL EXAM:     Visit Vitals    BP (!) 147/94    Pulse 60    Temp 97.8 °F (36.6 °C) (Oral)    Resp 16    Ht 6' 3\" (1.905 m)    SpO2 98%         Pain Scale: /10      GEN:  Alert, well developed, well nourished, well appearing 32 y.o. male in no acute distress. PSYCH:  Normal affect, mood, and conduct. alert, oriented x 3 alert, oriented x 3, no dementia  M/S EXAMINATION OF: right ankle/foot  DRESSINGS: CDI   DRAINAGE: none  INCISION: Incision looks good, skin well approximated, no dehiscence, skin staples in place without disruption. SKIN: mild edema , no erythema, no  ecchymosis, no warmth    TENDERNESS:  no tenderness to palpation  NEUROVASCULAR:  grossly intact. Positive distal pulses and capillary refill. DVT ASSESSMENT:  The calf is not tender to palpation. No evidence of DVT seen on physical exam.  ROM: not tested       RADIOGRAPHS & DIAGNOSTIC STUDIES     Results for orders placed or performed in visit on 08/15/18   AMB POC XRAY, ANKLE; COMPLETE, 3+ VIE    Narrative    Maxime Diaz PA-C     8/15/2018 11:18 AM  X-rays, 3 views of the right ankle reveals post op changes s/p   removal of syndesmotic screws. Overall alignment looks good.    Hardware in good position. Broken portion of proximal syndesmotic   screw remains. IMPRESSION:     Encounter Diagnoses     ICD-10-CM ICD-9-CM   1. Closed fracture of right tibia and fibula with routine healing, subsequent encounter S82.201D V54.16    S82.401D    2. Post-operative state Z98.890 V45.89       PLAN:         · Follow up with your Primary Care Provider regarding elevated BP    · Continue Activity modification    · Weight bearing status:   weight bearing as tolerated in CAM boot    · No lifting, twisting, squatting, deep bending, driving or strenuous activity. PLEASE SEEK IMMEDIATE ASSESSMENT BY ER PHYSICIAN IF ANY OF THE FOLLOWING EXIST:     Excessive pain, swelling, redness or odor of or around the surgical area   Temperature over 100.5   Nausea and vomiting lasting longer than 4 hours or if unable to take medications   Any signs of decreased circulation or nerve impairment to extremity: change in color, persistent numbness, tingling, coldness or increase pain   If any calf pain, calf tightness, shortness of breath, chest pain   Any difficulty breathing at rest or with ambulation, any chest tightness/soreness  Severe intractable pain, persistent swelling or drainage, development of a wound, incisional redness, finger/toe swelling or color changes, or CALF PAIN    · Work note provided for patient to wear CAM boot to work    · Please follow up in 4 weeks     · Continue taking Aspirin as directed      · Prescription for Norco 7.5/325 has been provided. Please take medication as directed for severe pain            Orders Placed This Encounter    Generic Supply Order    [23590] Ankle Min 3V              Patient has been discussed with Dr. Ava Guerrero during this visit and he agrees with the assessment and plan    Patient expresses understanding of the plan. Patient education provided on post surgical care.       REVIEW OF SYSTEMS:     Otherwise as noted in HPI      PAST MEDICAL HISTORY:     Past Medical History: Diagnosis Date    Hypertension     pt denies this    Seasonal allergic rhinitis        MEDICATIONS:     Current Outpatient Prescriptions   Medication Sig    HYDROcodone-acetaminophen (NORCO) 5-325 mg per tablet Take 1-2 Tabs by mouth every four (4) hours as needed for Pain. Max Daily Amount: 12 Tabs.  celecoxib (CELEBREX) 200 mg capsule Take 1 Cap by mouth daily (with breakfast).  polyethylene glycol (MIRALAX) 17 gram packet Take 1 Packet by mouth daily.  promethazine (PHENERGAN) 25 mg tablet Take 1 Tab by mouth every six (6) hours as needed for Nausea.  aspirin (ASPIRIN) 325 mg tablet Take 1 Tab by mouth daily. No current facility-administered medications for this visit. ALLERGIES:     No Known Allergies      PAST SURGICAL HISTORY:     Past Surgical History:   Procedure Laterality Date    HX ANKLE FRACTURE TX Right 02/27/2018    HX ORTHOPAEDIC  02/27/2018    External Fixation right ankle    HX ORTHOPAEDIC  03/2018    hardware removeval- right ankle       SOCIAL HISTORY:     Social History     Social History    Marital status: SINGLE     Spouse name: N/A    Number of children: N/A    Years of education: N/A     Occupational History    Not on file.      Social History Main Topics    Smoking status: Never Smoker    Smokeless tobacco: Never Used    Alcohol use No    Drug use: No    Sexual activity: Not on file     Other Topics Concern    Not on file     Social History Narrative       FAMILY HISTORY:     Family History   Problem Relation Age of Onset    Hypertension Other     Hypertension Maternal Grandmother            Trisha Mcqueen PA-C  8/15/2018

## 2018-08-15 NOTE — LETTER
NOTIFICATION RETURN TO WORK / SCHOOL 
 
8/21/2018 3:17 PM 
 
Mr. Mirian Pete [address] [place] [id number] To Whom It May Concern: 
 
Mirian Pete is currently under the care of [address] [place] [address] Nathaniel Newby. He will return to work/school on: 8-21-18 with the following restrictions: 
 Patient can wear the ASAC brace in his supportive sneaker or lace-up boot. 
  
 
If there are questions or concerns please have the patient contact our office. Sincerely, Jose Angel Hernandez PA-C

## 2018-08-15 NOTE — LETTER
NOTIFICATION RETURN TO WORK / SCHOOL 
 
8/15/2018 11:22 AM 
 
Mr. Chadwick Bonilla [facility] [address] To Whom It May Concern: 
 
Chadwick Bonilla is currently under the care of [address] [place] [address] Nathaniel Newby. He will return to work/school on: 9-20-18 with the following restrictions: 
         Mr. Vi Maldonado must wear CAM boot. If there are questions or concerns please have the patient contact our office. Sincerely, Artur Cesar PA-C

## 2018-08-15 NOTE — MR AVS SNAPSHOT
[address] [place] [address], [address] [facility] 
[phone] Patient: Adam Mcclure MRN: [id number] :1990 Visit Information Date & Time Provider Department Dept. Phone Encounter #  
 8/15/2018 10:45 AM Mike Pierce [address] [facility] [place] [id number] Follow-up Instructions Return in about 3 weeks (around 2018) for follow up evaluation. Upcoming Health Maintenance Date Due DTaP/Tdap/Td series (1 - Tdap) 10/10/2011 Influenza Age 5 to Adult 2018 Allergies as of 8/15/2018  Review Complete On: 8/15/2018 By: Mike Pierce PA-C No Known Allergies Current Immunizations  Never Reviewed No immunizations on file. Not reviewed this visit You Were Diagnosed With   
  
 Codes Comments Closed fracture of right tibia and fibula with routine healing, subsequent encounter    -  Primary ICD-10-CM: S82.201D, S82.401D ICD-9-CM: V54.16 Post-operative state     ICD-10-CM: J08.713 ICD-9-CM: V45.89 Vitals BP Pulse Temp Resp Height(growth percentile) SpO2  
 (!) 147/94 60 97.8 °F (36.6 °C) (Oral) 16 6' 3\" (1.905 m) 98% Smoking Status Never Smoker Vitals History Preferred Pharmacy Pharmacy Name Phone [address] [phone] Your Updated Medication List  
  
   
This list is accurate as of 8/15/18 11:23 AM.  Always use your most recent med list.  
  
  
  
  
 aspirin 325 mg tablet Commonly known as:  ASPIRIN Take 1 Tab by mouth daily. celecoxib 200 mg capsule Commonly known as:  CeleBREX Take 1 Cap by mouth daily (with breakfast). HYDROcodone-acetaminophen 5-325 mg per tablet Commonly known as:  Zia Songster Take 1-2 Tabs by mouth every four (4) hours as needed for Pain. Max Daily Amount: 12 Tabs. polyethylene glycol 17 gram packet Commonly known as:  Idalia Look Take 1 Packet by mouth daily. promethazine 25 mg tablet Commonly known as:  PHENERGAN Take 1 Tab by mouth every six (6) hours as needed for Nausea. We Performed the Following AMB POC XRAY, ANKLE; COMPLETE, 3+ VIE [60254 CPT(R)] AMB SUPPLY ORDER [[id number] Custom] Comments:  
 8/15/2018 11:16 AM 
 
Short CAM boot, right Follow-up Instructions Return in about 3 weeks (around 9/5/2018) for follow up evaluation. Patient Instructions · Follow up with your Primary Care Provider regarding elevated BP 
 
· Continue Activity modification · Weight bearing status:   weight bearing as tolerated in CAM boot · No lifting, twisting, squatting, deep bending, driving or strenuous activity. PLEASE SEEK IMMEDIATE ASSESSMENT BY ER PHYSICIAN IF ANY OF THE FOLLOWING EXIST:  
 
Excessive pain, swelling, redness or odor of or around the surgical area Temperature over 100.5 Nausea and vomiting lasting longer than 4 hours or if unable to take medications Any signs of decreased circulation or nerve impairment to extremity: change in color, persistent numbness, tingling, coldness or increase pain If any calf pain, calf tightness, shortness of breath, chest pain Any difficulty breathing at rest or with ambulation, any chest tightness/soreness Severe intractable pain, persistent swelling or drainage, development of a wound, incisional redness, finger/toe swelling or color changes, or CALF PAIN 
 
· Work note provided for patient to wear CAM boot to work · Please follow up in 4 weeks · Continue taking Aspirin as directed · Prescription for Norco 7.5/325 has been provided. Please take medication as directed for severe pain 
 
 
  
Acute Pain After Surgery: Care Instructions Your Care Instructions It's common to have some pain after surgery.  Pain doesn't mean that something is wrong or that the surgery didn't go well. But when the pain is severe, it's important to work with your doctor to manage it. It's also important to be aware of a few facts about pain and pain medicine. · You are the only person who knows what your pain feels like. So be sure to tell your doctor when you are in pain or when the pain changes. Then he or she will know how to adjust your medicines. · Pain is often easier to control right after it starts. So it may be better to take regular doses of pain medicine and not wait until the pain gets bad. · Medicine can help control pain. But this doesn't mean you'll have no pain. Medicine works to keep the pain at a level you can live with. With time, you will feel better. Follow-up care is a key part of your treatment and safety. Be sure to make and go to all appointments, and call your doctor if you are having problems. It's also a good idea to know your test results and keep a list of the medicines you take. How can you care for yourself at home? · Be safe with medicines. Read and follow all instructions on the label. ¨ If the doctor gave you a prescription medicine for pain, take it as prescribed. ¨ If you are not taking a prescription pain medicine, ask your doctor if you can take an over-the-counter medicine. · If you take an over-the-counter pain medicine, such as acetaminophen (Tylenol), ibuprofen (Advil, Motrin), or naproxen (Aleve), read and follow all instructions on the label. · Do not take two or more pain medicines at the same time unless the doctor told you to. · Do not drink alcohol while you are taking pain medicines. · Try to walk each day if your doctor recommends it. Start by walking a little more than you did the day before. Bit by bit, increase the amount you walk. Walking increases blood flow. It also helps prevent pneumonia and constipation. · To prevent constipation from opioid pain medicines: ¨ Talk to your doctor about a laxative. ¨ Include fruits, vegetables, beans, and whole grains in your diet each day. These foods are high in fiber. ¨ Drink plenty of fluids, enough so that your urine is light yellow or clear like water. Drink water, fruit juice, or other drinks that do not contain caffeine or alcohol. If you have kidney, heart, or liver disease and have to limit fluids, talk with your doctor before you increase the amount of fluids you drink. ¨ Take a fiber supplement, such as Citrucel or Metamucil, every day if needed. Read and follow all instructions on the label. If you take pain medicine for more than a few days, talk to your doctor before you take fiber. When should you call for help? Call your doctor now or seek immediate medical care if: 
  · Your pain gets worse.  
  · Your pain is not controlled by medicine.  
 Watch closely for changes in your health, and be sure to contact your doctor if you have any problems. Where can you learn more? Go to http://tony-iris.info/. Enter ([phone] [phone] in the search box to learn more about \"Acute Pain After Surgery: Care Instructions. \" Current as of: November 20, 2017 Content Version: 11.7 © 5667-0580 [employer]ia[employer]. Care instructions adapted under license by [employer] (which disclaims liability or warranty for this information). If you have questions about a medical condition or this instruction, always ask your healthcare professional. [place] [address] any warranty or liability for your use of this information. Introducing [facility]! Ha Szymanski introduces [employer] patient portal. Now you can access parts of your medical record, email your doctor's office, and request medication refills online. 1. In your internet browser, go to [email or web address] 2. Click on the First Time User? Click Here link in the Sign In box.  You will see the New Member Sign Up page. 3. Enter your Edictive Access Code exactly as it appears below. You will not need to use this code after youve completed the sign-up process. If you do not sign up before the expiration date, you must request a new code. · Edictive Access Code: [email or web address] Expires: 9/19/2018 11:37 AM 
 
4. Enter the last four digits of your Social Security Number (xxxx) and Date of Birth (mm/dd/yyyy) as indicated and click Submit. You will be taken to the next sign-up page. 5. Create a Edictive ID. This will be your Edictive login ID and cannot be changed, so think of one that is secure and easy to remember. 6. Create a Edictive password. You can change your password at any time. 7. Enter your Password Reset Question and Answer. This can be used at a later time if you forget your password. 8. Enter your e-mail address. You will receive e-mail notification when new information is available in [address]. 9. Click Sign Up. You can now view and download portions of your medical record. 10. Click the Download Summary menu link to download a portable copy of your medical information. If you have questions, please visit the Frequently Asked Questions section of the Edictive website. Remember, Edictive is NOT to be used for urgent needs. For medical emergencies, dial 911. Now available from your iPhone and Android! Please provide this summary of care documentation to your next provider. Your primary care clinician is listed as NONE. If you have any questions after today's visit, please call [phone].

## 2018-08-15 NOTE — PATIENT INSTRUCTIONS
· Follow up with your Primary Care Provider regarding elevated BP    · Continue Activity modification    · Weight bearing status:   weight bearing as tolerated in CAM boot    · No lifting, twisting, squatting, deep bending, driving or strenuous activity. PLEASE SEEK IMMEDIATE ASSESSMENT BY ER PHYSICIAN IF ANY OF THE FOLLOWING EXIST:     Excessive pain, swelling, redness or odor of or around the surgical area   Temperature over 100.5   Nausea and vomiting lasting longer than 4 hours or if unable to take medications   Any signs of decreased circulation or nerve impairment to extremity: change in color, persistent numbness, tingling, coldness or increase pain   If any calf pain, calf tightness, shortness of breath, chest pain   Any difficulty breathing at rest or with ambulation, any chest tightness/soreness  Severe intractable pain, persistent swelling or drainage, development of a wound, incisional redness, finger/toe swelling or color changes, or CALF PAIN    · Work note provided for patient to wear CAM boot to work    · Please follow up in 4 weeks     · Continue taking Aspirin as directed      · Prescription for Norco 7.5/325 has been provided. Please take medication as directed for severe pain         Acute Pain After Surgery: Care Instructions  Your Care Instructions    It's common to have some pain after surgery. Pain doesn't mean that something is wrong or that the surgery didn't go well. But when the pain is severe, it's important to work with your doctor to manage it. It's also important to be aware of a few facts about pain and pain medicine. · You are the only person who knows what your pain feels like. So be sure to tell your doctor when you are in pain or when the pain changes. Then he or she will know how to adjust your medicines. · Pain is often easier to control right after it starts. So it may be better to take regular doses of pain medicine and not wait until the pain gets bad.   · Medicine can help control pain. But this doesn't mean you'll have no pain. Medicine works to keep the pain at a level you can live with. With time, you will feel better. Follow-up care is a key part of your treatment and safety. Be sure to make and go to all appointments, and call your doctor if you are having problems. It's also a good idea to know your test results and keep a list of the medicines you take. How can you care for yourself at home? · Be safe with medicines. Read and follow all instructions on the label. ¨ If the doctor gave you a prescription medicine for pain, take it as prescribed. ¨ If you are not taking a prescription pain medicine, ask your doctor if you can take an over-the-counter medicine. · If you take an over-the-counter pain medicine, such as acetaminophen (Tylenol), ibuprofen (Advil, Motrin), or naproxen (Aleve), read and follow all instructions on the label. · Do not take two or more pain medicines at the same time unless the doctor told you to. · Do not drink alcohol while you are taking pain medicines. · Try to walk each day if your doctor recommends it. Start by walking a little more than you did the day before. Bit by bit, increase the amount you walk. Walking increases blood flow. It also helps prevent pneumonia and constipation. · To prevent constipation from opioid pain medicines:  ¨ Talk to your doctor about a laxative. ¨ Include fruits, vegetables, beans, and whole grains in your diet each day. These foods are high in fiber. ¨ Drink plenty of fluids, enough so that your urine is light yellow or clear like water. Drink water, fruit juice, or other drinks that do not contain caffeine or alcohol. If you have kidney, heart, or liver disease and have to limit fluids, talk with your doctor before you increase the amount of fluids you drink. ¨ Take a fiber supplement, such as Citrucel or Metamucil, every day if needed. Read and follow all instructions on the label.  If you take pain medicine for more than a few days, talk to your doctor before you take fiber. When should you call for help? Call your doctor now or seek immediate medical care if:    · Your pain gets worse.     · Your pain is not controlled by medicine.    Watch closely for changes in your health, and be sure to contact your doctor if you have any problems. Where can you learn more? Go to http://tony-iris.info/. Enter ([phone] [phone] in the search box to learn more about \"Acute Pain After Surgery: Care Instructions. \"  Current as of: November 20, 2017  Content Version: 11.7  © 8211-0266 [employer]. Care instructions adapted under license by [employer] (which disclaims liability or warranty for this information). If you have questions about a medical condition or this instruction, always ask your healthcare professional. [address] any warranty or liability for your use of this information.

## 2018-08-21 ENCOUNTER — TELEPHONE (OUTPATIENT)
Dept: ORTHOPEDIC SURGERY | Age: 28
End: 2018-08-21

## 2018-08-21 NOTE — LETTER
NOTIFICATION RETURN TO WORK / SCHOOL 
 
8/21/2018 8:49 AM 
 
Mr. Paco Penn [address] [facility] [address] To Whom It May Concern: 
 
Paco Penn is currently under the care of [address] [place], [place] [address]. He will return to work/school on: 8-20-18 with the following restrictions: 
         Mr. Meagan Motley must wear CAM boot. If there are questions or concerns please have the patient contact our office.  
 
 
 
Sincerely, 
 
 
Thomas Sher MD

## 2018-08-21 NOTE — TELEPHONE ENCOUNTER
Patient called stating he was sent home from work yesterday 08/20/18 because the doctor's note Alvino Yao gave to him said he can't go back to work until 09/20/18 when it was supposed to sat 08/20/18. He is asking for the corrected letter to be faxed to his job at [phone].

## 2018-08-21 NOTE — TELEPHONE ENCOUNTER
Patient can wear the [facility] brace in his supportive sneaker or lace-up boot.     Sharron Bailey PA-C  8/21/2018   1:14 PM

## 2018-08-21 NOTE — TELEPHONE ENCOUNTER
Patient called back as his job told him he could not return to work while wearing the boot. Patient worked yesterday with out the boot so he could stay at work. He did not have any pain with just wearing his athletic shoes. He need new letter that he can go back to work with out boot. Please fax new note to his job at [phone].  Please call him when note is faxed at [phone]

## 2018-09-05 ENCOUNTER — OFFICE VISIT (OUTPATIENT)
Dept: ORTHOPEDIC SURGERY | Age: 28
End: 2018-09-05

## 2018-09-05 VITALS
HEART RATE: 61 BPM | SYSTOLIC BLOOD PRESSURE: 150 MMHG | WEIGHT: 201 LBS | TEMPERATURE: 97.6 F | HEIGHT: 75 IN | OXYGEN SATURATION: 98 % | BODY MASS INDEX: 24.99 KG/M2 | RESPIRATION RATE: 16 BRPM | DIASTOLIC BLOOD PRESSURE: 94 MMHG

## 2018-09-05 DIAGNOSIS — Z98.890 POST-OPERATIVE STATE: ICD-10-CM

## 2018-09-05 DIAGNOSIS — S82.401D CLOSED FRACTURE OF RIGHT TIBIA AND FIBULA WITH ROUTINE HEALING, SUBSEQUENT ENCOUNTER: Primary | ICD-10-CM

## 2018-09-05 DIAGNOSIS — S82.201D CLOSED FRACTURE OF RIGHT TIBIA AND FIBULA WITH ROUTINE HEALING, SUBSEQUENT ENCOUNTER: Primary | ICD-10-CM

## 2018-09-05 NOTE — LETTER
NOTIFICATION RETURN TO WORK 
 
9/5/2018 9:38 AM 
 
Mr. Nacho Chapman [address] [facility] [address] To Whom It May Concern: 
 
Nacho Chapman is currently under the care of [address] [place] [address] Nathaniel Newby. Please allow patient to return to work with no restrictions. He no longer needs to wear the CAM boot or the ASAC brace. If there are questions or concerns please have the patient contact our office. Sincerely, [address] Carl[address] Donovan PA-C

## 2018-09-05 NOTE — PROGRESS NOTES
Patient: Bibiana Gale                MRN: [id number]       SSN: [id number]  YOB: 1990          AGE: 32 y.o. SEX: male    PCP:None    POST OP OFFICE NOTE  DOS: 7/27/18    Chief Complaint:   Chief Complaint   Patient presents with    Ankle Pain     right ankle f/u        HPI:     The patient is a 32 y.o. male who presents today for follow up 40 days s/p:     REMOVAL OF SYNDESMOTIC SCREWS FROM RIGHT ANKLE    Patient has been NWB to the right lower extremity. Patient reports doing well with no pain and has returned to his normal work schedule without difficulty. Patient states that he has been wearing the ankle brace every day. PHYSICAL EXAM:     Visit Vitals    BP (!) 150/94    Pulse 61    Temp 97.6 °F (36.4 °C) (Oral)    Resp 16    Ht 6' 3\" (1.905 m)    Wt 201 lb (91.2 kg)    SpO2 98%    BMI 25.12 kg/m2         Pain Scale: /10      GEN:  Alert, well developed, well nourished, well appearing 32 y.o. male in no acute distress. PSYCH:  Normal affect, mood, and conduct. alert, oriented x 3 alert, oriented x 3, no dementia  M/S EXAMINATION OF: right ankle/foot  INCISION: Incision looks good, skin well healed. SKIN: no edema , no erythema, no  ecchymosis, no warmth    TENDERNESS:  no tenderness to palpation  NEUROVASCULAR:  grossly intact. Positive distal pulses and capillary refill. DVT ASSESSMENT:  The calf is not tender to palpation. No evidence of DVT seen on physical exam.  ROM: WNL       RADIOGRAPHS & DIAGNOSTIC STUDIES     No results found for any visits on 09/05/18. IMPRESSION:     Encounter Diagnoses     ICD-10-CM ICD-9-CM   1. Closed fracture of right tibia and fibula with routine healing, subsequent encounter S82.201D V54.16    S82.401D    2.  Post-operative state Z98.890 V45.89       PLAN:         · Work note provided for return to full duty    · Please follow up in 2 months - X-rays of the right ankle at [facility]    · Progress activity as tolerated            No orders of the defined types were placed in this encounter.             Patient has been discussed with Dr. Ava Guerrero during this visit and he agrees with the assessment and plan    Patient expresses understanding of the plan. Patient education provided on post surgical care. REVIEW OF SYSTEMS:     Otherwise as noted in HPI      PAST MEDICAL HISTORY:     Past Medical History:   Diagnosis Date    Hypertension     pt denies this    Seasonal allergic rhinitis        MEDICATIONS:     Current Outpatient Prescriptions   Medication Sig    aspirin (ASPIRIN) 325 mg tablet Take 1 Tab by mouth daily.  HYDROcodone-acetaminophen (NORCO) 5-325 mg per tablet Take 1-2 Tabs by mouth every four (4) hours as needed for Pain. Max Daily Amount: 12 Tabs.  celecoxib (CELEBREX) 200 mg capsule Take 1 Cap by mouth daily (with breakfast).  polyethylene glycol (MIRALAX) 17 gram packet Take 1 Packet by mouth daily.  promethazine (PHENERGAN) 25 mg tablet Take 1 Tab by mouth every six (6) hours as needed for Nausea. No current facility-administered medications for this visit. ALLERGIES:     No Known Allergies      PAST SURGICAL HISTORY:     Past Surgical History:   Procedure Laterality Date    HX ANKLE FRACTURE TX Right 02/27/2018    HX ORTHOPAEDIC  02/27/2018    External Fixation right ankle    HX ORTHOPAEDIC  03/2018    hardware removeval- right ankle       SOCIAL HISTORY:     Social History     Social History    Marital status: SINGLE     Spouse name: N/A    Number of children: N/A    Years of education: N/A     Occupational History    Not on file.      Social History Main Topics    Smoking status: Never Smoker    Smokeless tobacco: Never Used    Alcohol use No    Drug use: No    Sexual activity: Not on file     Other Topics Concern    Not on file     Social History Narrative       FAMILY HISTORY:     Family History   Problem Relation Age of Onset    Hypertension Other     Hypertension Maternal Grandmother            Emma Johnson PA-C  9/5/2018

## 2018-11-05 ENCOUNTER — OFFICE VISIT (OUTPATIENT)
Dept: ORTHOPEDIC SURGERY | Age: 28
End: 2018-11-05

## 2018-11-05 VITALS
BODY MASS INDEX: 25.56 KG/M2 | TEMPERATURE: 97.1 F | SYSTOLIC BLOOD PRESSURE: 126 MMHG | RESPIRATION RATE: 16 BRPM | HEART RATE: 62 BPM | DIASTOLIC BLOOD PRESSURE: 84 MMHG | HEIGHT: 75 IN | OXYGEN SATURATION: 100 % | WEIGHT: 205.6 LBS

## 2018-11-05 DIAGNOSIS — S82.201D CLOSED FRACTURE OF RIGHT TIBIA AND FIBULA WITH ROUTINE HEALING, SUBSEQUENT ENCOUNTER: ICD-10-CM

## 2018-11-05 DIAGNOSIS — Z98.890 POST-OPERATIVE STATE: ICD-10-CM

## 2018-11-05 DIAGNOSIS — S82.401D CLOSED FRACTURE OF RIGHT TIBIA AND FIBULA WITH ROUTINE HEALING, SUBSEQUENT ENCOUNTER: ICD-10-CM

## 2018-11-05 DIAGNOSIS — M25.571 ACUTE RIGHT ANKLE PAIN: Primary | ICD-10-CM

## 2018-11-05 NOTE — PROGRESS NOTES
Patient: Nadia Villanueva                MRN: [id number]       SSN: [id number]  YOB: 1990          AGE: 29 y.o. SEX: male    PCP:None    POST OP OFFICE NOTE  DOS: 7/27/18    Chief Complaint:   Chief Complaint   Patient presents with    Ankle Pain     right ankle pain       HPI:     The patient is a 29 y.o. male who presents today for follow up 101 days s/p:     REMOVAL OF SYNDESMOTIC SCREWS FROM RIGHT ANKLE    Patient has been WBAT to the right lower extremity. Patient reports doing well with no pain and has returned to his normal activities without difficulty. PHYSICAL EXAM:     Visit Vitals  /84   Pulse 62   Temp 97.1 °F (36.2 °C) (Oral)   Resp 16   Ht 6' 3\" (1.905 m)   Wt 205 lb 9.6 oz (93.3 kg)   SpO2 100%   BMI 25.70 kg/m²         Pain Scale: /10      GEN:  Alert, well developed, well nourished, well appearing 29 y.o. male in no acute distress. PSYCH:  Normal affect, mood, and conduct. alert, oriented x 3 alert, oriented x 3, no dementia  M/S EXAMINATION OF: right ankle/foot  INCISION: Incision looks good, skin well healed. SKIN: no edema , no erythema, no  ecchymosis, no warmth    TENDERNESS:  no tenderness to palpation  NEUROVASCULAR:  grossly intact. Positive distal pulses and capillary refill. DVT ASSESSMENT:  The calf is not tender to palpation. No evidence of DVT seen on physical exam.  ROM: WNL       RADIOGRAPHS & DIAGNOSTIC STUDIES     Results for orders placed or performed in visit on 11/05/18   AMB POC XRAY, ANKLE; COMPLETE, 3+ VIE    Narrative    Suleiman Andrade PA-C     11/5/2018 11:31 AM  X-rays, 3 views of the right ankle reveals post op changes s/p   removal of syndesmotic screws. Overall alignment looks good. Hardware in good position. Broken portion of proximal syndesmotic   screw remains. IMPRESSION:     Encounter Diagnoses     ICD-10-CM ICD-9-CM   1. Acute right ankle pain M25.571 719.47     338.19   2.  Closed fracture of right tibia and fibula with routine healing, subsequent encounter S82.201D V54.16    S82.401D    3. Post-operative state Z98.890 V45.89       PLAN:           · Please follow upas needed    · Progress activity as tolerated            Orders Placed This Encounter    [89998] Ankle Min 3V              Patient has been discussed with Dr. Satya Mccauley during this visit and he agrees with the assessment and plan    Patient expresses understanding of the plan. Patient education provided on post surgical care. REVIEW OF SYSTEMS:     Otherwise as noted in HPI      PAST MEDICAL HISTORY:     Past Medical History:   Diagnosis Date    Hypertension     pt denies this    Seasonal allergic rhinitis        MEDICATIONS:     Current Outpatient Medications   Medication Sig    HYDROcodone-acetaminophen (NORCO) 5-325 mg per tablet Take 1-2 Tabs by mouth every four (4) hours as needed for Pain. Max Daily Amount: 12 Tabs.  celecoxib (CELEBREX) 200 mg capsule Take 1 Cap by mouth daily (with breakfast).  polyethylene glycol (MIRALAX) 17 gram packet Take 1 Packet by mouth daily.  promethazine (PHENERGAN) 25 mg tablet Take 1 Tab by mouth every six (6) hours as needed for Nausea.  aspirin (ASPIRIN) 325 mg tablet Take 1 Tab by mouth daily. No current facility-administered medications for this visit.         ALLERGIES:     No Known Allergies      PAST SURGICAL HISTORY:     Past Surgical History:   Procedure Laterality Date    HX ANKLE FRACTURE TX Right 02/27/2018    HX ORTHOPAEDIC  02/27/2018    External Fixation right ankle    HX ORTHOPAEDIC  03/2018    hardware removeval- right ankle       SOCIAL HISTORY:     Social History     Socioeconomic History    Marital status: SINGLE     Spouse name: Not on file    Number of children: Not on file    Years of education: Not on file    Highest education level: Not on file   Social Needs    Financial resource strain: Not on file    Food insecurity - worry: Not on file    Food insecurity - inability: Not on file    Transportation needs - medical: Not on file   Simtrol needs - non-medical: Not on file   Occupational History    Not on file   Tobacco Use    Smoking status: Never Smoker    Smokeless tobacco: Never Used   Substance and Sexual Activity    Alcohol use: No    Drug use: No    Sexual activity: Not on file   Other Topics Concern    Not on file   Social History Narrative    Not on file       FAMILY HISTORY:     Family History   Problem Relation Age of Onset    Hypertension Other     Hypertension Maternal Grandmother            Pino Brennan PA-C  11/5/2018

## 2018-11-21 ENCOUNTER — OFFICE VISIT (OUTPATIENT)
Dept: ORTHOPEDIC SURGERY | Age: 28
End: 2018-11-21

## 2018-11-21 VITALS
HEIGHT: 75 IN | TEMPERATURE: 98 F | OXYGEN SATURATION: 96 % | BODY MASS INDEX: 25.41 KG/M2 | HEART RATE: 68 BPM | WEIGHT: 204.4 LBS | DIASTOLIC BLOOD PRESSURE: 80 MMHG | SYSTOLIC BLOOD PRESSURE: 138 MMHG | RESPIRATION RATE: 16 BRPM

## 2018-11-21 DIAGNOSIS — S93.401A SPRAIN OF RIGHT ANKLE, UNSPECIFIED LIGAMENT, INITIAL ENCOUNTER: Primary | ICD-10-CM

## 2018-11-21 DIAGNOSIS — M79.604 RIGHT LEG PAIN: ICD-10-CM

## 2018-11-21 DIAGNOSIS — M25.571 ACUTE RIGHT ANKLE PAIN: ICD-10-CM

## 2018-11-21 NOTE — PATIENT INSTRUCTIONS
· Take OTC pain medicine as instructed as needed. Follow RICE protocol listed below    · Continue Activity modification    · Weight bearing status:   weight bearing as tolerated in supportive shoe using ASA brace    · Work note provided for patient to wear ASAC brace t to work    · Please follow up in 2 weeks     · Continue taking Aspirin as directed      · Prescription for Norco 7.5/325 has been provided. Please take medication as directed for severe pain            Learning About RICE (Rest, Ice, Compression, and Elevation)  What is RICE? RICE is a way to care for an injury. RICE helps relieve pain and swelling. It may also help with healing and flexibility. RICE stands for:  · Rest and protect the injured or sore area. · Ice or a cold pack used as soon as possible. · Compression, or wrapping the injured or sore area with an elastic bandage. · Elevation (propping up) the injured or sore area. How do you do RICE? You can use RICE for home treatment when you have general aches and pains or after an injury or surgery. Rest  · Do not put weight on the injury for at least 24 to 48 hours. · Use crutches for a badly sprained knee or ankle. · Support a sprained wrist, elbow, or shoulder with a sling. Ice  · Put ice or a cold pack on the injury right away to reduce pain and swelling. Frozen vegetables will also work as an ice pack. Put a thin cloth between the ice or cold pack and your skin. The cloth protects the injured area from getting too cold. · Use ice for 10 to 15 minutes at a time for the first 48 to 72 hours. Compression  · Use compression for sprains, strains, and surgeries of the arms and legs. · Wrap the injured area with an elastic bandage or compression sleeve to reduce swelling. · Don't wrap it too tightly. If the area below it feels numb, tingles, or feels cool, loosen the wrap. Elevation  · Use elevation for areas of the body that can be propped up, such as arms and legs.   · Prop up the injured area on pillows whenever you use ice. Keep it propped up anytime you sit or lie down. · Try to keep the injured area at or above the level of your heart. This will help reduce swelling and bruising. Where can you learn more? Go to http://tony-iris.info/. Enter B610 in the search box to learn more about \"Learning About RICE (Rest, Ice, Compression, and Elevation). \"  Current as of: November 29, 2017  Content Version: 11.8  © 3495-9262 [employer]. Care instructions adapted under license by [employer] (which disclaims liability or warranty for this information). If you have questions about a medical condition or this instruction, always ask your healthcare professional. [address] any warranty or liability for your use of this information. Ankle Sprain: Care Instructions  Your Care Instructions    An ankle sprain can happen when you twist your ankle. The ligaments that support the ankle can get stretched and torn. Often the ankle is swollen and painful. Ankle sprains may take from several weeks to several months to heal. Usually, the more pain and swelling you have, the more severe your ankle sprain is and the longer it will take to heal. You can heal faster and regain strength in your ankle with good home treatment. It is very important to give your ankle time to heal completely, so that you do not easily hurt your ankle again. Follow-up care is a key part of your treatment and safety. Be sure to make and go to all appointments, and call your doctor if you are having problems. It's also a good idea to know your test results and keep a list of the medicines you take. How can you care for yourself at home? · Prop up your foot on pillows as much as possible for the next 3 days. Try to keep your ankle above the level of your heart. This will help reduce the swelling.   · Follow your doctor's directions for wearing a splint or elastic bandage. Wrapping the ankle may help reduce or prevent swelling. · Your doctor may give you a splint, a brace, an air stirrup, or another form of ankle support to protect your ankle until it is healed. Wear it as directed while your ankle is healing. Do not remove it unless your doctor tells you to. After your ankle has healed, ask your doctor whether you should wear the brace when you exercise. · Put ice or cold packs on your injured ankle for 10 to 20 minutes at a time. Try to do this every 1 to 2 hours for the next 3 days (when you are awake) or until the swelling goes down. Put a thin cloth between the ice and your skin. · You may need to use crutches until you can walk without pain. If you do use crutches, try to bear some weight on your injured ankle if you can do so without pain. This helps the ankle heal.  · Take pain medicines exactly as directed. ? If the doctor gave you a prescription medicine for pain, take it as prescribed. ? If you are not taking a prescription pain medicine, ask your doctor if you can take an over-the-counter medicine. · If you have been given ankle exercises to do at home, do them exactly as instructed. These can promote healing and help prevent lasting weakness. When should you call for help? Call your doctor now or seek immediate medical care if:    · Your pain is getting worse.     · Your swelling is getting worse.     · Your splint feels too tight or you are unable to loosen it.    Watch closely for changes in your health, and be sure to contact your doctor if:    · You are not getting better after 1 week. Where can you learn more? Go to http://tony-iris.info/. Enter J281 in the search box to learn more about \"Ankle Sprain: Care Instructions. \"  Current as of: November 29, 2017  Content Version: 11.8  © 1706-6149 [employer].  Care instructions adapted under license by [employer] (which disclaims liability or warranty for this information). If you have questions about a medical condition or this instruction, always ask your healthcare professional. [place] [address] any warranty or liability for your use of this information.

## 2018-11-21 NOTE — LETTER
NOTIFICATION RETURN TO WORK / SCHOOL 
 
11/21/2018 11:03 AM 
 
Mr. Reny Romano [address] [place] [id number] To Whom It May Concern: 
 
Reny Romano is currently under the care of [address] [place] [address] Nathaniel Newby. He will return to work/school on: 11-22-18 with the following restrictions: Must wear airsport brace He will follow up in our office in 2 weeks. If there are questions or concerns please have the patient contact our office. Sincerely, Gopi Solomon PA-C

## 2018-11-21 NOTE — PROCEDURES
X-rays, 3 views of the right ankle and tib/fib reveals post op changes s/p removal of syndesmotic screws. The remaining hardware is in good position. Fibula fracture healed. There appears to be slight widening noted to the medial space. Broken portion of proximal syndesmotic screw remains.

## 2018-11-21 NOTE — PROGRESS NOTES
Patient: Mag Dodd                MRN: [id number]       SSN: [id number]  YOB: 1990          AGE: 29 y.o. SEX: male    PCP:None    POST OP OFFICE NOTE  DOS: 7/27/18    Chief Complaint:   Chief Complaint   Patient presents with    Ankle Pain     right ankle        HPI:     The patient is a 29 y.o. male who presents today for follow up 4 months s/p:     REMOVAL OF SYNDESMOTIC SCREWS FROM RIGHT ANKLE    Patient states that he rolled his right ankle Friday night (11/16/18). He states that he was walking down the sidewalk in the dark and rolled his right ankle on a rock. Patient states that he had increased pain at that time which has improved after he started wearing the ASAC brace, but he has been off from work for the past 3 days. He states that he feels better and would like to return to work wearing his ASAC brace. PHYSICAL EXAM:     Visit Vitals  /80   Pulse 68   Temp 98 °F (36.7 °C) (Oral)   Resp 16   Ht 6' 3\" (1.905 m)   Wt 204 lb 6.4 oz (92.7 kg)   SpO2 96%   BMI 25.55 kg/m²         Pain Scale: /10      GEN:  Alert, well developed, well nourished, well appearing 29 y.o. male in no acute distress. PSYCH:  Normal affect, mood, and conduct. alert, oriented x 3 alert, oriented x 3, no dementia  M/S EXAMINATION OF: right ankle/foot  INCISION: Incision looks good, skin well healed. SKIN: no edema , no erythema, no  ecchymosis, no warmth    TENDERNESS:  no tenderness to palpation  NEUROVASCULAR:  grossly intact. Positive distal pulses and capillary refill. DVT ASSESSMENT:  The calf is not tender to palpation.  No evidence of DVT seen on physical exam.  ROM: WNL       RADIOGRAPHS & DIAGNOSTIC STUDIES     Results for orders placed or performed in visit on 11/21/18   AMB POC XRAY, ANKLE; COMPLETE, 3+ VIE    Narrative    Sanjuanita Edwards PA-C     11/21/2018 12:31 PM  X-rays, 3 views of the right ankle and tib/fib reveals post op   changes s/p removal of syndesmotic screws. The remaining hardware   is in good position. Fibula fracture healed. There appears to be   slight widening noted to the medial space. Broken portion of   proximal syndesmotic screw remains. AMB POC XRAY; TIBIA & FIBULA, TWO VIE    Narrative    Emy White PA-C     11/21/2018 12:31 PM  X-rays, 3 views of the right ankle and tib/fib reveals post op   changes s/p removal of syndesmotic screws. The remaining hardware   is in good position. Fibula fracture healed. There appears to be   slight widening noted to the medial space. Broken portion of   proximal syndesmotic screw remains. IMPRESSION:     Encounter Diagnoses     ICD-10-CM ICD-9-CM   1. Sprain of right ankle, unspecified ligament, initial encounter S93.401A 845.00   2. Acute right ankle pain M25.571 719.47     338.19   3. Right leg pain M79.604 729.5       PLAN:         · Take OTC pain medicine as instructed as needed. Follow RICE protocol listed below    · Continue Activity modification    · Weight bearing status:   weight bearing as tolerated in supportive shoe using ASAC brace    · Work note provided for patient to wear ASAC brace to work    · Please follow up in 2 weeks         Orders Placed This Encounter    [79150] Ankle Min 3V    [64969] Tib/Fib 2V              Patient expresses understanding of the plan. Patient education provided on post surgical care. REVIEW OF SYSTEMS:     Otherwise as noted in HPI      PAST MEDICAL HISTORY:     Past Medical History:   Diagnosis Date    Hypertension     pt denies this    Seasonal allergic rhinitis        MEDICATIONS:     Current Outpatient Medications   Medication Sig    celecoxib (CELEBREX) 200 mg capsule Take 1 Cap by mouth daily (with breakfast).  HYDROcodone-acetaminophen (NORCO) 5-325 mg per tablet Take 1-2 Tabs by mouth every four (4) hours as needed for Pain. Max Daily Amount: 12 Tabs.  polyethylene glycol (MIRALAX) 17 gram packet Take 1 Packet by mouth daily.     promethazine (PHENERGAN) 25 mg tablet Take 1 Tab by mouth every six (6) hours as needed for Nausea.  aspirin (ASPIRIN) 325 mg tablet Take 1 Tab by mouth daily. No current facility-administered medications for this visit.         ALLERGIES:     No Known Allergies      PAST SURGICAL HISTORY:     Past Surgical History:   Procedure Laterality Date    HX ANKLE FRACTURE TX Right 02/27/2018    HX ORTHOPAEDIC  02/27/2018    External Fixation right ankle    HX ORTHOPAEDIC  03/2018    hardware removeval- right ankle       SOCIAL HISTORY:     Social History     Socioeconomic History    Marital status: SINGLE     Spouse name: Not on file    Number of children: Not on file    Years of education: Not on file    Highest education level: Not on file   Social Needs    Financial resource strain: Not on file    Food insecurity - worry: Not on file    Food insecurity - inability: Not on file   [employer] needs - medical: Not on file   [employer] needs - non-medical: Not on file   Occupational History    Not on file   Tobacco Use    Smoking status: Never Smoker    Smokeless tobacco: Never Used   Substance and Sexual Activity    Alcohol use: No    Drug use: No    Sexual activity: Not on file   Other Topics Concern    Not on file   Social History Narrative    Not on file       FAMILY HISTORY:     Family History   Problem Relation Age of Onset    Hypertension Other     Hypertension Maternal Grandmother            Major Minor PA-C  11/21/2018

## 2020-12-10 ENCOUNTER — DOCUMENTATION ONLY (OUTPATIENT)
Dept: ORTHOPEDIC SURGERY | Age: 30
End: 2020-12-10

## 2020-12-10 NOTE — PROGRESS NOTES
Patient came to the [facility] office and dropped off FMLA paperwork from the [employer]. Patient is aware of the 7-10 business day form policy.  Please advise patient at [phone]

## 2020-12-14 ENCOUNTER — OFFICE VISIT (OUTPATIENT)
Dept: ORTHOPEDIC SURGERY | Age: 30
End: 2020-12-14
Payer: COMMERCIAL

## 2020-12-14 VITALS
HEIGHT: 75 IN | DIASTOLIC BLOOD PRESSURE: 87 MMHG | SYSTOLIC BLOOD PRESSURE: 140 MMHG | OXYGEN SATURATION: 97 % | TEMPERATURE: 97.3 F | BODY MASS INDEX: 26.06 KG/M2 | WEIGHT: 209.6 LBS | HEART RATE: 65 BPM | RESPIRATION RATE: 16 BRPM

## 2020-12-14 DIAGNOSIS — S82.891D CLOSED FRACTURE OF RIGHT ANKLE WITH ROUTINE HEALING, SUBSEQUENT ENCOUNTER: ICD-10-CM

## 2020-12-14 DIAGNOSIS — M25.571 CHRONIC PAIN OF RIGHT ANKLE: ICD-10-CM

## 2020-12-14 DIAGNOSIS — M19.171 POST-TRAUMATIC OSTEOARTHRITIS OF RIGHT ANKLE: Primary | ICD-10-CM

## 2020-12-14 DIAGNOSIS — G89.29 CHRONIC PAIN OF RIGHT ANKLE: ICD-10-CM

## 2020-12-14 PROCEDURE — 73610 X-RAY EXAM OF ANKLE: CPT | Performed by: ORTHOPAEDIC SURGERY

## 2020-12-14 PROCEDURE — 99213 OFFICE O/P EST LOW 20 MIN: CPT | Performed by: ORTHOPAEDIC SURGERY

## 2020-12-14 RX ORDER — FAMOTIDINE 40 MG/1
40 TABLET, FILM COATED ORAL DAILY
Qty: 30 TAB | Refills: 0 | Status: SHIPPED | OUTPATIENT
Start: 2020-12-14

## 2020-12-14 RX ORDER — MELOXICAM 15 MG/1
15 TABLET ORAL
Qty: 30 TAB | Refills: 0 | Status: SHIPPED | OUTPATIENT
Start: 2020-12-14

## 2020-12-14 NOTE — PROGRESS NOTES
AMBULATORY PROGRESS NOTE      Patient: Rashid Jules             MRN: [id number]     SSN: [id number] Body mass index is 26.2 kg/m². YOB: 1990     AGE: 27 y.o. EX: male    PCP: None       IMPRESSION //  DIAGNOSIS AND TREATMENT PLAN      DIAGNOSES  1. Post-traumatic osteoarthritis of right ankle    2. Closed fracture of right ankle with routine healing, subsequent encounter    3. Chronic pain of right ankle        Orders Placed This Encounter    AMB POC XRAY, ANKLE; COMPLETE, 3+ VIE     Order Specific Question:   Reason for Exam     Answer:   PAIN    meloxicam (Mobic) 15 mg tablet     Sig: Take 1 Tab by mouth daily (with breakfast). Dispense:  30 Tab     Refill:  0    famotidine (PEPCID) 40 mg tablet     Sig: Take 1 Tab by mouth daily. Dispense:  30 Tab     Refill:  0        Otherwise no acute fractures of these medications seen. X-ray, 3 view, right ankle, assess post medic unit changes of the right ankle, these 3 views. Bone spurs anteriorly, and the bones with anterior talus and lateral to have x-ray. There is a posterior plate, and 1 broken syndesmotic screw that is in the tibia. PLAN:    1. Mobic 15 m PO every day; 30 tablets, 0 refills. 2. Pepcid 40 m PO every day; 30 tablets, 0 refills. 3. Work Note: Please excuse Mr. Hailey Mckeon from missing any work today. RTO-1 month      HPI //  Jorden IS A 27 y.o. male who presents to my outpatient office for evaluation of: worsening post-traumatic left ankle pain. Patient has h/o of left ankle fracture in 2018. Today he continues to endorse pain and soreness In his left ankle. He describes having pain along his anterior ankle. He finds a worsening of these symptoms at his job due to prolonged walking. [facility] [employer]. He has not managed his pain with any medications. He has been wearing an ankle brace which has not provided him much relief/stability.  He finds himself limping on occasions when he walks despite the ankle brace. Patient reports that he is leaving for [place] this Friday from 12/18-12/21. Visit Vitals  BP (!) 140/87 (BP 1 Location: Right arm, BP Patient Position: Sitting)   Pulse 65   Temp 97.3 °F (36.3 °C) (Temporal)   Resp 16   Ht 6' 3\" (1.905 m)   Wt 209 lb 9.6 oz (95.1 kg)   SpO2 97%   BMI 26.20 kg/m²       ANKLE/FOOT left    Psychiatry: Alert, oriented x 3 (name,place,time of day); speech normal in context and clarity, memory intact grossly, no involuntary movements - tremors, no dementia  Gait: slightly antalgic  Tenderness: mild anterior joint line, right ankle  Cutaneous: WNL  Joint Motion: WNL  Joint / Tendon Stability: No Ankle or Subtalar instability or joint laxity. No peroneal sublux ability or dislocation  Alignment: neutral Hindfoot, none Metatarsus Adductus Metatarsus. Neuro Motor/Sensory: NL/NL,  Vascular: NL foot/ankle pulses,   Lymphatics: No extremity lymphedema, No calf swelling, no tenderness to calf muscles. CHART REVIEW     Patient Active Problem List   Diagnosis Code    Fracture of ankle, trimalleolar, closed, right, initial encounter S82.851A    Displaced Maisonneuve fracture of right lower extremity S82.861A    Blister of leg S80.829A    Ankle fracture S82.899A    Fracture tibia/fibula S82.209A, S82.409A    Maisonneuve fracture S82.863A        Shyanne Sinha has been experiencing pain and discomfort confirmed as outlined in the pain assessment outlined below. Pain Assessment  12/14/2020   Location of Pain Ankle   Location Modifiers Right   Severity of Pain 9   Quality of Pain Throbbing   Quality of Pain Comment -   Duration of Pain Persistent   Frequency of Pain Constant   Date Pain First Started -   Aggravating Factors Walking;Standing   Aggravating Factors Comment weather change   Limiting Behavior No   Relieving Factors Elevation; Heat   Result of Injury No Work-Related Injury -   Type of Injury -   Type of Injury Comment -        Willistine List  has a past medical history of Hypertension and Seasonal allergic rhinitis. Patients is employed at:         Past Medical History:   Diagnosis Date    Hypertension     pt denies this    Seasonal allergic rhinitis      Past Surgical History:   Procedure Laterality Date    HX ANKLE FRACTURE TX Right 02/27/2018    HX ORTHOPAEDIC  02/27/2018    External Fixation right ankle    HX ORTHOPAEDIC  03/2018    hardware removeval- right ankle     Current Outpatient Medications   Medication Sig    meloxicam (Mobic) 15 mg tablet Take 1 Tab by mouth daily (with breakfast).  famotidine (PEPCID) 40 mg tablet Take 1 Tab by mouth daily.  celecoxib (CELEBREX) 200 mg capsule Take 1 Cap by mouth daily (with breakfast).  polyethylene glycol (MIRALAX) 17 gram packet Take 1 Packet by mouth daily.  promethazine (PHENERGAN) 25 mg tablet Take 1 Tab by mouth every six (6) hours as needed for Nausea.  aspirin (ASPIRIN) 325 mg tablet Take 1 Tab by mouth daily.  HYDROcodone-acetaminophen (NORCO) 5-325 mg per tablet Take 1-2 Tabs by mouth every four (4) hours as needed for Pain. Max Daily Amount: 12 Tabs. No current facility-administered medications for this visit. No Known Allergies  Social History     Occupational History    Not on file   Tobacco Use    Smoking status: Never Smoker    Smokeless tobacco: Never Used   Substance and Sexual Activity    Alcohol use: No    Drug use: No    Sexual activity: Not on file     Family History   Problem Relation Age of Onset    Hypertension Other     Hypertension Maternal Grandmother        THE  FOR [facility] [place] BY Anand Liu MD 12/14/2020 .       DIAGNOSTIC IMAGING  LAB DATA      No results found for: HBA1C, HGBE8, YFC5QDOX, UUS8GLPN //   Lab Results   Component Value Date/Time    Glucose 110 (H) 07/18/2018 03:57 PM        No results found for: YVU9GQEU, EQR3YELE      No results found for: VITD3, Regenia Rudder, XQVID, VD3RIA, CTSS95HGBRZ      REVIEW OF SYSTEMS : 12/14/2020  ALL BELOW ARE Negative except : SEE HPI     CONSTITUTIONAL: No weight loss  PSYCHOLOGICAL : No Feelings of anxiety, depression, agitation  EYES: No blurred vision and no eye discharge. NO eye pain, double vision  ENT: No nasal discharge. No ear pain. CARDIOVASCULAR: No chest pain and no diaphoresis. RESPIRATORY: No cough, no hemoptysis. GI: No vomiting, no diarrhea   : No urinary frequency and no dysuria. MUSCULOSKELETAL: see HPI  SKIN: No rashes. NEURO:  No dizziness,weakness, headaches// No visual changes or confusion, or seizures,   ENDOCRINE: No polyphagia and no polydipsia. HEMATOLOGY: No bleeding tendencies. DIAGNOSTIC IMAGING        X-ray, 3 view, right ankle, assess post medic unit changes of the right ankle, these 3 views. Bone spurs anteriorly, and the bones with anterior talus and lateral to have x-ray. There is a posterior plate, and 1 broken syndesmotic screw that is in the tibia. Please see above section of this report. I have personally reviewed the results of the above study. The interpretation of this study is my professional opinion.       Kay Zabala MD  12/14/2020  1:33 PM

## 2020-12-14 NOTE — LETTER
NOTIFICATION RETURN TO WORK / SCHOOL 
 
12/14/2020 3:47 PM 
 
Mr. Lynn Menard [address] [facility] [id number] To Whom It May Concern: 
 
Lynn Menard is currently under the care of [address] [place] [address] [place]. Please excuse Mr. Veronica Jorgensen from missing any work today. If there are questions or concerns please have the patient contact our office.  
 
 
 
Sincerely, 
 
 
Patrick Tompkins MD

## 2020-12-15 ENCOUNTER — TELEPHONE (OUTPATIENT)
Dept: ORTHOPEDIC SURGERY | Age: 30
End: 2020-12-15

## 2020-12-15 NOTE — TELEPHONE ENCOUNTER
Patient called stating he needs his FMLA forms by Friday. Patient was also requesting a work note for a couple of days off of work. He stated when he was at work today, he was having to limp and his ankle is swollen. Patient can be reached at [phone].

## 2020-12-15 NOTE — LETTER
NOTIFICATION RETURN TO WORK / SCHOOL 
 
12/16/2020 1:28 PM 
 
Mr. Ever Lock [address] [facility] [id number] To Whom It May Concern: 
 
Ever Lock is currently under the care of [address] [place] [address] Nathaniel [place]by. He will be out of work starting 12/17/20 through 12/21/20. He may return to work on 12/22/20 If there are questions or concerns please have the patient contact our office.  
 
 
 
Sincerely, 
 
 
Eli Preciado MD

## 2020-12-15 NOTE — TELEPHONE ENCOUNTER
Jimbo to provide work note as requested. Roslyn Carlson [facility], IN[employer], PA-C  12/15/2020  5:01 PM

## 2020-12-16 NOTE — TELEPHONE ENCOUNTER
Called and spoke to patient. He just wants to take off work from 12/17/20 through 12/21/20. Returning to work on 12/22/20. Letter written and FMLA form filled out for just that time period.  Form and letter placed up front at [facility]

## 2020-12-16 NOTE — PROGRESS NOTES
Form has been completed and patient made aware.  Form placed up front at [facility] and copy placed in scanning

## 2021-06-11 NOTE — PATIENT INSTRUCTIONS
Problem: Non-Violent Restraints  Goal: Removal from restraints as soon as assessed to be safe  Outcome: Completed  Goal: No harm/injury to patient while restraints in use  Outcome: Completed  Goal: Patient's dignity will be maintained  Outcome: Completed Sutures removed in the office today. Short Leg cast applied. Please keep your right lower extremity dry  Continue non-weight bearing  Continue to take aspirin as directed  Take a calcium + vitamin D supplement  Follow up in two weeks or sooner as needed         Broken Ankle: Care Instructions  Your Care Instructions    An ankle may break (fracture) during sports, a fall, or other accidents. Fractures can range from a small, hairline crack, to a bone or bones broken into two or more pieces. Your treatment depends on how bad the break is. Your doctor may have put your ankle in a splint or cast to allow it to heal or to keep it stable until you see another doctor. It may take weeks or months for your ankle to heal. You can help your ankle heal with some care at home. You heal best when you take good care of yourself. Eat a variety of healthy foods, and don't smoke. You may have had a sedative to help you relax. You may be unsteady after having sedation. It can take a few hours for the medicine's effects to wear off. Common side effects of sedation include nausea, vomiting, and feeling sleepy or tired. The doctor has checked you carefully, but problems can develop later. If you notice any problems or new symptoms,  get medical treatment right away. Follow-up care is a key part of your treatment and safety. Be sure to make and go to all appointments, and call your doctor if you are having problems. It's also a good idea to know your test results and keep a list of the medicines you take. How can you care for yourself at home? · If the doctor gave you a sedative:  ¨ For 24 hours, don't do anything that requires attention to detail. It takes time for the medicine's effects to completely wear off. ¨ For your safety, do not drive or operate any machinery that could be dangerous. Wait until the medicine wears off and you can think clearly and react easily.   · Put ice or a cold pack on your ankle for 10 to 20 minutes at a time. Try to do this every 1 to 2 hours for the next 3 days (when you are awake). Put a thin cloth between the ice and your cast or splint. Keep your cast or splint dry. · Follow the cast care instructions your doctor gives you. If you have a splint, do not take it off unless your doctor tells you to. · Be safe with medicines. Take pain medicines exactly as directed. ¨ If the doctor gave you a prescription medicine for pain, take it as prescribed. ¨ If you are not taking a prescription pain medicine, ask your doctor if you can take an over-the-counter medicine. · Prop up your leg on pillows in the first few days after the injury. Keep the ankle higher than the level of your heart. This will help reduce swelling. · Do not put weight on your ankle unless your doctor tells you to. Use crutches to walk. · Follow instructions for exercises to keep your leg strong. · Wiggle your toes often to reduce swelling and stiffness. When should you call for help? Call 911 anytime you think you may need emergency care. For example, call if:  ? · You have chest pain, are short of breath, or you cough up blood. ? · You are very sleepy and you have trouble waking up. ?Call your doctor now or seek immediate medical care if:  ? · You have new or worse nausea or vomiting. ? · You have new or worse pain. ? · Your foot is cool or pale or changes color. ? · You have tingling, weakness, or numbness in your toes. ? · Your cast or splint feels too tight. ? · You have signs of a blood clot in your leg (called a deep vein thrombosis), such as:  ¨ Pain in your calf, back of the knee, thigh, or groin. ¨ Redness or swelling in your leg. ? Watch closely for changes in your health, and be sure to contact your doctor if:  ? · You have a problem with your splint or cast.   ? · You do not get better as expected. Where can you learn more? Go to http://tony-iris.info/.   Enter P763 in the search box to learn more about \"Broken Ankle: Care Instructions. \"  Current as of: March 21, 2017  Content Version: 11.4  © 7186-8479 [employer]wise, [place]. Care instructions adapted under license by [employer] (which disclaims liability or warranty for this information). If you have questions about a medical condition or this instruction, always ask your healthcare professional. [place] [address] any warranty or liability for your use of this information.

## (undated) DEVICE — PACK SURG BSHR TOT KNEE LF

## (undated) DEVICE — THREE-QUARTER SHEET: Brand: CONVERTORS

## (undated) DEVICE — 3M™ BAIR PAWS FLEX™ WARMING GOWN, STANDARD, 20 PER CASE 81003: Brand: BAIR PAWS™

## (undated) DEVICE — AIRLIFE™ NASAL OXYGEN CANNULA CURVED, NONFLARED TIP WITH 14 FOOT (4.3 M) CRUSH-RESISTANT TUBING, OVER-THE-EAR STYLE: Brand: AIRLIFE™

## (undated) DEVICE — INTENDED FOR TISSUE SEPARATION, AND OTHER PROCEDURES THAT REQUIRE A SHARP SURGICAL BLADE TO PUNCTURE OR CUT.: Brand: BARD-PARKER SAFETY BLADES SIZE 10, STERILE

## (undated) DEVICE — SUTURE MCRYL SZ 4-0 L18IN ABSRB UD L19MM PS-2 3/8 CIR PRIM Y496G

## (undated) DEVICE — BANDAGE COMPR EXSANGUATION SGL LAYERED NO CLSR 9FT LEN 4IN W

## (undated) DEVICE — DRESSING,GAUZE,XEROFORM,CURAD,5"X9",ST: Brand: CURAD

## (undated) DEVICE — FLEX ADVANTAGE 3000CC: Brand: FLEX ADVANTAGE

## (undated) DEVICE — SOLUTION SCRB 4OZ 4% CHG CLN BASE FOR PT SKIN ANTISEPSIS

## (undated) DEVICE — SHEET,DRAPE,70X100,STERILE: Brand: MEDLINE

## (undated) DEVICE — BANDAGE COMPR W4INXL5YD BGE COHESIVE SELF ADH ADBAN CBN1104] AVCOR HEALTHCARE PRODUCTS INC]

## (undated) DEVICE — CUFF TRNQT AD W4IN 34IN CIRC SURG GEL SGL PRT BLDR PNEUMAT

## (undated) DEVICE — REM POLYHESIVE ADULT PATIENT RETURN ELECTRODE: Brand: VALLEYLAB

## (undated) DEVICE — KIT CLN UP BON SECOURS MARYV

## (undated) DEVICE — SYR 10ML LUER LOK 1/5ML GRAD --

## (undated) DEVICE — DRAPE XR C ARM 41X74IN LF --

## (undated) DEVICE — ABDOMINAL PAD: Brand: DERMACEA

## (undated) DEVICE — TRNQT RMFG CUFF 2PRT 34IN W/O -- LAWSON OEM ITEM 338154

## (undated) DEVICE — SUTURE VCRL SZ 3-0 L18IN ABSRB VLT L26MM SH 1/2 CIR J774D

## (undated) DEVICE — SOLUTION IRRIG 3000ML 0.9% SOD CHL FLX CONT 0797208] ICU MEDICAL INC]

## (undated) DEVICE — POST EXT FIX DIA11MM STR OUTRIG MAG RESONANCE CONDITIONAL

## (undated) DEVICE — NEEDLE HYPO 25GA L1.5IN BVL ORIENTED ECLIPSE

## (undated) DEVICE — DRAPE,HAND,STERILE: Brand: MEDLINE

## (undated) DEVICE — BLADE ASSEMB CLP HAIR FINE --

## (undated) DEVICE — INTENDED FOR TISSUE SEPARATION, AND OTHER PROCEDURES THAT REQUIRE A SHARP SURGICAL BLADE TO PUNCTURE OR CUT.: Brand: BARD-PARKER SAFETY BLADES SIZE 15, STERILE

## (undated) DEVICE — (D)GLOVE SURG ORTH 8 PWD LTX -- DISC BY MFR USE ITEM 278015

## (undated) DEVICE — PADDING CAST SOF-ROL 4INX4YD -- NS

## (undated) DEVICE — WRAP COMPR W4INXL5YD NONSTERILE TAN SELF ADH COBAN

## (undated) DEVICE — Z DISCONTINUED BY MEDLINE USE 2711682 TRAY SKIN PREP DRY W/ PREM GLV

## (undated) DEVICE — SOLUTION IV 1000ML 0.9% SOD CHL

## (undated) DEVICE — (D)PREP SKN CHLRAPRP APPL 26ML -- CONVERT TO ITEM 371833

## (undated) DEVICE — LIGHT HANDLE: Brand: DEVON

## (undated) DEVICE — BNDG COMPR ELAS KNIT 6INX5YD --

## (undated) DEVICE — SUTURE ABSORBABLE BRAIDED 2-0 CT-1 27 IN UD VICRYL J259H

## (undated) DEVICE — 2.5MM DRILL BIT/QC/GOLD/110MM

## (undated) DEVICE — DERMACEA GAUZE ROLL: Brand: DERMACEA

## (undated) DEVICE — KENDALL SCD EXPRESS SLEEVES, KNEE LENGTH, MEDIUM: Brand: KENDALL SCD

## (undated) DEVICE — SLIM BODY SKIN STAPLER: Brand: APPOSE ULC

## (undated) DEVICE — STERILE POLYISOPRENE POWDER-FREE SURGICAL GLOVES: Brand: PROTEXIS

## (undated) DEVICE — OCCLUSIVE GAUZE STRIP,3% BISMUTH TRIBROMOPHENATE IN PETROLATUM BLEND: Brand: XEROFORM

## (undated) DEVICE — PADDING CAST SOF-ROL 6INX4YD --

## (undated) DEVICE — GAUZE SPONGES,16 PLY: Brand: CURITY

## (undated) DEVICE — PADDING CAST W4INXL4YD ST COT COHESIVE HND TEARABLE SPEC

## (undated) DEVICE — DRESSING,GAUZE,XEROFORM,CURAD,1"X8",ST: Brand: CURAD

## (undated) DEVICE — SPLINT CAST PLASTER 4X15FT -- DYNACAST

## (undated) DEVICE — SUTURE ETHLN SZ 3-0 L30IN NONABSORBABLE BLK L30MM PSLX 3/8 1691H

## (undated) DEVICE — C-ARMOR C-ARM EQUIPMENT COVERS CLEAR STERILE UNIVERSAL FIT 12 PER CASE: Brand: C-ARMOR

## (undated) DEVICE — DERMACEA GAUZE FLUFF ROLL: Brand: DERMACEA

## (undated) DEVICE — K WIRE FIX L150MM DIA1.25MM S STL TRCR PNT
Type: IMPLANTABLE DEVICE | Site: ANKLE | Status: NON-FUNCTIONAL
Removed: 2018-03-14

## (undated) DEVICE — 2.0MM DRILL BIT WITH DEPTH MARK/QC/140MM

## (undated) DEVICE — SOFT SILICONE HYDROCELLULAR SACRUM DRESSING WITH LOCK AWAY LAYER: Brand: ALLEVYN LIFE SACRUM (LARGE) PACK OF 10

## (undated) DEVICE — (D)SYR 10ML 1/5ML GRAD NSAF -- PKGING CHANGE USE ITEM 338027

## (undated) DEVICE — DRAPE,REIN 53X77,STERILE: Brand: MEDLINE

## (undated) DEVICE — SUTURE VCRL SZ 2-0 L27IN ABSRB UD L26MM SH 1/2 CIR J417H

## (undated) DEVICE — Device